# Patient Record
Sex: MALE | Race: WHITE | Employment: OTHER | URBAN - NONMETROPOLITAN AREA
[De-identification: names, ages, dates, MRNs, and addresses within clinical notes are randomized per-mention and may not be internally consistent; named-entity substitution may affect disease eponyms.]

---

## 2023-05-16 ENCOUNTER — HOSPITAL ENCOUNTER (INPATIENT)
Age: 83
LOS: 3 days | Discharge: HOME | DRG: 286 | End: 2023-05-19
Attending: INTERNAL MEDICINE
Payer: MEDICARE

## 2023-05-16 ENCOUNTER — APPOINTMENT (OUTPATIENT)
Dept: GENERAL RADIOLOGY | Age: 83
DRG: 286 | End: 2023-05-16
Payer: MEDICARE

## 2023-05-16 DIAGNOSIS — I48.91 ATRIAL FIBRILLATION WITH RVR (HCC): ICD-10-CM

## 2023-05-16 DIAGNOSIS — R60.0 BILATERAL LEG EDEMA: ICD-10-CM

## 2023-05-16 DIAGNOSIS — I50.9 ACUTE CONGESTIVE HEART FAILURE, UNSPECIFIED HEART FAILURE TYPE (HCC): ICD-10-CM

## 2023-05-16 DIAGNOSIS — I48.91 NEW ONSET ATRIAL FIBRILLATION (HCC): Primary | ICD-10-CM

## 2023-05-16 PROBLEM — I50.21 ACUTE CLINICAL SYSTOLIC HEART FAILURE (HCC): Status: ACTIVE | Noted: 2023-05-16

## 2023-05-16 LAB
25(OH)D3 SERPL-MCNC: 26 NG/ML (ref 30–100)
ABO: NORMAL
ALBUMIN SERPL BCG-MCNC: 3.6 G/DL (ref 3.5–5.1)
ALP SERPL-CCNC: 169 U/L (ref 38–126)
ALT SERPL W/O P-5'-P-CCNC: 19 U/L (ref 11–66)
ANION GAP SERPL CALC-SCNC: 10 MEQ/L (ref 8–16)
ANTIBODY SCREEN: NORMAL
APTT PPP: 35 SECONDS (ref 22–38)
AST SERPL-CCNC: 20 U/L (ref 5–40)
BASOPHILS ABSOLUTE: 0 THOU/MM3 (ref 0–0.1)
BASOPHILS NFR BLD AUTO: 0.9 %
BILIRUB SERPL-MCNC: 0.7 MG/DL (ref 0.3–1.2)
BUN SERPL-MCNC: 24 MG/DL (ref 7–22)
CALCIUM SERPL-MCNC: 9.1 MG/DL (ref 8.5–10.5)
CHLORIDE SERPL-SCNC: 104 MEQ/L (ref 98–111)
CO2 SERPL-SCNC: 24 MEQ/L (ref 23–33)
CREAT SERPL-MCNC: 1.1 MG/DL (ref 0.4–1.2)
DEPRECATED RDW RBC AUTO: 54.5 FL (ref 35–45)
DEPRECATED RDW RBC AUTO: 56 FL (ref 35–45)
EOSINOPHIL NFR BLD AUTO: 1.5 %
EOSINOPHILS ABSOLUTE: 0.1 THOU/MM3 (ref 0–0.4)
ERYTHROCYTE [DISTWIDTH] IN BLOOD BY AUTOMATED COUNT: 15.1 % (ref 11.5–14.5)
ERYTHROCYTE [DISTWIDTH] IN BLOOD BY AUTOMATED COUNT: 15.1 % (ref 11.5–14.5)
GFR SERPL CREATININE-BSD FRML MDRD: > 60 ML/MIN/1.73M2
GLUCOSE SERPL-MCNC: 106 MG/DL (ref 70–108)
HCT VFR BLD AUTO: 36.7 % (ref 42–52)
HCT VFR BLD AUTO: 38.2 % (ref 42–52)
HEPARIN UNFRACTIONATED: < 0.04 U/ML (ref 0.3–0.7)
HGB BLD-MCNC: 11.1 GM/DL (ref 14–18)
HGB BLD-MCNC: 11.6 GM/DL (ref 14–18)
HGB RETIC QN AUTO: 32.1 PG (ref 28.2–35.7)
IMM GRANULOCYTES # BLD AUTO: 0.01 THOU/MM3 (ref 0–0.07)
IMM GRANULOCYTES NFR BLD AUTO: 0.2 %
IMM RETICS NFR: 24.1 % (ref 2.3–13.4)
INR PPP: 1.14 (ref 0.85–1.13)
LYMPHOCYTES ABSOLUTE: 0.9 THOU/MM3 (ref 1–4.8)
LYMPHOCYTES NFR BLD AUTO: 18.6 %
MCH RBC QN AUTO: 29.7 PG (ref 26–33)
MCH RBC QN AUTO: 30.4 PG (ref 26–33)
MCHC RBC AUTO-ENTMCNC: 30.2 GM/DL (ref 32.2–35.5)
MCHC RBC AUTO-ENTMCNC: 30.4 GM/DL (ref 32.2–35.5)
MCV RBC AUTO: 100.3 FL (ref 80–94)
MCV RBC AUTO: 98.1 FL (ref 80–94)
MONOCYTES ABSOLUTE: 0.5 THOU/MM3 (ref 0.4–1.3)
MONOCYTES NFR BLD AUTO: 10.7 %
NEUTROPHILS NFR BLD AUTO: 68.1 %
NRBC BLD AUTO-RTO: 0 /100 WBC
NT-PROBNP SERPL IA-MCNC: 2843 PG/ML (ref 0–449)
OSMOLALITY SERPL CALC.SUM OF ELEC: 280.1 MOSMOL/KG (ref 275–300)
PLATELET # BLD AUTO: 197 THOU/MM3 (ref 130–400)
PLATELET # BLD AUTO: 205 THOU/MM3 (ref 130–400)
PMV BLD AUTO: 10 FL (ref 9.4–12.4)
PMV BLD AUTO: 9.9 FL (ref 9.4–12.4)
POTASSIUM SERPL-SCNC: 3.9 MEQ/L (ref 3.5–5.2)
PROT SERPL-MCNC: 6.6 G/DL (ref 6.1–8)
RBC # BLD AUTO: 3.74 MILL/MM3 (ref 4.7–6.1)
RBC # BLD AUTO: 3.81 MILL/MM3 (ref 4.7–6.1)
RETICS # AUTO: 63 THOU/MM3 (ref 20–115)
RETICS/RBC NFR AUTO: 1.7 % (ref 0.5–2)
RH FACTOR: NORMAL
SEGMENTED NEUTROPHILS ABSOLUTE COUNT: 3.2 THOU/MM3 (ref 1.8–7.7)
SODIUM SERPL-SCNC: 138 MEQ/L (ref 135–145)
TROPONIN T: < 0.01 NG/ML
TSH SERPL DL<=0.005 MIU/L-ACNC: 4.51 UIU/ML (ref 0.4–4.2)
WBC # BLD AUTO: 4.7 THOU/MM3 (ref 4.8–10.8)
WBC # BLD AUTO: 4.8 THOU/MM3 (ref 4.8–10.8)

## 2023-05-16 PROCEDURE — 86850 RBC ANTIBODY SCREEN: CPT

## 2023-05-16 PROCEDURE — 85027 COMPLETE CBC AUTOMATED: CPT

## 2023-05-16 PROCEDURE — 82728 ASSAY OF FERRITIN: CPT

## 2023-05-16 PROCEDURE — 85520 HEPARIN ASSAY: CPT

## 2023-05-16 PROCEDURE — 84443 ASSAY THYROID STIM HORMONE: CPT

## 2023-05-16 PROCEDURE — 99223 1ST HOSP IP/OBS HIGH 75: CPT | Performed by: INTERNAL MEDICINE

## 2023-05-16 PROCEDURE — 2580000003 HC RX 258: Performed by: PHYSICIAN ASSISTANT

## 2023-05-16 PROCEDURE — 93005 ELECTROCARDIOGRAM TRACING: CPT | Performed by: PHYSICIAN ASSISTANT

## 2023-05-16 PROCEDURE — 85046 RETICYTE/HGB CONCENTRATE: CPT

## 2023-05-16 PROCEDURE — 83540 ASSAY OF IRON: CPT

## 2023-05-16 PROCEDURE — 6370000000 HC RX 637 (ALT 250 FOR IP): Performed by: STUDENT IN AN ORGANIZED HEALTH CARE EDUCATION/TRAINING PROGRAM

## 2023-05-16 PROCEDURE — 36415 COLL VENOUS BLD VENIPUNCTURE: CPT

## 2023-05-16 PROCEDURE — 84484 ASSAY OF TROPONIN QUANT: CPT

## 2023-05-16 PROCEDURE — 85025 COMPLETE CBC W/AUTO DIFF WBC: CPT

## 2023-05-16 PROCEDURE — 6360000002 HC RX W HCPCS: Performed by: STUDENT IN AN ORGANIZED HEALTH CARE EDUCATION/TRAINING PROGRAM

## 2023-05-16 PROCEDURE — 2500000003 HC RX 250 WO HCPCS: Performed by: PHYSICIAN ASSISTANT

## 2023-05-16 PROCEDURE — 82607 VITAMIN B-12: CPT

## 2023-05-16 PROCEDURE — 86901 BLOOD TYPING SEROLOGIC RH(D): CPT

## 2023-05-16 PROCEDURE — 85610 PROTHROMBIN TIME: CPT

## 2023-05-16 PROCEDURE — 2140000000 HC CCU INTERMEDIATE R&B

## 2023-05-16 PROCEDURE — 86900 BLOOD TYPING SEROLOGIC ABO: CPT

## 2023-05-16 PROCEDURE — 99285 EMERGENCY DEPT VISIT HI MDM: CPT

## 2023-05-16 PROCEDURE — 82306 VITAMIN D 25 HYDROXY: CPT

## 2023-05-16 PROCEDURE — 83880 ASSAY OF NATRIURETIC PEPTIDE: CPT

## 2023-05-16 PROCEDURE — 85730 THROMBOPLASTIN TIME PARTIAL: CPT

## 2023-05-16 PROCEDURE — 83550 IRON BINDING TEST: CPT

## 2023-05-16 PROCEDURE — 71045 X-RAY EXAM CHEST 1 VIEW: CPT

## 2023-05-16 PROCEDURE — 82746 ASSAY OF FOLIC ACID SERUM: CPT

## 2023-05-16 PROCEDURE — 80053 COMPREHEN METABOLIC PANEL: CPT

## 2023-05-16 RX ORDER — ONDANSETRON 2 MG/ML
4 INJECTION INTRAMUSCULAR; INTRAVENOUS EVERY 6 HOURS PRN
Status: DISCONTINUED | OUTPATIENT
Start: 2023-05-16 | End: 2023-05-19 | Stop reason: HOSPADM

## 2023-05-16 RX ORDER — SODIUM CHLORIDE 9 MG/ML
INJECTION, SOLUTION INTRAVENOUS PRN
Status: DISCONTINUED | OUTPATIENT
Start: 2023-05-16 | End: 2023-05-19 | Stop reason: SDUPTHER

## 2023-05-16 RX ORDER — POLYETHYLENE GLYCOL 3350 17 G/17G
17 POWDER, FOR SOLUTION ORAL DAILY PRN
Status: DISCONTINUED | OUTPATIENT
Start: 2023-05-16 | End: 2023-05-19 | Stop reason: HOSPADM

## 2023-05-16 RX ORDER — POTASSIUM CHLORIDE 20 MEQ/1
40 TABLET, EXTENDED RELEASE ORAL PRN
Status: DISCONTINUED | OUTPATIENT
Start: 2023-05-16 | End: 2023-05-17

## 2023-05-16 RX ORDER — ACETAMINOPHEN 650 MG/1
650 SUPPOSITORY RECTAL EVERY 6 HOURS PRN
Status: DISCONTINUED | OUTPATIENT
Start: 2023-05-16 | End: 2023-05-19 | Stop reason: HOSPADM

## 2023-05-16 RX ORDER — SODIUM CHLORIDE 0.9 % (FLUSH) 0.9 %
5-40 SYRINGE (ML) INJECTION EVERY 12 HOURS SCHEDULED
Status: DISCONTINUED | OUTPATIENT
Start: 2023-05-16 | End: 2023-05-19 | Stop reason: HOSPADM

## 2023-05-16 RX ORDER — DIGOXIN 0.25 MG/ML
500 INJECTION INTRAMUSCULAR; INTRAVENOUS ONCE
Status: COMPLETED | OUTPATIENT
Start: 2023-05-16 | End: 2023-05-16

## 2023-05-16 RX ORDER — ENOXAPARIN SODIUM 100 MG/ML
40 INJECTION SUBCUTANEOUS DAILY
Status: DISCONTINUED | OUTPATIENT
Start: 2023-05-16 | End: 2023-05-16

## 2023-05-16 RX ORDER — FUROSEMIDE 10 MG/ML
20 INJECTION INTRAMUSCULAR; INTRAVENOUS 2 TIMES DAILY
Status: DISCONTINUED | OUTPATIENT
Start: 2023-05-16 | End: 2023-05-18

## 2023-05-16 RX ORDER — HEPARIN SODIUM 1000 [USP'U]/ML
2000 INJECTION, SOLUTION INTRAVENOUS; SUBCUTANEOUS PRN
Status: DISCONTINUED | OUTPATIENT
Start: 2023-05-16 | End: 2023-05-19 | Stop reason: HOSPADM

## 2023-05-16 RX ORDER — ROSUVASTATIN CALCIUM 20 MG/1
40 TABLET, COATED ORAL NIGHTLY
Status: DISCONTINUED | OUTPATIENT
Start: 2023-05-16 | End: 2023-05-19 | Stop reason: HOSPADM

## 2023-05-16 RX ORDER — DILTIAZEM HYDROCHLORIDE 5 MG/ML
10 INJECTION INTRAVENOUS ONCE
Status: COMPLETED | OUTPATIENT
Start: 2023-05-16 | End: 2023-05-16

## 2023-05-16 RX ORDER — HEPARIN SODIUM 1000 [USP'U]/ML
4000 INJECTION, SOLUTION INTRAVENOUS; SUBCUTANEOUS ONCE
Status: COMPLETED | OUTPATIENT
Start: 2023-05-16 | End: 2023-05-16

## 2023-05-16 RX ORDER — HEPARIN SODIUM 1000 [USP'U]/ML
4000 INJECTION, SOLUTION INTRAVENOUS; SUBCUTANEOUS PRN
Status: DISCONTINUED | OUTPATIENT
Start: 2023-05-16 | End: 2023-05-19 | Stop reason: HOSPADM

## 2023-05-16 RX ORDER — ATORVASTATIN CALCIUM 40 MG/1
40 TABLET, FILM COATED ORAL DAILY
COMMUNITY

## 2023-05-16 RX ORDER — MAGNESIUM HYDROXIDE/ALUMINUM HYDROXICE/SIMETHICONE 120; 1200; 1200 MG/30ML; MG/30ML; MG/30ML
30 SUSPENSION ORAL EVERY 6 HOURS PRN
Status: DISCONTINUED | OUTPATIENT
Start: 2023-05-16 | End: 2023-05-19 | Stop reason: HOSPADM

## 2023-05-16 RX ORDER — ACETAMINOPHEN 325 MG/1
650 TABLET ORAL EVERY 8 HOURS PRN
COMMUNITY

## 2023-05-16 RX ORDER — POTASSIUM CHLORIDE 7.45 MG/ML
10 INJECTION INTRAVENOUS PRN
Status: DISCONTINUED | OUTPATIENT
Start: 2023-05-16 | End: 2023-05-17

## 2023-05-16 RX ORDER — ONDANSETRON 4 MG/1
4 TABLET, ORALLY DISINTEGRATING ORAL EVERY 8 HOURS PRN
Status: DISCONTINUED | OUTPATIENT
Start: 2023-05-16 | End: 2023-05-19 | Stop reason: HOSPADM

## 2023-05-16 RX ORDER — DIGOXIN 0.25 MG/ML
250 INJECTION INTRAMUSCULAR; INTRAVENOUS EVERY 6 HOURS
Status: COMPLETED | OUTPATIENT
Start: 2023-05-17 | End: 2023-05-17

## 2023-05-16 RX ORDER — ACETAMINOPHEN 325 MG/1
650 TABLET ORAL EVERY 6 HOURS PRN
Status: DISCONTINUED | OUTPATIENT
Start: 2023-05-16 | End: 2023-05-19 | Stop reason: HOSPADM

## 2023-05-16 RX ORDER — HEPARIN SODIUM 10000 [USP'U]/100ML
5-30 INJECTION, SOLUTION INTRAVENOUS CONTINUOUS
Status: DISCONTINUED | OUTPATIENT
Start: 2023-05-16 | End: 2023-05-19 | Stop reason: HOSPADM

## 2023-05-16 RX ORDER — SPIRONOLACTONE 25 MG/1
25 TABLET ORAL DAILY
Status: DISCONTINUED | OUTPATIENT
Start: 2023-05-16 | End: 2023-05-19 | Stop reason: HOSPADM

## 2023-05-16 RX ORDER — MAGNESIUM SULFATE IN WATER 40 MG/ML
2000 INJECTION, SOLUTION INTRAVENOUS PRN
Status: DISCONTINUED | OUTPATIENT
Start: 2023-05-16 | End: 2023-05-19 | Stop reason: SDUPTHER

## 2023-05-16 RX ORDER — TAMSULOSIN HYDROCHLORIDE 0.4 MG/1
0.4 CAPSULE ORAL DAILY
COMMUNITY

## 2023-05-16 RX ORDER — SENNOSIDES 8.8 MG/5ML
5 LIQUID ORAL 2 TIMES DAILY PRN
Status: DISCONTINUED | OUTPATIENT
Start: 2023-05-16 | End: 2023-05-19 | Stop reason: HOSPADM

## 2023-05-16 RX ORDER — SODIUM CHLORIDE 0.9 % (FLUSH) 0.9 %
5-40 SYRINGE (ML) INJECTION PRN
Status: DISCONTINUED | OUTPATIENT
Start: 2023-05-16 | End: 2023-05-19 | Stop reason: HOSPADM

## 2023-05-16 RX ADMIN — HEPARIN SODIUM 4000 UNITS: 1000 INJECTION INTRAVENOUS; SUBCUTANEOUS at 20:17

## 2023-05-16 RX ADMIN — HEPARIN SODIUM 12 UNITS/KG/HR: 10000 INJECTION, SOLUTION INTRAVENOUS at 20:21

## 2023-05-16 RX ADMIN — ROSUVASTATIN CALCIUM 40 MG: 20 TABLET, FILM COATED ORAL at 23:35

## 2023-05-16 RX ADMIN — FUROSEMIDE 20 MG: 10 INJECTION, SOLUTION INTRAMUSCULAR; INTRAVENOUS at 23:29

## 2023-05-16 RX ADMIN — SPIRONOLACTONE 25 MG: 25 TABLET ORAL at 23:35

## 2023-05-16 RX ADMIN — DILTIAZEM HYDROCHLORIDE 10 MG: 5 INJECTION INTRAVENOUS at 18:07

## 2023-05-16 RX ADMIN — DIGOXIN 500 MCG: 0.25 INJECTION INTRAMUSCULAR; INTRAVENOUS at 21:46

## 2023-05-16 RX ADMIN — DILTIAZEM HYDROCHLORIDE 5 MG/HR: 5 INJECTION INTRAVENOUS at 18:08

## 2023-05-16 ASSESSMENT — ENCOUNTER SYMPTOMS
PHOTOPHOBIA: 0
CHEST TIGHTNESS: 0
SHORTNESS OF BREATH: 1
WHEEZING: 0
ABDOMINAL DISTENTION: 0
VOMITING: 0
ABDOMINAL PAIN: 0
COUGH: 1

## 2023-05-16 NOTE — ED TRIAGE NOTES
Pt to ED due to bilateral leg swelling. Pt states this has been gradually getting worse over the past month. Pt states that the swelling is better when lays down at night.

## 2023-05-16 NOTE — ED NOTES
ED to inpatient nurses report    Chief Complaint   Patient presents with    Leg Swelling      Present to ED from home  LOC: alert and orientated to name, place, date  Vital signs   Vitals:    05/16/23 1613 05/16/23 1708 05/16/23 1838 05/16/23 1858   BP:  (!) 116/91 (!) 125/96 (!) 125/96   Pulse: (!) 158 (!) 152 91 (!) 112   Resp:  17 14 18   Temp:       SpO2:  98% 95% 98%   Weight:          Oxygen Baseline 98%    Current needs required RA Bipap/Cpap No  LDAs:   Peripheral IV 05/16/23 Left Antecubital (Active)     Mobility: Independent  Pending ED orders: NA  Present condition: stable      C-SSRS Risk of Suicide: No Risk  Swallow Screening    Preferred Language: [place]     Electronically signed by Suraj Brown RN on 5/16/2023 at 7:10 PM       Suraj Brown RN  05/16/23 1911

## 2023-05-16 NOTE — ED NOTES
Pt resting on cot. No distress noted. RR even and unlabored. Call light in reach. Pt denies any further needs at this time.       Bryce Barker RN  05/16/23 8102

## 2023-05-16 NOTE — ED PROVIDER NOTES
onset atrial fibrillation ([address]    2. Atrial fibrillation with RVR ([address]    3. Acute congestive heart failure, unspecified heart failure type ([address]    4.  Bilateral leg edema      Admission      (Please note that portions of this note were completed with a voice recognition program.  Efforts were made to edit the dictations but occasionally words are mis-transcribed.)    Raheel Nunez PA-C 05/16/23 6:50 PM    SEVTLANA Landin PA-C  05/20/23 0202

## 2023-05-16 NOTE — ED NOTES
ED nurse-to-nurse bedside report    Chief Complaint   Patient presents with    Leg Swelling      LOC: alert and orientated to name, place, date  Vital signs   Vitals:    05/16/23 1613 05/16/23 1708 05/16/23 1838 05/16/23 1858   BP:  (!) 116/91 (!) 125/96 (!) 125/96   Pulse: (!) 158 (!) 152 91 (!) 112   Resp:  17 14 18   Temp:       SpO2:  98% 95% 98%   Weight:          Pain:    Pain Interventions: NA  Pain Goal: 0  Oxygen: No    Current needs required R   Telemetry: Yes  LDAs:   Peripheral IV 05/16/23 Left Antecubital (Active)     Continuous Infusions:    dilTIAZem 5 mg/hr (05/16/23 1808)    sodium chloride      heparin (PORCINE) Infusion       Mobility: Independent  Moss Fall Risk Score: No flowsheet data found.   Fall Interventions: call light in reach, bed in low position with wheels locked  Report given to: Drew Antonio RN  05/16/23 3877

## 2023-05-16 NOTE — ED NOTES
Pt resting on cot. No distress noted. RR even and unlabored. Call light in reach. Pt denies any needs at this time.       Nathan Newell RN  05/16/23 6376

## 2023-05-17 PROBLEM — I48.91 NEW ONSET ATRIAL FIBRILLATION (HCC): Status: ACTIVE | Noted: 2023-05-17

## 2023-05-17 LAB
ANION GAP SERPL CALC-SCNC: 14 MEQ/L (ref 8–16)
APTT PPP: 82.3 SECONDS (ref 22–38)
BASOPHILS ABSOLUTE: 0 THOU/MM3 (ref 0–0.1)
BASOPHILS NFR BLD AUTO: 0.8 %
BUN SERPL-MCNC: 19 MG/DL (ref 7–22)
CALCIUM SERPL-MCNC: 9.1 MG/DL (ref 8.5–10.5)
CHLORIDE SERPL-SCNC: 100 MEQ/L (ref 98–111)
CHOLEST SERPL-MCNC: 109 MG/DL (ref 100–199)
CO2 SERPL-SCNC: 24 MEQ/L (ref 23–33)
CREAT SERPL-MCNC: 1.1 MG/DL (ref 0.4–1.2)
DEPRECATED RDW RBC AUTO: 54.2 FL (ref 35–45)
EOSINOPHIL NFR BLD AUTO: 2.3 %
EOSINOPHILS ABSOLUTE: 0.1 THOU/MM3 (ref 0–0.4)
ERYTHROCYTE [DISTWIDTH] IN BLOOD BY AUTOMATED COUNT: 15 % (ref 11.5–14.5)
FERRITIN SERPL IA-MCNC: 32 NG/ML (ref 22–322)
FOLATE SERPL-MCNC: 15.7 NG/ML (ref 4.8–24.2)
GFR SERPL CREATININE-BSD FRML MDRD: > 60 ML/MIN/1.73M2
GLUCOSE SERPL-MCNC: 106 MG/DL (ref 70–108)
HCT VFR BLD AUTO: 38.2 % (ref 42–52)
HDLC SERPL-MCNC: 54 MG/DL
HEPARIN UNFRACTIONATED: 0.34 U/ML (ref 0.3–0.7)
HEPARIN UNFRACTIONATED: 0.39 U/ML (ref 0.3–0.7)
HEPARIN UNFRACTIONATED: 0.44 U/ML (ref 0.3–0.7)
HGB BLD-MCNC: 11.5 GM/DL (ref 14–18)
IMM GRANULOCYTES # BLD AUTO: 0.01 THOU/MM3 (ref 0–0.07)
IMM GRANULOCYTES NFR BLD AUTO: 0.2 %
INR PPP: 1.18 (ref 0.85–1.13)
IRON SATN MFR SERPL: 13 % (ref 20–50)
IRON SERPL-MCNC: 42 UG/DL (ref 65–195)
LDLC SERPL CALC-MCNC: 46 MG/DL
LV EF: 50 %
LVEF MODALITY: NORMAL
LYMPHOCYTES ABSOLUTE: 1.3 THOU/MM3 (ref 1–4.8)
LYMPHOCYTES NFR BLD AUTO: 24.3 %
MAGNESIUM SERPL-MCNC: 1.7 MG/DL (ref 1.6–2.4)
MAGNESIUM SERPL-MCNC: 1.7 MG/DL (ref 1.6–2.4)
MCH RBC QN AUTO: 29.6 PG (ref 26–33)
MCHC RBC AUTO-ENTMCNC: 30.1 GM/DL (ref 32.2–35.5)
MCV RBC AUTO: 98.2 FL (ref 80–94)
MONOCYTES ABSOLUTE: 0.7 THOU/MM3 (ref 0.4–1.3)
MONOCYTES NFR BLD AUTO: 13.7 %
NEUTROPHILS NFR BLD AUTO: 58.7 %
NRBC BLD AUTO-RTO: 0 /100 WBC
OSMOLALITY SERPL CALC.SUM OF ELEC: 278.4 MOSMOL/KG (ref 275–300)
PLATELET # BLD AUTO: 225 THOU/MM3 (ref 130–400)
PMV BLD AUTO: 9.6 FL (ref 9.4–12.4)
POTASSIUM SERPL-SCNC: 3.8 MEQ/L (ref 3.5–5.2)
RBC # BLD AUTO: 3.89 MILL/MM3 (ref 4.7–6.1)
SEGMENTED NEUTROPHILS ABSOLUTE COUNT: 3.1 THOU/MM3 (ref 1.8–7.7)
SODIUM SERPL-SCNC: 138 MEQ/L (ref 135–145)
T4 FREE SERPL-MCNC: 1 NG/DL (ref 0.93–1.76)
TIBC SERPL-MCNC: 322 UG/DL (ref 171–450)
TRIGL SERPL-MCNC: 47 MG/DL (ref 0–199)
VIT B12 SERPL-MCNC: 490 PG/ML (ref 211–911)
WBC # BLD AUTO: 5.2 THOU/MM3 (ref 4.8–10.8)

## 2023-05-17 PROCEDURE — 2580000003 HC RX 258: Performed by: PHYSICIAN ASSISTANT

## 2023-05-17 PROCEDURE — 99223 1ST HOSP IP/OBS HIGH 75: CPT | Performed by: INTERNAL MEDICINE

## 2023-05-17 PROCEDURE — 2580000003 HC RX 258: Performed by: STUDENT IN AN ORGANIZED HEALTH CARE EDUCATION/TRAINING PROGRAM

## 2023-05-17 PROCEDURE — 6370000000 HC RX 637 (ALT 250 FOR IP): Performed by: STUDENT IN AN ORGANIZED HEALTH CARE EDUCATION/TRAINING PROGRAM

## 2023-05-17 PROCEDURE — 80048 BASIC METABOLIC PNL TOTAL CA: CPT

## 2023-05-17 PROCEDURE — 36415 COLL VENOUS BLD VENIPUNCTURE: CPT

## 2023-05-17 PROCEDURE — 93306 TTE W/DOPPLER COMPLETE: CPT

## 2023-05-17 PROCEDURE — 2500000003 HC RX 250 WO HCPCS: Performed by: PHYSICIAN ASSISTANT

## 2023-05-17 PROCEDURE — 85610 PROTHROMBIN TIME: CPT

## 2023-05-17 PROCEDURE — 51798 US URINE CAPACITY MEASURE: CPT

## 2023-05-17 PROCEDURE — 80061 LIPID PANEL: CPT

## 2023-05-17 PROCEDURE — 2140000000 HC CCU INTERMEDIATE R&B

## 2023-05-17 PROCEDURE — 6360000002 HC RX W HCPCS: Performed by: STUDENT IN AN ORGANIZED HEALTH CARE EDUCATION/TRAINING PROGRAM

## 2023-05-17 PROCEDURE — 85025 COMPLETE CBC W/AUTO DIFF WBC: CPT

## 2023-05-17 PROCEDURE — 84439 ASSAY OF FREE THYROXINE: CPT

## 2023-05-17 PROCEDURE — 93010 ELECTROCARDIOGRAM REPORT: CPT | Performed by: INTERNAL MEDICINE

## 2023-05-17 PROCEDURE — 83735 ASSAY OF MAGNESIUM: CPT

## 2023-05-17 PROCEDURE — 85520 HEPARIN ASSAY: CPT

## 2023-05-17 PROCEDURE — 85730 THROMBOPLASTIN TIME PARTIAL: CPT

## 2023-05-17 RX ORDER — MAGNESIUM SULFATE IN WATER 40 MG/ML
2000 INJECTION, SOLUTION INTRAVENOUS PRN
Status: DISCONTINUED | OUTPATIENT
Start: 2023-05-17 | End: 2023-05-19 | Stop reason: HOSPADM

## 2023-05-17 RX ORDER — POTASSIUM CHLORIDE 7.45 MG/ML
10 INJECTION INTRAVENOUS PRN
Status: DISCONTINUED | OUTPATIENT
Start: 2023-05-17 | End: 2023-05-19 | Stop reason: HOSPADM

## 2023-05-17 RX ORDER — POTASSIUM CHLORIDE 20 MEQ/1
40 TABLET, EXTENDED RELEASE ORAL PRN
Status: DISCONTINUED | OUTPATIENT
Start: 2023-05-17 | End: 2023-05-19 | Stop reason: HOSPADM

## 2023-05-17 RX ORDER — METOPROLOL SUCCINATE 25 MG/1
25 TABLET, EXTENDED RELEASE ORAL DAILY
Status: DISCONTINUED | OUTPATIENT
Start: 2023-05-17 | End: 2023-05-18

## 2023-05-17 RX ORDER — POTASSIUM CHLORIDE 20 MEQ/1
20 TABLET, EXTENDED RELEASE ORAL PRN
Status: DISCONTINUED | OUTPATIENT
Start: 2023-05-17 | End: 2023-05-19 | Stop reason: HOSPADM

## 2023-05-17 RX ADMIN — FUROSEMIDE 20 MG: 10 INJECTION, SOLUTION INTRAMUSCULAR; INTRAVENOUS at 18:26

## 2023-05-17 RX ADMIN — SODIUM CHLORIDE, PRESERVATIVE FREE 10 ML: 5 INJECTION INTRAVENOUS at 08:27

## 2023-05-17 RX ADMIN — SACUBITRIL AND VALSARTAN 1 TABLET: 24; 26 TABLET, FILM COATED ORAL at 20:19

## 2023-05-17 RX ADMIN — DIGOXIN 250 MCG: 0.25 INJECTION INTRAMUSCULAR; INTRAVENOUS at 03:01

## 2023-05-17 RX ADMIN — POTASSIUM CHLORIDE 20 MEQ: 1500 TABLET, EXTENDED RELEASE ORAL at 14:39

## 2023-05-17 RX ADMIN — DILTIAZEM HYDROCHLORIDE 5 MG/HR: 5 INJECTION INTRAVENOUS at 20:26

## 2023-05-17 RX ADMIN — FUROSEMIDE 20 MG: 10 INJECTION, SOLUTION INTRAMUSCULAR; INTRAVENOUS at 08:26

## 2023-05-17 RX ADMIN — ROSUVASTATIN CALCIUM 40 MG: 20 TABLET, FILM COATED ORAL at 20:19

## 2023-05-17 RX ADMIN — SPIRONOLACTONE 25 MG: 25 TABLET ORAL at 08:27

## 2023-05-17 RX ADMIN — METOPROLOL SUCCINATE 25 MG: 25 TABLET, EXTENDED RELEASE ORAL at 16:43

## 2023-05-17 RX ADMIN — HEPARIN SODIUM 12 UNITS/KG/HR: 10000 INJECTION, SOLUTION INTRAVENOUS at 22:51

## 2023-05-17 RX ADMIN — DIGOXIN 250 MCG: 0.25 INJECTION INTRAMUSCULAR; INTRAVENOUS at 08:26

## 2023-05-17 NOTE — PLAN OF CARE
Problem: Discharge Planning  Goal: Discharge to home or other facility with appropriate resources  Outcome: Progressing  Flowsheets (Taken 5/17/2023 0419)  Discharge to home or other facility with appropriate resources:   Identify barriers to discharge with patient and caregiver   Identify discharge learning needs (meds, wound care, etc)     Problem: Skin/Tissue Integrity  Goal: Absence of new skin breakdown  Description: 1. Monitor for areas of redness and/or skin breakdown  2. Assess vascular access sites hourly  3. Every 4-6 hours minimum:  Change oxygen saturation probe site  4. Every 4-6 hours:  If on nasal continuous positive airway pressure, respiratory therapy assess nares and determine need for appliance change or resting period. Outcome: Progressing  Note: Ongoing assessment & interventions provided throughout shift. Skin assessments provided. Encouraging/assisting patient to turn as needed.       Problem: ABCDS Injury Assessment  Goal: Absence of physical injury  Outcome: Progressing  Flowsheets (Taken 5/17/2023 0419)  Absence of Physical Injury: Implement safety measures based on patient assessment     Problem: Cardiovascular - Adult  Goal: Maintains optimal cardiac output and hemodynamic stability  Outcome: Progressing  Flowsheets (Taken 5/17/2023 0419)  Maintains optimal cardiac output and hemodynamic stability:   Monitor blood pressure and heart rate   Monitor urine output and notify Licensed Independent Practitioner for values outside of normal range   Assess for signs of decreased cardiac output  Goal: Absence of cardiac dysrhythmias or at baseline  Outcome: Progressing  Flowsheets (Taken 5/17/2023 0419)  Absence of cardiac dysrhythmias or at baseline:   Monitor cardiac rate and rhythm   Assess for signs of decreased cardiac output     Problem: Chronic Conditions and Co-morbidities  Goal: Patient's chronic conditions and co-morbidity symptoms are monitored and maintained or

## 2023-05-17 NOTE — ED NOTES
Pt resting on cot comfortably at this time. Pt denies no other needs.      Cathy Henriquez RN  05/16/23 2032

## 2023-05-17 NOTE — FLOWSHEET NOTE
Pulido catheter insertion initially ordered in the ED. Bladder scan urine output this AM  is 469 ml as per Mercedes Meza of EKG. Attempted to ask patient if he wants a pulido catheter insertion but he refused for the meantime. Tried to encourage patient to void and he voided 500 ml of urine output.

## 2023-05-17 NOTE — PLAN OF CARE
Problem: Discharge Planning  Goal: Discharge to home or other facility with appropriate resources  5/17/2023 1200 by Shiloh Warner RN  Outcome: Progressing  Flowsheets (Taken 5/17/2023 0815)  Discharge to home or other facility with appropriate resources:   Identify barriers to discharge with patient and caregiver   Arrange for needed discharge resources and transportation as appropriate   Identify discharge learning needs (meds, wound care, etc)     Problem: Skin/Tissue Integrity  Goal: Absence of new skin breakdown  Description: 1. Monitor for areas of redness and/or skin breakdown  2. Assess vascular access sites hourly  3. Every 4-6 hours minimum:  Change oxygen saturation probe site  4. Every 4-6 hours:  If on nasal continuous positive airway pressure, respiratory therapy assess nares and determine need for appliance change or resting period. 5/17/2023 1200 by Shiloh Warner RN  Outcome: Progressing  Note: Ongoing assessment & interventions provided throughout shift. Skin assessments provided.   Encouraging/assisting patient to turn as needed.]     Problem: ABCDS Injury Assessment  Goal: Absence of physical injury  5/17/2023 1200 by Shiloh Warner RN  Outcome: Progressing  Flowsheets (Taken 5/17/2023 0419 by Dori Alexander, RN)  Absence of Physical Injury: Implement safety measures based on patient assessment     Problem: Cardiovascular - Adult  Goal: Maintains optimal cardiac output and hemodynamic stability  5/17/2023 1200 by Shiloh Warner RN  Outcome: Progressing  [address] (Taken 5/17/2023 0419 by Dori Alexander, RN)  Maintains optimal cardiac output and hemodynamic stability:   Monitor blood pressure and heart rate   Monitor urine output and notify Licensed Independent Practitioner for values outside of normal range   Assess for signs of decreased cardiac output     Problem: Cardiovascular - Adult  Goal: Absence of cardiac dysrhythmias or at baseline  5/17/2023 1200 by Shiloh Warner RN  Outcome:

## 2023-05-17 NOTE — CARE COORDINATION
Case Management Assessment  Initial Evaluation    Date/Time of Evaluation: 5/17/2023 1:56 PM  Assessment Completed by: Choco Bronson RN    If patient is discharged prior to next notation, then this note serves as note for discharge by case management. Patient Name: Hari Valencia                   YOB: 1940  Diagnosis: New onset atrial fibrillation ([address] [I48.91]  Bilateral leg edema [R60.0]  Atrial fibrillation with RVR ([address] [I48.91]  Acute clinical systolic heart failure ([address] [I50.21]  Acute congestive heart failure, unspecified heart failure type ([address] [I50.9]                   Date / Time: 5/16/2023  4:01 PM  Location: [address] [place] [address]     Patient Admission Status: Inpatient   Readmission Risk Low 0-14, Mod 15-19), High > 20: Readmission Risk Score: 13.4    Current PCP: Marylou Moss MD  PCP verified by CM? Yes    Chart Reviewed: Yes      History Provided by: Patient, Child/Family  Patient Orientation: Alert and Oriented    Patient Cognition: Alert    Hospitalization in the last 30 days (Readmission):  No    If yes, Readmission Assessment in CM Navigator will be completed. Advance Directives:      Code Status: Full Code   Patient's Primary Decision Maker is: Legal Next of Kin (Has at home)    Primary Decision Maker: Fredo Arnol - Child - [phone]    Primary Decision Maker: Prernaamparo Blackburn - Child - [phone]    Discharge Planning:    Patient lives with: Children (Son) Type of Home: House  Primary Care Giver: Self  Patient Support Systems include: Children, Family Members   Current Financial resources: Medicare  Current community resources: None  Current services prior to admission: None            Current DME:              Type of Home Care services:  None    ADLS  Prior functional level: Independent in ADLs/IADLs  Current functional level: Independent in ADLs/IADLs    Family can provide assistance at DC:  Yes  Would you like Case Management to discuss the discharge plan with any

## 2023-05-17 NOTE — PROCEDURES
Bladder scan completed at 0440 and results told to [facility] ([facility]). Scan showed 469 mL in the patients bladder. Last void was unknown.  Vonzell Barrier CET

## 2023-05-18 ENCOUNTER — APPOINTMENT (OUTPATIENT)
Dept: CARDIAC CATH/INVASIVE PROCEDURES | Age: 83
DRG: 286 | End: 2023-05-18
Payer: MEDICARE

## 2023-05-18 LAB
ANION GAP SERPL CALC-SCNC: 15 MEQ/L (ref 8–16)
BUN SERPL-MCNC: 15 MG/DL (ref 7–22)
CALCIUM SERPL-MCNC: 9.5 MG/DL (ref 8.5–10.5)
CHLORIDE SERPL-SCNC: 96 MEQ/L (ref 98–111)
CO2 SERPL-SCNC: 26 MEQ/L (ref 23–33)
CREAT SERPL-MCNC: 1 MG/DL (ref 0.4–1.2)
DEPRECATED RDW RBC AUTO: 50.4 FL (ref 35–45)
EKG Q-T INTERVAL: 264 MS
EKG QRS DURATION: 94 MS
EKG QTC CALCULATION (BAZETT): 396 MS
EKG R AXIS: -49 DEGREES
EKG T AXIS: 45 DEGREES
EKG VENTRICULAR RATE: 135 BPM
ERYTHROCYTE [DISTWIDTH] IN BLOOD BY AUTOMATED COUNT: 14.6 % (ref 11.5–14.5)
GFR SERPL CREATININE-BSD FRML MDRD: > 60 ML/MIN/1.73M2
GLUCOSE SERPL-MCNC: 109 MG/DL (ref 70–108)
HCT VFR BLD AUTO: 41.4 % (ref 42–52)
HEPARIN UNFRACTIONATED: 0.33 U/ML (ref 0.3–0.7)
HGB BLD-MCNC: 13.2 GM/DL (ref 14–18)
LV EF: 50 %
LVEF MODALITY: NORMAL
MAGNESIUM SERPL-MCNC: 1.6 MG/DL (ref 1.6–2.4)
MCH RBC QN AUTO: 30.2 PG (ref 26–33)
MCHC RBC AUTO-ENTMCNC: 31.9 GM/DL (ref 32.2–35.5)
MCV RBC AUTO: 94.7 FL (ref 80–94)
PLATELET # BLD AUTO: 244 THOU/MM3 (ref 130–400)
PMV BLD AUTO: 9.4 FL (ref 9.4–12.4)
POTASSIUM SERPL-SCNC: 3.8 MEQ/L (ref 3.5–5.2)
RBC # BLD AUTO: 4.37 MILL/MM3 (ref 4.7–6.1)
SODIUM SERPL-SCNC: 137 MEQ/L (ref 135–145)
WBC # BLD AUTO: 7.7 THOU/MM3 (ref 4.8–10.8)

## 2023-05-18 PROCEDURE — 2140000000 HC CCU INTERMEDIATE R&B

## 2023-05-18 PROCEDURE — 85027 COMPLETE CBC AUTOMATED: CPT

## 2023-05-18 PROCEDURE — 92960 CARDIOVERSION ELECTRIC EXT: CPT

## 2023-05-18 PROCEDURE — 36415 COLL VENOUS BLD VENIPUNCTURE: CPT

## 2023-05-18 PROCEDURE — 93312 ECHO TRANSESOPHAGEAL: CPT

## 2023-05-18 PROCEDURE — 6370000000 HC RX 637 (ALT 250 FOR IP): Performed by: STUDENT IN AN ORGANIZED HEALTH CARE EDUCATION/TRAINING PROGRAM

## 2023-05-18 PROCEDURE — 92960 CARDIOVERSION ELECTRIC EXT: CPT | Performed by: INTERNAL MEDICINE

## 2023-05-18 PROCEDURE — 6360000002 HC RX W HCPCS

## 2023-05-18 PROCEDURE — 6360000002 HC RX W HCPCS: Performed by: STUDENT IN AN ORGANIZED HEALTH CARE EDUCATION/TRAINING PROGRAM

## 2023-05-18 PROCEDURE — 93325 DOPPLER ECHO COLOR FLOW MAPG: CPT

## 2023-05-18 PROCEDURE — 2580000003 HC RX 258: Performed by: STUDENT IN AN ORGANIZED HEALTH CARE EDUCATION/TRAINING PROGRAM

## 2023-05-18 PROCEDURE — B24BZZ4 ULTRASONOGRAPHY OF HEART WITH AORTA, TRANSESOPHAGEAL: ICD-10-PCS | Performed by: INTERNAL MEDICINE

## 2023-05-18 PROCEDURE — 99232 SBSQ HOSP IP/OBS MODERATE 35: CPT | Performed by: PHYSICIAN ASSISTANT

## 2023-05-18 PROCEDURE — 80048 BASIC METABOLIC PNL TOTAL CA: CPT

## 2023-05-18 PROCEDURE — 6370000000 HC RX 637 (ALT 250 FOR IP)

## 2023-05-18 PROCEDURE — 6370000000 HC RX 637 (ALT 250 FOR IP): Performed by: PHYSICIAN ASSISTANT

## 2023-05-18 PROCEDURE — 2500000003 HC RX 250 WO HCPCS

## 2023-05-18 PROCEDURE — 83735 ASSAY OF MAGNESIUM: CPT

## 2023-05-18 PROCEDURE — 5A2204Z RESTORATION OF CARDIAC RHYTHM, SINGLE: ICD-10-PCS | Performed by: INTERNAL MEDICINE

## 2023-05-18 PROCEDURE — 93005 ELECTROCARDIOGRAM TRACING: CPT | Performed by: INTERNAL MEDICINE

## 2023-05-18 PROCEDURE — 85520 HEPARIN ASSAY: CPT

## 2023-05-18 PROCEDURE — 93320 DOPPLER ECHO COMPLETE: CPT

## 2023-05-18 RX ORDER — METOPROLOL SUCCINATE 25 MG/1
25 TABLET, EXTENDED RELEASE ORAL ONCE
Status: COMPLETED | OUTPATIENT
Start: 2023-05-18 | End: 2023-05-18

## 2023-05-18 RX ORDER — METOPROLOL SUCCINATE 50 MG/1
50 TABLET, EXTENDED RELEASE ORAL DAILY
Status: DISCONTINUED | OUTPATIENT
Start: 2023-05-19 | End: 2023-05-19 | Stop reason: HOSPADM

## 2023-05-18 RX ORDER — FUROSEMIDE 20 MG/1
20 TABLET ORAL DAILY
Status: DISCONTINUED | OUTPATIENT
Start: 2023-05-19 | End: 2023-05-19 | Stop reason: HOSPADM

## 2023-05-18 RX ADMIN — SPIRONOLACTONE 25 MG: 25 TABLET ORAL at 08:26

## 2023-05-18 RX ADMIN — METOPROLOL SUCCINATE 25 MG: 25 TABLET, EXTENDED RELEASE ORAL at 08:26

## 2023-05-18 RX ADMIN — SACUBITRIL AND VALSARTAN 1 TABLET: 24; 26 TABLET, FILM COATED ORAL at 08:26

## 2023-05-18 RX ADMIN — ROSUVASTATIN CALCIUM 40 MG: 20 TABLET, FILM COATED ORAL at 20:03

## 2023-05-18 RX ADMIN — SACUBITRIL AND VALSARTAN 1 TABLET: 24; 26 TABLET, FILM COATED ORAL at 20:03

## 2023-05-18 RX ADMIN — METOPROLOL SUCCINATE 25 MG: 25 TABLET, EXTENDED RELEASE ORAL at 18:17

## 2023-05-18 RX ADMIN — FUROSEMIDE 20 MG: 10 INJECTION, SOLUTION INTRAMUSCULAR; INTRAVENOUS at 08:26

## 2023-05-18 RX ADMIN — ACETAMINOPHEN 650 MG: 325 TABLET ORAL at 08:26

## 2023-05-18 RX ADMIN — MAGNESIUM SULFATE HEPTAHYDRATE 2000 MG: 40 INJECTION, SOLUTION INTRAVENOUS at 09:54

## 2023-05-18 RX ADMIN — SODIUM CHLORIDE, PRESERVATIVE FREE 10 ML: 5 INJECTION INTRAVENOUS at 08:26

## 2023-05-18 ASSESSMENT — PAIN DESCRIPTION - LOCATION: LOCATION: SHOULDER

## 2023-05-18 ASSESSMENT — PAIN DESCRIPTION - ONSET: ONSET: ON-GOING

## 2023-05-18 ASSESSMENT — PAIN DESCRIPTION - FREQUENCY: FREQUENCY: INTERMITTENT

## 2023-05-18 ASSESSMENT — PAIN DESCRIPTION - ORIENTATION: ORIENTATION: RIGHT

## 2023-05-18 ASSESSMENT — PAIN DESCRIPTION - PAIN TYPE: TYPE: CHRONIC PAIN

## 2023-05-18 ASSESSMENT — PAIN SCALES - GENERAL: PAINLEVEL_OUTOF10: 6

## 2023-05-18 ASSESSMENT — PAIN DESCRIPTION - DESCRIPTORS: DESCRIPTORS: ACHING;SORE

## 2023-05-18 NOTE — OP NOTE
Date of Procedure: 5/18/2023  Patient's Name: Agapito Dodge  YOB: 1940   Medical Record Number: [id number]  Referring Physician: Stefani Guzman MD  Procedure Performed by: Carmen Dover MD MD, Michael Salas, RPVI    DATE: 5/18/2023       PROCEDURE PERFORMED: MARIA A-guided cardioversion. INDICATION FOR PROCEDURE: Atrial fibrillation         INFORMED CONSENT: The patient was informed - we discussed the risks of the procedure with the patient, risks not limited to damage to teeth, death,  stroke, bleeding and dissection of the esophagus. Despite the risks, the  patient agreed to undergo a MARIA A with cardioversion. Time out : Taken    Complications: None      DESCRIPTION OF PROCEDURE: The patient was brought to the cardiac  catheterization lab, and the Cetacaine spray was utilized to numb the throat. Then after that, sedation with fentanyl and versed was provided under supervision of me. Once the patient received conscious  sedation, the probe was introduced very carefully and manipulated to mid and  distal esophagus. The findings were as follows: The left atrial appendage was normal. No  thrombus was noted. Then, the patient underwent synchronized cardioversion at 200 , 300 and 360 joules of energy. Unfortunately his Afib remained. Likely this is a chronic Afib. Reversal agents of narcan and flumazenil were given to assess neuro function which showed no significant findings. CONCLUSION:  Unsuccessful cardioversion utilizing 200, 300 and 360 joules of energy. At this point, we have to assume that his Afib is chronic. Could consider DCCV after loading him with amiodarone in 2 weeks       Again, patient tolerated the procedure well without any complications.           Carmen Dover MD MD, Michael Salas, [address]  Electronically signed 5/18/2023 at 4:04 PM        CC: Kristina Livingston MD

## 2023-05-18 NOTE — CONSULTS
Nutrition Education    Educated on Heart Failure Diet Education  Learners: Patient  Readiness: Acceptance  Method: Explanation, Demonstration, and Handout  Response: Demonstrated Understanding  Contact name and number provided.     Christin Villasenor RD  Contact Number: [phone]
oriented x 3, cranial nerves 2-12 grossly intact, motor and sensory intact, moving all extremities  Skin: No rashes  Psych: AO x 3, no depression/gretchen, no pressured speech, normal affect  Lymph: No obvious LAD      Assessment:  Acute clinical systolic heart failure (HCC)  Currently rate controlled at 80 bpm on diltiazem drip, heparin drip. Currently diuresing -2.9 L on Lasix 20 mg IV BID. Started on Aldactone 25 mg qd. Patient was only on HCTZ 25 mg qam at home. 2 New onset Afib: Pt currently on Heparin drip. ChadsVasc: 4    Plan:  Plan to start Entresto 24/26 bid and Toprol xl 25 qd. Plan to perform MARIA A, cath, cardioversion in the a.m. n.p.o. after midnight. Will refer to outpatient CHF clinic and cardiology on discharge. Will need to change to oral anticoagulation following cardioversion. Plan to start pt on Eliquis    Thank you for allowing us to participate in the care of this patient. Please do not hesitate to call us with questions. Time spent reviewing notes, data, discussing with patient/family, and formulating plan with clinical documentation was approximately 80 minutes. Electronically signed by Christiano Alicea DO on 5/17/2023 at 11:11 AM    Supervising [de-identified] Attestation Statement  I performed a history and physical examination on the patient and discussed the management with the resident physician. I reviewed and agree with the findings and plan as documented in the resident's note except for as noted below. Electronically signed by Mark Elliott MD on 5/17/23 at 6:06 PM EDT  82 yo M who presents with Acute CHF exacerbation and Afib with RVR. Currently diuresing well. Follow up Echo. Will need MARIA A guided DCCV if still in Afib in AM.  Keep NPO. Prelim Echo shows mildly reduced EF. Scheduled for LHC once diuresed well. R/B/A were discussed. Further recs based on results and clinical course.      Interventional Cardiology - [facility]

## 2023-05-19 ENCOUNTER — APPOINTMENT (OUTPATIENT)
Dept: CARDIAC CATH/INVASIVE PROCEDURES | Age: 83
DRG: 286 | End: 2023-05-19
Payer: MEDICARE

## 2023-05-19 VITALS
TEMPERATURE: 97.7 F | BODY MASS INDEX: 25.63 KG/M2 | WEIGHT: 189 LBS | SYSTOLIC BLOOD PRESSURE: 120 MMHG | DIASTOLIC BLOOD PRESSURE: 89 MMHG | OXYGEN SATURATION: 97 % | RESPIRATION RATE: 18 BRPM | HEART RATE: 85 BPM

## 2023-05-19 LAB
ANION GAP SERPL CALC-SCNC: 13 MEQ/L (ref 8–16)
BUN SERPL-MCNC: 17 MG/DL (ref 7–22)
CALCIUM SERPL-MCNC: 9.1 MG/DL (ref 8.5–10.5)
CHLORIDE SERPL-SCNC: 101 MEQ/L (ref 98–111)
CO2 SERPL-SCNC: 25 MEQ/L (ref 23–33)
CREAT SERPL-MCNC: 1 MG/DL (ref 0.4–1.2)
DEPRECATED RDW RBC AUTO: 51.1 FL (ref 35–45)
ERYTHROCYTE [DISTWIDTH] IN BLOOD BY AUTOMATED COUNT: 14.7 % (ref 11.5–14.5)
GFR SERPL CREATININE-BSD FRML MDRD: > 60 ML/MIN/1.73M2
GLUCOSE SERPL-MCNC: 102 MG/DL (ref 70–108)
HCT VFR BLD AUTO: 43.8 % (ref 42–52)
HEPARIN UNFRACTIONATED: 0.04 U/ML (ref 0.3–0.7)
HEPARIN UNFRACTIONATED: 0.26 U/ML (ref 0.3–0.7)
HGB BLD-MCNC: 13.7 GM/DL (ref 14–18)
MAGNESIUM SERPL-MCNC: 1.8 MG/DL (ref 1.6–2.4)
MCH RBC QN AUTO: 29.7 PG (ref 26–33)
MCHC RBC AUTO-ENTMCNC: 31.3 GM/DL (ref 32.2–35.5)
MCV RBC AUTO: 94.8 FL (ref 80–94)
NT-PROBNP SERPL IA-MCNC: 1125 PG/ML (ref 0–449)
PLATELET # BLD AUTO: 266 THOU/MM3 (ref 130–400)
PMV BLD AUTO: 9.3 FL (ref 9.4–12.4)
POTASSIUM SERPL-SCNC: 3.9 MEQ/L (ref 3.5–5.2)
RBC # BLD AUTO: 4.62 MILL/MM3 (ref 4.7–6.1)
SODIUM SERPL-SCNC: 139 MEQ/L (ref 135–145)
WBC # BLD AUTO: 7.4 THOU/MM3 (ref 4.8–10.8)

## 2023-05-19 PROCEDURE — 85027 COMPLETE CBC AUTOMATED: CPT

## 2023-05-19 PROCEDURE — 2500000003 HC RX 250 WO HCPCS

## 2023-05-19 PROCEDURE — 83880 ASSAY OF NATRIURETIC PEPTIDE: CPT

## 2023-05-19 PROCEDURE — 6360000002 HC RX W HCPCS

## 2023-05-19 PROCEDURE — C1894 INTRO/SHEATH, NON-LASER: HCPCS

## 2023-05-19 PROCEDURE — 6370000000 HC RX 637 (ALT 250 FOR IP): Performed by: PHYSICIAN ASSISTANT

## 2023-05-19 PROCEDURE — B2111ZZ FLUOROSCOPY OF MULTIPLE CORONARY ARTERIES USING LOW OSMOLAR CONTRAST: ICD-10-PCS | Performed by: INTERNAL MEDICINE

## 2023-05-19 PROCEDURE — 6360000002 HC RX W HCPCS: Performed by: STUDENT IN AN ORGANIZED HEALTH CARE EDUCATION/TRAINING PROGRAM

## 2023-05-19 PROCEDURE — 6370000000 HC RX 637 (ALT 250 FOR IP)

## 2023-05-19 PROCEDURE — B2151ZZ FLUOROSCOPY OF LEFT HEART USING LOW OSMOLAR CONTRAST: ICD-10-PCS | Performed by: INTERNAL MEDICINE

## 2023-05-19 PROCEDURE — 6360000004 HC RX CONTRAST MEDICATION: Performed by: INTERNAL MEDICINE

## 2023-05-19 PROCEDURE — 85520 HEPARIN ASSAY: CPT

## 2023-05-19 PROCEDURE — 2580000003 HC RX 258: Performed by: INTERNAL MEDICINE

## 2023-05-19 PROCEDURE — 6370000000 HC RX 637 (ALT 250 FOR IP): Performed by: STUDENT IN AN ORGANIZED HEALTH CARE EDUCATION/TRAINING PROGRAM

## 2023-05-19 PROCEDURE — 99232 SBSQ HOSP IP/OBS MODERATE 35: CPT | Performed by: PHYSICIAN ASSISTANT

## 2023-05-19 PROCEDURE — 80048 BASIC METABOLIC PNL TOTAL CA: CPT

## 2023-05-19 PROCEDURE — 93010 ELECTROCARDIOGRAM REPORT: CPT | Performed by: INTERNAL MEDICINE

## 2023-05-19 PROCEDURE — 93458 L HRT ARTERY/VENTRICLE ANGIO: CPT | Performed by: INTERNAL MEDICINE

## 2023-05-19 PROCEDURE — 2580000003 HC RX 258: Performed by: PHYSICIAN ASSISTANT

## 2023-05-19 PROCEDURE — 4A023N7 MEASUREMENT OF CARDIAC SAMPLING AND PRESSURE, LEFT HEART, PERCUTANEOUS APPROACH: ICD-10-PCS | Performed by: INTERNAL MEDICINE

## 2023-05-19 PROCEDURE — 93458 L HRT ARTERY/VENTRICLE ANGIO: CPT

## 2023-05-19 PROCEDURE — C1769 GUIDE WIRE: HCPCS

## 2023-05-19 PROCEDURE — 36415 COLL VENOUS BLD VENIPUNCTURE: CPT

## 2023-05-19 PROCEDURE — 83735 ASSAY OF MAGNESIUM: CPT

## 2023-05-19 RX ORDER — METOPROLOL SUCCINATE 50 MG/1
50 TABLET, EXTENDED RELEASE ORAL DAILY
Qty: 30 TABLET | Refills: 3 | Status: SHIPPED | OUTPATIENT
Start: 2023-05-20

## 2023-05-19 RX ORDER — ATROPINE SULFATE 0.4 MG/ML
0.5 INJECTION, SOLUTION INTRAVENOUS
Status: DISCONTINUED | OUTPATIENT
Start: 2023-05-19 | End: 2023-05-19 | Stop reason: HOSPADM

## 2023-05-19 RX ORDER — SODIUM CHLORIDE 0.9 % (FLUSH) 0.9 %
5-40 SYRINGE (ML) INJECTION EVERY 12 HOURS SCHEDULED
Status: DISCONTINUED | OUTPATIENT
Start: 2023-05-19 | End: 2023-05-19 | Stop reason: SDUPTHER

## 2023-05-19 RX ORDER — SPIRONOLACTONE 25 MG/1
25 TABLET ORAL DAILY
Qty: 30 TABLET | Refills: 1 | Status: SHIPPED | OUTPATIENT
Start: 2023-05-20

## 2023-05-19 RX ORDER — SODIUM CHLORIDE 9 MG/ML
INJECTION, SOLUTION INTRAVENOUS CONTINUOUS
Status: DISCONTINUED | OUTPATIENT
Start: 2023-05-19 | End: 2023-05-19 | Stop reason: HOSPADM

## 2023-05-19 RX ORDER — SODIUM CHLORIDE 0.9 % (FLUSH) 0.9 %
5-40 SYRINGE (ML) INJECTION PRN
Status: DISCONTINUED | OUTPATIENT
Start: 2023-05-19 | End: 2023-05-19 | Stop reason: SDUPTHER

## 2023-05-19 RX ORDER — SODIUM CHLORIDE 9 MG/ML
INJECTION, SOLUTION INTRAVENOUS PRN
Status: DISCONTINUED | OUTPATIENT
Start: 2023-05-19 | End: 2023-05-19 | Stop reason: SDUPTHER

## 2023-05-19 RX ORDER — METOPROLOL SUCCINATE 50 MG/1
50 TABLET, EXTENDED RELEASE ORAL DAILY
Qty: 30 TABLET | Refills: 3 | Status: SHIPPED | OUTPATIENT
Start: 2023-05-19

## 2023-05-19 RX ORDER — SODIUM CHLORIDE 9 MG/ML
INJECTION, SOLUTION INTRAVENOUS PRN
Status: DISCONTINUED | OUTPATIENT
Start: 2023-05-19 | End: 2023-05-19 | Stop reason: HOSPADM

## 2023-05-19 RX ORDER — ACETAMINOPHEN 325 MG/1
650 TABLET ORAL EVERY 4 HOURS PRN
Status: DISCONTINUED | OUTPATIENT
Start: 2023-05-19 | End: 2023-05-19 | Stop reason: SDUPTHER

## 2023-05-19 RX ORDER — SPIRONOLACTONE 25 MG/1
25 TABLET ORAL DAILY
Qty: 30 TABLET | Refills: 3 | Status: SHIPPED | OUTPATIENT
Start: 2023-05-19

## 2023-05-19 RX ADMIN — SACUBITRIL AND VALSARTAN 1 TABLET: 24; 26 TABLET, FILM COATED ORAL at 08:25

## 2023-05-19 RX ADMIN — SODIUM CHLORIDE, PRESERVATIVE FREE 10 ML: 5 INJECTION INTRAVENOUS at 08:26

## 2023-05-19 RX ADMIN — HEPARIN SODIUM 12 UNITS/KG/HR: 10000 INJECTION, SOLUTION INTRAVENOUS at 01:16

## 2023-05-19 RX ADMIN — SPIRONOLACTONE 25 MG: 25 TABLET ORAL at 08:26

## 2023-05-19 RX ADMIN — IOPAMIDOL 40 ML: 755 INJECTION, SOLUTION INTRAVENOUS at 10:52

## 2023-05-19 RX ADMIN — SODIUM CHLORIDE: 9 INJECTION, SOLUTION INTRAVENOUS at 11:38

## 2023-05-19 RX ADMIN — FUROSEMIDE 20 MG: 20 TABLET ORAL at 08:26

## 2023-05-19 RX ADMIN — METOPROLOL SUCCINATE 50 MG: 50 TABLET, EXTENDED RELEASE ORAL at 08:25

## 2023-05-19 RX ADMIN — HEPARIN SODIUM 2000 UNITS: 1000 INJECTION INTRAVENOUS; SUBCUTANEOUS at 08:30

## 2023-05-19 NOTE — PLAN OF CARE
Problem: Discharge Planning  Goal: Discharge to home or other facility with appropriate resources  Outcome: Progressing  Flowsheets (Taken 5/19/2023 1408)  Discharge to home or other facility with appropriate resources:   Identify barriers to discharge with patient and caregiver   Arrange for needed discharge resources and transportation as appropriate  Note: Possible d/c later today or tomorrow      Problem: Skin/Tissue Integrity  Goal: Absence of new skin breakdown  Description: 1. Monitor for areas of redness and/or skin breakdown  2. Assess vascular access sites hourly  3. Every 4-6 hours minimum:  Change oxygen saturation probe site  4. Every 4-6 hours:  If on nasal continuous positive airway pressure, respiratory therapy assess nares and determine need for appliance change or resting period.   Outcome: Progressing     Problem: ABCDS Injury Assessment  Goal: Absence of physical injury  Outcome: Progressing  Flowsheets (Taken 5/19/2023 0800)  Absence of Physical Injury: Implement safety measures based on patient assessment     Problem: Cardiovascular - Adult  Goal: Maintains optimal cardiac output and hemodynamic stability  Outcome: Progressing  Flowsheets  Taken 5/19/2023 1408  Maintains optimal cardiac output and hemodynamic stability: Monitor blood pressure and heart rate  Taken 5/19/2023 0821  Maintains optimal cardiac output and hemodynamic stability: Monitor blood pressure and heart rate     Problem: Cardiovascular - Adult  Goal: Absence of cardiac dysrhythmias or at baseline  Outcome: Progressing  Flowsheets  Taken 5/19/2023 1408  Absence of cardiac dysrhythmias or at baseline: Monitor cardiac rate and rhythm  Taken 5/19/2023 0821  Absence of cardiac dysrhythmias or at baseline: Monitor cardiac rate and rhythm     Problem: Safety - Adult  Goal: Free from fall injury  Outcome: Progressing  Flowsheets  Taken 5/19/2023 1408  Free From Fall Injury: Instruct family/caregiver on patient safety  Taken

## 2023-05-19 NOTE — DISCHARGE INSTRUCTIONS
F/up dr gresham 2 weeks  BMP 1 week      Discharge Instructions for Radial Heart Catherization    1. Take it easy for 3-4 days. 2.  No driving for 2 days. 3.  No lifting of 5 lbs or more for 5 days with the affected arm. 4.  Can shower after 24 hours. 5.  Remove arm board after 24 hours. 6.  Apply a band aid to the insertion site daily for 5 days. May apply antibiotic ointment if desired, but not necessary. Wash site daily with soap and water. 7.  No creams, ointments, or powders near the insertion site. 8.  No tub baths, swimming, hot tubs, or hand washing dishes for 1 week. 9.  Watch for signs of infection (redness, warmth, swelling, or pus drainage) or coolness of extremity and call physician if this occurs  10. If bleeding occurs from insertion site, apply pressure and call 911.

## 2023-05-19 NOTE — BRIEF OP NOTE
[address]  Sedation/Analgesia Post Sedation Record    Pt Name: Benja Foster  Account number: [de-identified]  MRN: [id number]  YOB: 1940  Procedure Performed By: Bud Mace MD MD   Primary Care Physician: Rowan Barbosa MD  Date: 5/19/2023    POST-PROCEDURE    Physicians/Assistants: Bud Mace MD MD     Procedure Performed:Cath      Sedation/Anesthesia: Versed/ Fentanyl and 2% xylocaine local anesthesia. Estimated Blood Loss: < 50 ml. Specimens Removed: None         Disposition of Specimen: N/A        Complications: No Immediate Complications. Post-procedure Diagnosis/Findings:       No significant CAD   LVEDP 6 mmHg    Recommendations:  Medical management and risk factor modification   Hold anticoagulants, may resume tonight after 9 pm     Above findings and plan of care were discussed with patient, questions were answered, agreeable with plan.        Bud Mace MD, [facility]   Electronically signed 5/19/2023 at 10:49 AM  Interventional Cardiology

## 2023-05-19 NOTE — PROCEDURES
[address] [id number]                            CARDIAC CATHETERIZATION    PATIENT NAME: Kyra Lee                  :        1940  MED REC NO:   [id number]                           ROOM:       0028  ACCOUNT NO:   [de-identified]                           ADMIT DATE: 2023  PROVIDER:     Trista Lemus MD    DATE OF PROCEDURE:  2023    INDICATION FOR PROCEDURE:  This is an 81-year-old male patient with  multiple risk factors for coronary artery disease, who was admitted with  acute congestive heart failure, atrial fibrillation, dyspnea on  exertion, and angina equivalent symptoms. Echocardiogram revealed  borderline ejection fraction at around 50%. He was referred for left  cardiac catheterization. PROCEDURES PERFORMED:  1. Left cardiac catheterization with selective coronary angiogram.  2.  Left ventriculogram.    DESCRIPTION OF PROCEDURE:  After informed consent, the patient was  brought to the cardiac catheterization room. He was prepped and draped  in a sterile fashion. A 2% lidocaine was injected in the skin and  subcutaneous tissue overlying the right radial artery. Under ultrasound  guidance using modified Seldinger technique, access was obtained in the  right radial artery. A 5/6 Slender sheath was inserted. Standard  antithrombotic/antispasmodic medications were given. I used 6-[place]  JR4 and 6-[place]ian JL3.5 diagnostic catheters to complete the procedure. FINDINGS:  LEFT VENTRICULOGRAM:  No regional wall motion abnormality. Ejection  fraction of 50%. HEMODYNAMICS:  Left ventricular end-diastolic pressure 6 mmHg. No  significant pressure gradient across the aortic valve upon pullback. CORONARY ANGIOGRAM:  1. Right coronary artery, relatively small nondominant vessel, patent  without significant stenosis.   2.  Left main coronary artery, patent, gives rise to left circumflex

## 2023-05-19 NOTE — PROGRESS NOTES
[address] [address]  Sedation/Analgesia History & Physical    Pt Name: Janet Merrill  Account number: [de-identified]  MRN: [id number]  YOB: 1940  Provider Performing Procedure: Elizabeth Melissa MD MD [facility]  Primary Care Physician: Linda Sarah MD  Date: 5/18/2023    PRE-PROCEDURE    Code Status: FULL CODE  Brief History/Pre-Procedure Diagnosis: Afib    Consent: : I have discussed with the patient risks, benefits, and alternatives (and relevant risks, benefits, and side effects related to alternatives or not receiving care), and likelihood of the success. The patient and/or representative appear to understand and agree to proceed. The discussion encompasses risks, benefits, and side effects related to the alternatives and the risks related to not receiving the proposed care, treatment, and services. PLANNED PROCEDURE   []Cath  []PCI                []Pacemaker/AICD  [x]MARIA A             [x]Cardioversion []Peripheral angiography/PTA  []Other:      VITAL SIGNS   Vitals:    05/18/23 1129   BP: 117/89   Pulse: 84   Resp: 16   Temp: 97.4 °F (36.3 °C)   SpO2: 96%       PHYSICAL:   General: No acute distress  HEENT:  Unremarkable for age  Neck: without increased JVD, carotid pulses 2+ bilaterally without bruits  Heart: irregular S1 & S2 WNL   Lungs: Clear to auscultation    Abdomen: BS present, without HSM, masses, or tenderness    Extremities: without C,C,E.  Pulses 2+ bilaterally  Mental Status: Alert & Oriented    SEDATION  Planned agent:[x]Midazolam []Meperidine []Sublimaze []Morphine  []Diazepam  [x]Other: fentanyl      ASA Classification:  []1 []2 [x]3 []4 []5  Class 1: A normal healthy patient  Class 2: Pt with mild to moderate systemic disease  Class 3: Severe systemic disease or disturbance  Class 4: Severe systemic disorders that are already life threatening. Class 5: Moribund pt with little chances of survival, for more than 24 hours.   Mallampati I Airway Classification:   []1 []2 [x]3
Cardiology Progress Note      Patient:  Ramirez Ace  YOB: 1940  MRN: [id number]   Acct: [de-identified]  Admit Date:  5/16/2023  Primary Cardiologist:  none    Note per dr gresham \"HPI: This is a pleasant 81 y.o. male w/ BPH, CKD 3, HLD, HTN, former tobacco use who presents with CC: Leg edema x1 month and shortness of breath. Cardiology is consulted for new onset A-fib with RVR and new onset CHF. Patient just lost his wife about a year ago. His family states that he does not see the doctor very often. They believe that he is afraid that if he is diagnosed with anything his family will put him in a home. He currently lives with his son, who brought home yesterday to find his father gone and did not know why. This was apparently because father had decided to come to the ED and be seen for his painful leg edema. Patient states that he is quite active at home. Has no problems walking around or performing ADLs. He is hard of hearing and interview had to be conducted by yelling at him. But he is a pleasant individual perhaps a little out of touch with the necessity of receiving regular medical care. He has no complaints other than swelling. Denies chest pain. His family states that he has complained about shortness of breath, dizziness, back pain, and leg pain for at least a month. Patient is currently on heparin drip and diltiazem drip. Maintaining a pulse of 80. CXR shows cardiomegaly with mild pulmonary vascular congestion. EKG shows A-fib with RVR, RBBB, nonspecific T wave abnormality. Tropes are negative. Patient is overall normotensive. Urinated 2.9 L overnight. Labs are notable for TSH 4.51, T4 (free) 1.0, cholesterol 109, HDL 54, LDL 46, triglycerides 47 Hgb 11.5, MCV 98.2, ferritin 32, iron 42, sat 13, TIBC 322 ChadsVasc: 4\"    Subjective (Events in last 24 hours):   Pt awake and alert. NAD. No cp or sob. Edema resolved.   On RA    Net I/o -9.4 L, no input
Discussed discharge summary with patient and patients son. Instructed patient about medications & follow up appointments. Patient denies any additional questions. Patient was discharged with all belongings. No distress noted. Patient discharged to home. Taken down to the vehicle by transporter per wheelchair. Patients son picking up medications from the pharmacy at [facility].
Echocardiogram complete at bedside.
Educated patient regarding heart failure management by explaining the importance of obtaining daily weights at the same time every day with approximately the same amount of clothing, how to recognize symptoms, low sodium diet and taking prescribed medications as ordered. Patient was given our heart failure booklet. Patient was receptive to the education given and verbalized understanding.
Instructed on radial heart cath site care.
[facility] PROGRESS NOTE      Patient: Joaquin Flor  Room #: 1S-41/181-E            YOB: 1940  Age: 81 y.o. Gender: male            Admit Date & Time: 5/16/2023  4:01 PM   05/18/23 1340   Encounter Summary   Encounter Overview/Reason  Initial Encounter   Service Provided For: Patient   Referral/Consult From: Rounding   Last Encounter  05/18/23   Complexity of Encounter Moderate   Encounter    Type Initial Screen/Assessment   Spiritual/Emotional needs   Type Spiritual Support   Assessment/Intervention/Outcome   Assessment Calm;Coping   Intervention Active listening;Prayer (assurance of)/Providence;Nurtured Hope   Outcome Comfort; Acceptance;Encouraged;Engaged in conversation     Assessment:  Meagan Brown is an 81year old male who is sitting up in a chair on 3b and is Hard of Hearing. He reviewed with this  his Christian affiliation stated he has not been to "[employer] for a long time. Interventions:  After listening and having conversation , this  asked if we can pray for his healing together. He agreed. Outcomes:  He is thankful for the spiritual care contact. Plan: 1. Care Plan:  Continue spiritual and emotional care for patient and family. Including prayers.       Electronically signed by Sheri Ruby on 5/18/2023 at 4:34 PM.  [facility] [facility]  [phone]
PROGRESS NOTE      Patient:  Nitin Sanders      Unit/Bed:3B-28/028-A    YOB: 1940    MRN: [id number]       Acct: [de-identified]     PCP: Surekha Jose MD    Date of Admission: 5/16/2023      Assessment/Plan:      Active Hospital Problems    Diagnosis Date Noted    New onset atrial fibrillation ([facility] [address] [I48.91] 05/17/2023    Acute clinical systolic heart failure ([address] [I50.21] 05/16/2023       New onset heart failure with preserved ejection fraction, diastolic dysfunction: Patient presented with 2+ BLE pitting edema for over a month, extending up to his thighs. Reports swelling is improving 5/19. Has had output of over 9.5L since starting diuresis. CXR 5/16 with pulmonary vascular congestion, cardiomegaly. TTE 5/18/23 shows EF 50%. Continue PO Lasix 20mg daily. Continue Aldactone 25mg daily. Was previously on HCTZ 12.5 BID at home. Strict I's and O's. Monitor output. New onset Atrial fibrillation with RVR: EKG 5/16 showed A-fib with RVR. Currently rate controlled on diltiazem drip, metoprolol, heparin drip. Magnesium 1.8 this AM, potassium 3.9. TSH elevated, free T4 normal. Cardiology following. Patient had unsuccessful cardioversion 5/18. Patient was shocked 3 times with no success, and went into A-flutter. Patient to undergo LHC today 5/19. Continue Entresto 24-26 BID and Toprol XL 50mg daily. Per cardiology, \"Could consider DCCV after loading him with amiodarone in 2 weeks. \" Plan to start pt on Eliquis. ChadsVasc: 4. Plan to refer to outpatient CHF clinic and cardiology on discharge. Macrocytic anemia: Hgb 13.7 this AM. Vitamin B12 & Folate both WNL. Iron saturation low at 13, iron low at 42. TIBC and Ferritin WNL. Hypertension: On home HCTZ 12.5mg BID. Not started inpatient. Hyperlipidemia: Continue Lipitor 40mg daily. Lipid panel WNL 5/17. Vitamin D deficiency: Vitamin D noted to be low 26 on AM labs.       Chief Complaint: leg swelling    Hospital Course:     Per H&P:    Vince Banda
PROGRESS NOTE      Patient:  Sheri Boudreaux      Unit/Bed:3B-28/028-A    YOB: 1940    MRN: [id number]       Acct: [de-identified]     PCP: Abhay Dawson MD    Date of Admission: 5/16/2023      Assessment/Plan:      Active Hospital Problems    Diagnosis Date Noted    New onset atrial fibrillation ([facility] [address] [I48.91] 05/17/2023    Acute clinical systolic heart failure ([address] [I50.21] 05/16/2023       New onset heart failure with preserved ejection fraction, diastolic dysfunction: Patient presented with 2+ BLE pitting edema for over a month, extending up to his thighs. Reports swelling has improved 5/18. Has had output of over 7L since starting diuresis. Calves non-tender, DVT unlikely. CXR 5/16 with pulmonary vascular congestion, cardiomegaly. TTE 5/18/23 shows EF 50%. Will transition from IV Lasix 20mg BID to PO Lasix 20mg daily as volume status has improved. Continue Aldactone 25mg daily. Was previously on HCTZ 12.5 BID at home. Strict I's and O's. Monitor output. New onset Atrial fibrillation with RVR: EKG 5/16 showed A-fib with RVR. Currently rate controlled on diltiazem drip, metoprolol, heparin drip. Magnesium 1.6 this AM. TSH elevated, free T4 normal. Cardiology following. Patient had unsuccessful cardioversion today. Patient was shocked 3 times with no success, and went into A-flutter. Per Pastora Parker, Cardiology, plan to do [facility] tomorrow 5/18 instead of a stress test. Continue Entresto 24-26 BID and increase Toprol XL to 50mg daily. Plan to start pt on Eliquis. ChadsVasc: 4. Plan to refer to outpatient CHF clinic and cardiology on discharge. Macrocytic anemia: Hgb 13.5 this AM. Vitamin B12 & Folate both WNL. Iron saturation low at 13, iron low at 42. TIBC and Ferritin WNL. Hypertension: On home HCTZ 12.5mg BID. Not started inpatient. Hyperlipidemia: Continue Lipitor 40mg daily. Lipid panel WNL 5/17. Vitamin D deficiency: Vitamin D noted to be low 26 on AM labs.       Chief Complaint: leg
Pharmacy Medication History Note      List of current medications patient is taking is complete. Source of information: Patient    Changes made to medication list:  Medications removed (include reason, ex. therapy complete or physician discontinued):  Simvastatin - changed to atorvastatin  Tramadol- therapy complete    Medications added/doses adjusted:  Changed hydrochlorothiazide from 12.5 mg to 25 mg daily  Added atorvastatin 40 mg daily  Added tamsulosin 0.4 mg daily  Added Mucinex DM 1 tablet at bedtime  Added Tylenol 650 mg q8h PRN pain    Other notes (ex. Recent course of antibiotics, Coumadin dosing):  Denies use of other OTC or herbal medications.       Allergies reviewed      Electronically signed by MICKI Pichardo [facility] on 5/16/2023 at 7:31 PM
per 24 hour   Intake --   Output 3300 ml   Net -3300 ml       Diet:  ADULT DIET; Regular; No Added Salt (3-4 gm)  Diet NPO    Exam:  /81   Pulse 87   Temp 98 °F (36.7 °C) (Oral)   Resp 16   Wt 189 lb (85.7 kg)   SpO2 95%   BMI 25.63 kg/m²     General appearance: No apparent distress, appears stated age and cooperative. HEENT: Normocephalic, atraumatic. Conjunctivae/corneas clear. Neck: Supple, with full range of motion. No jugular venous distention. Trachea midline. Respiratory:  Normal respiratory effort. Clear to auscultation, bilaterally without Rales/Wheezes/Rhonchi. Cardiovascular: Regular rate, rhythm irregular, with normal S1/S2 without murmurs, rubs or gallops. Abdomen: Soft, non-tender, non-distended with normal bowel sounds. Musculoskeletal: passive and active ROM x 4 extremities. BLE 2+ pitting edema  Skin: Skin color, texture, turgor normal.  No rashes or lesions. Neurologic:  Neurovascularly intact without any focal sensory/motor deficits. Psychiatric: Alert and oriented, thought content appropriate, normal insight      Labs:   Recent Labs     05/16/23  1700 05/16/23  1854 05/17/23  0819   WBC 4.7* 4.8 5.2   HGB 11.1* 11.6* 11.5*   HCT 36.7* 38.2* 38.2*    197 225     Recent Labs     05/16/23  1700 05/17/23  0819    138   K 3.9 3.8    100   CO2 24 24   BUN 24* 19   CREATININE 1.1 1.1   CALCIUM 9.1 9.1     Recent Labs     05/16/23  1700   AST 20   ALT 19   BILITOT 0.7   ALKPHOS 169*     Recent Labs     05/16/23  1933 05/17/23  0819   INR 1.14* 1.18*     No results for input(s): Maricarmen Paz in the last 72 hours. Urinalysis:      Lab Results   Component Value Date/Time    NITRU NEGATIVE 09/05/2015 08:44 PM    BLOODU NEGATIVE 09/05/2015 08:44 PM    GLUCOSEU NEGATIVE 09/05/2015 08:44 PM       Radiology:  XR CHEST PORTABLE   Final Result   Cardiomegaly with mild pulmonary vascular congestion.          **This report has been created using voice recognition
unknown duration - now cvr  HTN  HLD    Discussed with dr gresham  Plan:    Daily I/o and weights  2 liter fluid restriction and 2gm sodium diet  Keep mag >2 and k >4, replace prn  Cont diuresis  Daily BMP  cont BB/entresto/statin/aldactone  Cont heparin gtt  Need OAC upon discharge - pt understands risk of bleeding and accepts risk to reduce risk of embolic phenomenon   Cont cdz gtt for now  Npo  MARIA A/CV today  Stress test today  Elevate legs, wrap legs     Electronically signed by Denice Sethi PA-C on 5/18/2023 at 11:36 AM

## 2023-05-19 NOTE — CARE COORDINATION
5/19/23, 1:50 PM EDT    DISCHARGE ON GOING EVALUATION    [facility]bie [facility]duff[facility] day: 3  Location: -28/028-A Reason for admit: New onset atrial fibrillation ([address] [I48.91]  Bilateral leg edema [R60.0]  Atrial fibrillation with RVR ([address] [I48.91]  Acute clinical systolic heart failure ([address] [I50.21]  Acute congestive heart failure, unspecified heart failure type ([address] [I50.9]   Procedure:   5/16 CXR: Cardiomegaly with mild pulmonary vascular congestion. 5/17 ECHO: EF 50%. 5/19 Cardiac cath: No significant coronary artery disease. Barriers to Discharge: Hospitalist and Cardiology following. Heart cath was clean. Anticipate discharge home today. PCP: Faheem Piper MD  Readmission Risk Score: 11.2%  Patient Goals/Plan/Treatment Preferences: Plans home with son and HF clinic. 5/19/23, 1:54 PM EDT    Patient goals/plan/ treatment preferences discussed by  and . Patient goals/plan/ treatment preferences reviewed with patient/ family. Patient/ family verbalize understanding of discharge plan and are in agreement with goal/plan/treatment preferences. Understanding was demonstrated using the teach back method. AVS provided by RN at time of discharge, which includes all necessary medical information pertaining to the patients current course of illness, treatment, post-discharge goals of care, and treatment preferences.      Services At/After Discharge: None       IMM Letter  IMM Letter given to Patient/Family/Significant other/Guardian/POA/by[de-identified] Darrell Case RN, CM  IMM Letter date given[de-identified] 05/19/23  IMM Letter time given[de-identified] 6091

## 2023-05-19 NOTE — H&P
WBC 4.7*   HGB 11.1*   HCT 36.7*        Recent Labs     05/16/23  1700      K 3.9      CO2 24   BUN 24*   CREATININE 1.1   CALCIUM 9.1     Recent Labs     05/16/23  1700   AST 20   ALT 19   BILITOT 0.7   ALKPHOS 169*     No results for input(s): INR in the last 72 hours. No results for input(s): [place] Bounds in the last 72 hours. Urinalysis:    Lab Results   Component Value Date/Time    NITRU NEGATIVE 09/05/2015 08:44 PM    BLOODU NEGATIVE 09/05/2015 08:44 PM    GLUCOSEU NEGATIVE 09/05/2015 08:44 PM       Radiology:   XR CHEST PORTABLE   Final Result   Cardiomegaly with mild pulmonary vascular congestion. **This report has been created using voice recognition software. It may contain minor errors which are inherent in voice recognition technology. **      Final report electronically signed by Dr Denise Brown on 5/16/2023 5:21 PM            EKG:  Atrial fibrillation with rapid ventricular response with premature ventricular or aberrantly conducted complexes    Electronically signed by Yumi Gay MD on 5/16/2023 at 6:03 PM
2,000 Units, 2,000 Units, IntraVENous, PRN, Jen Sam MD, 2,000 Units at 05/19/23 0830    heparin 25,000 units in dextrose 5% 250 mL (premix) infusion, 5-30 Units/kg/hr, IntraVENous, Continuous, Jen Sam MD, Last Rate: 11.4 mL/hr at 05/19/23 0830, 14 Units/kg/hr at 05/19/23 0830  Prior to Admission medications    Medication Sig Start Date End Date Taking? Authorizing Provider   atorvastatin (LIPITOR) 40 MG tablet Take 1 tablet by mouth daily   Yes Historical Provider, MD   tamsulosin (FLOMAX) 0.4 MG capsule Take 1 capsule by mouth daily   Yes Historical Provider, MD   dextromethorphan-guaiFENesin (MUCINEX DM)  MG per extended release tablet Take 1 tablet by mouth nightly   Yes Historical Provider, MD   acetaminophen (TYLENOL) 325 MG tablet Take 2 tablets by mouth every 8 hours as needed for Pain   Yes Historical Provider, MD   hydrochlorothiazide (MICROZIDE) 12.5 MG capsule Take 2 capsules by mouth every morning    Historical Provider, MD     Additional information:       VITAL SIGNS   Vitals:    05/19/23 0821   BP: 137/85   Pulse: (!) 105   Resp: 18   Temp: 97.5 °F (36.4 °C)   SpO2: 94%       PHYSICAL:   General: No acute distress  HEENT:  Unremarkable for age  Neck: without increased JVD, carotid pulses 2+ bilaterally without bruits  Heart: IIR, S1 & S2 WNL.  No murmurs    Lungs: Clear to auscultation    Abdomen: BS present, without HSM, masses, or tenderness    Extremities: without C,C,E.  Pulses 2+ bilaterally   Mental Status: Alert & Oriented        PLANNED PROCEDURE   [x]Cath  [x]PCI                []Pacemaker/AICD  []MARIA A             []Cardioversion []Peripheral angiography/PTA  []Other:      SEDATION  Planned agent:[x]Midazolam []Meperidine []Sublimaze []Morphine  []Diazepam  []Other:     ASA Classification:  []1 []2 [x]3 []4 []5   Class 1: A normal healthy patient  Class 2: Pt with mild to moderate systemic disease  Class 3: Severe systemic disease or disturbance  Class 4: Severe

## 2023-05-20 LAB
EKG ATRIAL RATE: 312 BPM
EKG P AXIS: 112 DEGREES
EKG Q-T INTERVAL: 400 MS
EKG QRS DURATION: 96 MS
EKG QTC CALCULATION (BAZETT): 456 MS
EKG R AXIS: -67 DEGREES
EKG T AXIS: 43 DEGREES
EKG VENTRICULAR RATE: 78 BPM

## 2023-05-20 NOTE — DISCHARGE SUMMARY
medications were sent to [facility] [address], [address] [facility]  [address]  [address], [address] [address]      Phone: [phone]   apixaban 5 MG Tabs tablet  metoprolol succinate 50 MG extended release tablet  sacubitril-valsartan 24-26 MG per tablet  spironolactone 25 MG tablet         Time Spent on discharge is more than 20 minutes in the examination, evaluation, counseling and review of medications and discharge plan. Signed: Thank you Linda Sarah MD for the opportunity to be involved in this patient's care.     Electronically signed by Sara Le DO on 5/19/2023 at 10:22 PM

## 2023-05-25 PROBLEM — I50.9 ACUTE CONGESTIVE HEART FAILURE (HCC): Status: ACTIVE | Noted: 2023-05-25

## 2023-06-05 NOTE — PROGRESS NOTES
[place] [facility] PROFESSIONAL SERVICES  [facility]   [address]   [address] [address]   Dept: [phone]   Dept Fax: [phone]: [phone]      Chief Complaint   Patient presents with    Follow-Up from Hospital     Cardiologist:  Dr. Michele Rodríguez  82 yo male presents for hfu for acute D CHF, had LHC with no signfiicant CAD, LVEDP 6. New onset afib, likely chronic. Still fairly weak, lost a lot of weight recently. (Diuresed most likely cause of weight loss). Not noticing any palpitations. No chest pain. Symptoms are much better. General:   No fever, no chills, no weight loss, no fatigue  Pulmonary:    No dyspnea, no wheezing  Cardiac:    Denies recent chest pain   GI:     No nausea or vomiting, no abdominal pain  Neuro:      No dizziness or light headedness  Musculoskeletal:  No recent active issues  Extremities:   No edema      Past Medical History:   Diagnosis Date    Hyperlipidemia     Hypertension        Allergies   Allergen Reactions    Sulfa Antibiotics Rash       Current Outpatient Medications   Medication Sig Dispense Refill    metoprolol succinate (TOPROL XL) 50 MG extended release tablet Take 1 tablet by mouth daily 30 tablet 3    apixaban (ELIQUIS) 5 MG TABS tablet Take 1 tablet by mouth 2 times daily 60 tablet 1    sacubitril-valsartan (ENTRESTO) 24-26 MG per tablet Take 1 tablet by mouth 2 times daily 60 tablet 3    spironolactone (ALDACTONE) 25 MG tablet Take 1 tablet by mouth daily 30 tablet 3    atorvastatin (LIPITOR) 40 MG tablet Take 1 tablet by mouth daily      tamsulosin (FLOMAX) 0.4 MG capsule Take 1 capsule by mouth daily      dextromethorphan-guaiFENesin (MUCINEX DM)  MG per extended release tablet Take 1 tablet by mouth nightly      acetaminophen (TYLENOL) 325 MG tablet Take 2 tablets by mouth every 8 hours as needed for Pain       No current facility-administered medications for this visit.        Social History     Socioeconomic History

## 2023-06-07 ENCOUNTER — OFFICE VISIT (OUTPATIENT)
Dept: CARDIOLOGY CLINIC | Age: 83
End: 2023-06-07
Payer: MEDICARE

## 2023-06-07 VITALS
HEART RATE: 90 BPM | HEIGHT: 72 IN | BODY MASS INDEX: 23.16 KG/M2 | SYSTOLIC BLOOD PRESSURE: 109 MMHG | WEIGHT: 171 LBS | DIASTOLIC BLOOD PRESSURE: 72 MMHG

## 2023-06-07 DIAGNOSIS — I50.31 ACUTE DIASTOLIC CONGESTIVE HEART FAILURE (HCC): ICD-10-CM

## 2023-06-07 DIAGNOSIS — I48.91 NEW ONSET ATRIAL FIBRILLATION (HCC): Primary | ICD-10-CM

## 2023-06-07 PROCEDURE — 99214 OFFICE O/P EST MOD 30 MIN: CPT | Performed by: STUDENT IN AN ORGANIZED HEALTH CARE EDUCATION/TRAINING PROGRAM

## 2023-06-07 PROCEDURE — 1123F ACP DISCUSS/DSCN MKR DOCD: CPT | Performed by: STUDENT IN AN ORGANIZED HEALTH CARE EDUCATION/TRAINING PROGRAM

## 2023-07-06 ENCOUNTER — HOSPITAL ENCOUNTER (EMERGENCY)
Age: 83
Discharge: HOME | End: 2023-07-06
Payer: MEDICARE

## 2023-07-06 ENCOUNTER — TELEPHONE (OUTPATIENT)
Dept: CARDIOLOGY CLINIC | Age: 83
End: 2023-07-06

## 2023-07-06 VITALS
OXYGEN SATURATION: 98 % | TEMPERATURE: 98.6 F | WEIGHT: 172 LBS | BODY MASS INDEX: 23.33 KG/M2 | DIASTOLIC BLOOD PRESSURE: 78 MMHG | SYSTOLIC BLOOD PRESSURE: 138 MMHG | HEART RATE: 89 BPM | RESPIRATION RATE: 19 BRPM

## 2023-07-06 DIAGNOSIS — S61.216A LACERATION OF RIGHT LITTLE FINGER WITHOUT FOREIGN BODY WITHOUT DAMAGE TO NAIL, INITIAL ENCOUNTER: Primary | ICD-10-CM

## 2023-07-06 PROCEDURE — 99282 EMERGENCY DEPT VISIT SF MDM: CPT

## 2023-07-06 ASSESSMENT — PAIN - FUNCTIONAL ASSESSMENT: PAIN_FUNCTIONAL_ASSESSMENT: NONE - DENIES PAIN

## 2023-07-07 NOTE — DISCHARGE INSTRUCTIONS
Leave dressing in place x 8-12 hours. Then change dressing, wash wound and apply antibiotic ointment. Follow up as directed.

## 2023-07-07 NOTE — ED TRIAGE NOTES
Pt arrives to ED from home with c/o finger lact on left hand  Pt reports scissors slipped from his hand about an hour ago, is on blood thinners  Bleeding controlled on arrival

## 2023-07-10 NOTE — ED PROVIDER NOTES
[address] [facility] EMERGENCY DEPT      EMERGENCY MEDICINE     Pt Name: Ankita Kerr  MRN: [id number]  [address] [facility] 1940  Date of evaluation: 7/6/2023  Provider: ANASTASIA Finney CNP    CHIEF COMPLAINT       Chief Complaint   Patient presents with    Finger Laceration     HISTORY OF PRESENT ILLNESS   Ankita Kerr is a pleasant 81 y.o. male who presents to the emergency department from home with c/o right fourth and fifth digit laceration. The patient states he was opening a package and the knife slipped. Got him right behind the nail on the fifth digits and right across the pad of the fourth digit. Is on thinners. Bleeding controlled at this time        History is obtained from:  patient  PASTMEDICAL HISTORY     Past Medical History:   Diagnosis Date    Hyperlipidemia     Hypertension        Patient Active Problem List   Diagnosis Code    Acute clinical systolic heart failure ([address]) I50.21    New onset atrial fibrillation (HCC) I48.91    Acute congestive heart failure (HCC) I50.9     SURGICAL HISTORY     History reviewed. No pertinent surgical history.     CURRENT MEDICATIONS       Discharge Medication List as of 7/6/2023 11:44 PM        CONTINUE these medications which have NOT CHANGED    Details   metoprolol succinate (TOPROL XL) 50 MG extended release tablet Take 1 tablet by mouth daily, Disp-30 tablet, R-3Normal      apixaban (ELIQUIS) 5 MG TABS tablet Take 1 tablet by mouth 2 times daily, Disp-60 tablet, R-1Normal      sacubitril-valsartan (ENTRESTO) 24-26 MG per tablet Take 1 tablet by mouth 2 times daily, Disp-60 tablet, R-3Normal      spironolactone (ALDACTONE) 25 MG tablet Take 1 tablet by mouth daily, Disp-30 tablet, R-3Normal      atorvastatin (LIPITOR) 40 MG tablet Take 1 tablet by mouth dailyHistorical Med      tamsulosin (FLOMAX) 0.4 MG capsule Take 1 capsule by mouth dailyHistorical Med      dextromethorphan-guaiFENesin (MUCINEX DM)  MG per extended release tablet Take 1 tablet by

## 2023-07-27 LAB
BUN BLDV-MCNC: 33 MG/DL
CALCIUM SERPL-MCNC: 9 MG/DL
CHLORIDE BLD-SCNC: 108 MMOL/L
CO2: 23 MMOL/L
CREAT SERPL-MCNC: 1.49 MG/DL
EGFR: 45
GLUCOSE BLD-MCNC: 90 MG/DL
POTASSIUM SERPL-SCNC: 4.7 MMOL/L
SODIUM BLD-SCNC: 139 MMOL/L

## 2023-08-17 ENCOUNTER — OFFICE VISIT (OUTPATIENT)
Dept: CARDIOLOGY CLINIC | Age: 83
End: 2023-08-17
Payer: MEDICARE

## 2023-08-17 VITALS
HEART RATE: 57 BPM | BODY MASS INDEX: 23.33 KG/M2 | WEIGHT: 172 LBS | OXYGEN SATURATION: 98 % | DIASTOLIC BLOOD PRESSURE: 70 MMHG | SYSTOLIC BLOOD PRESSURE: 126 MMHG

## 2023-08-17 DIAGNOSIS — I10 ESSENTIAL HYPERTENSION: ICD-10-CM

## 2023-08-17 DIAGNOSIS — I50.32 CHF NYHA CLASS II, CHRONIC, DIASTOLIC (HCC): Primary | ICD-10-CM

## 2023-08-17 DIAGNOSIS — I48.19 PERSISTENT ATRIAL FIBRILLATION (HCC): ICD-10-CM

## 2023-08-17 PROCEDURE — 99215 OFFICE O/P EST HI 40 MIN: CPT | Performed by: NURSE PRACTITIONER

## 2023-08-17 PROCEDURE — 1123F ACP DISCUSS/DSCN MKR DOCD: CPT | Performed by: NURSE PRACTITIONER

## 2023-08-17 PROCEDURE — 3078F DIAST BP <80 MM HG: CPT | Performed by: NURSE PRACTITIONER

## 2023-08-17 PROCEDURE — 3074F SYST BP LT 130 MM HG: CPT | Performed by: NURSE PRACTITIONER

## 2023-08-17 RX ORDER — METOPROLOL SUCCINATE 50 MG/1
50 TABLET, EXTENDED RELEASE ORAL DAILY
Qty: 30 TABLET | Refills: 3 | Status: SHIPPED | OUTPATIENT
Start: 2023-08-17 | End: 2023-08-17 | Stop reason: SDUPTHER

## 2023-08-17 RX ORDER — SPIRONOLACTONE 25 MG/1
25 TABLET ORAL DAILY
Qty: 30 TABLET | Refills: 3 | Status: SHIPPED | OUTPATIENT
Start: 2023-08-17 | End: 2023-08-17

## 2023-08-17 RX ORDER — SPIRONOLACTONE 25 MG/1
12.5 TABLET ORAL DAILY
Qty: 15 TABLET | Refills: 3 | Status: SHIPPED | OUTPATIENT
Start: 2023-08-17 | End: 2023-08-17 | Stop reason: ALTCHOICE

## 2023-08-17 RX ORDER — METOPROLOL SUCCINATE 25 MG/1
25 TABLET, EXTENDED RELEASE ORAL DAILY
Qty: 30 TABLET | Refills: 3 | Status: SHIPPED | OUTPATIENT
Start: 2023-08-17

## 2023-08-17 ASSESSMENT — ENCOUNTER SYMPTOMS
ABDOMINAL DISTENTION: 0
SHORTNESS OF BREATH: 0
COUGH: 0

## 2023-08-17 NOTE — PATIENT INSTRUCTIONS
You may receive a survey regarding the care you received during your visit. Your input is valuable to us. We encourage you to complete and return your survey. We hope you will choose us in the future for your healthcare needs. Your nurses today were B[place] and [employer]   Office hours:   Mon-Thurs 8-4:30  Friday 8-12  Phone: [phone]    Continue:  Continue current medications  Daily weights and record  Fluid restriction of 2 Liters per day  Limit sodium in diet to around 9103-5061 mg/day  Monitor BP  Activity as tolerated     Call the [address] for any of the following symptoms:   Weight gain of 2-3 pounds in 1 day or 5 pounds in 1 week  Increased shortness of breath  Shortness of breath while laying down  Cough  Chest pain  Swelling in feet, ankles or legs  Bloating in abdomen  Fatigue

## 2023-08-17 NOTE — PROGRESS NOTES
Heart Failure Clinic       Visit Date: 8/17/2023  Cardiologist:  Dr. Venecia Rodriges  Primary Care Physician: Dr. Bozena Hdez MD  Referred by: hospital    Divya Lugo is a 81 y.o. male who presents today for:  Chief Complaint   Patient presents with    Congestive Heart Failure     New Patient        HPI:   Divya Lugo is a 81 y.o. male who presents to the office for a NEW patient visit in the heart failure clinic. Accompanied by no one    TYPE HF: HFpEF 50%   Cause: no  Valves:  mild MR, TR  Device: no  HX: AFib (new 5/2023 - Eliquis), HTN, HLD    Dry Wt:  175    Hospitalization:  5/2023 - EDA over past month. New Afib s/p Unsuccessful DCCV. Cath negative, LVEDP 6 mmHg    Today: 172#  Feeling good overall. No fluid on exam  Much confusion on meds - apparently Eliquis and toprol got stopped errorenously d/t pharmacy changes. Pt also very confused if taking Aldactone. Patient has:  Chest Pain: no  SOB: HARDY  Orthopnea/PND: no  BRIANNE: no  Edema: better  Fatigue: no  Abdominal bloating: no  Cough: no  Appetite: good  Home weight: not check   Home blood pressure: not check    Past Medical History:   Diagnosis Date    Hyperlipidemia     Hypertension      No past surgical history on file. No family history on file.   Social History     Tobacco Use    Smoking status: Former    Smokeless tobacco: Not on file   Substance Use Topics    Alcohol use: Yes     Current Outpatient Medications   Medication Sig Dispense Refill    sacubitril-valsartan (ENTRESTO) 24-26 MG per tablet Take 1 tablet by mouth 2 times daily 60 tablet 3    metoprolol succinate (TOPROL XL) 50 MG extended release tablet Take 1 tablet by mouth daily 30 tablet 3    apixaban (ELIQUIS) 5 MG TABS tablet Take 1 tablet by mouth 2 times daily 60 tablet 3    atorvastatin (LIPITOR) 40 MG tablet Take 1 tablet by mouth daily      tamsulosin (FLOMAX) 0.4 MG capsule Take 1 capsule by mouth daily      dextromethorphan-guaiFENesin (MUCINEX DM)  MG
Depressed

## 2023-10-06 ENCOUNTER — OFFICE VISIT (OUTPATIENT)
Dept: CARDIOLOGY CLINIC | Age: 83
End: 2023-10-06
Payer: MEDICARE

## 2023-10-06 VITALS
HEIGHT: 72 IN | SYSTOLIC BLOOD PRESSURE: 130 MMHG | DIASTOLIC BLOOD PRESSURE: 78 MMHG | HEART RATE: 80 BPM | BODY MASS INDEX: 25.33 KG/M2 | WEIGHT: 187 LBS

## 2023-10-06 DIAGNOSIS — I48.91 ATRIAL FIBRILLATION, UNSPECIFIED TYPE (HCC): Primary | ICD-10-CM

## 2023-10-06 PROCEDURE — 99214 OFFICE O/P EST MOD 30 MIN: CPT | Performed by: INTERNAL MEDICINE

## 2023-10-06 PROCEDURE — 1123F ACP DISCUSS/DSCN MKR DOCD: CPT | Performed by: INTERNAL MEDICINE

## 2023-10-06 NOTE — PROGRESS NOTES
Pt here for 3 month check up     Pt is very hard of hearing. He is not taking Eliquis because \"it makes my blood too thin\".     Not taking Entresto d/t cost.

## 2023-10-06 NOTE — PROGRESS NOTES
[address] [address]  Dept: [phone]  Dept Fax: [phone]  Loc: [phone]    Visit Date: 10/6/2023    Mr. Deedee Heath is a 81 y.o. male  who presented for:  Chief Complaint   Patient presents with    Follow-up       HPI:   82 yo M, HFpEF, chronic Afib s/p [facility] is here for a follow up. Patient has chronic Afib but he never went back to . He denies any cardiac symtpoms. He was placed on eliquis but patient stopped taking         Current Outpatient Medications:     metoprolol succinate (TOPROL XL) 25 MG extended release tablet, Take 1 tablet by mouth daily, Disp: 30 tablet, Rfl: 3    atorvastatin (LIPITOR) 40 MG tablet, Take 1 tablet by mouth daily, Disp: , Rfl:     tamsulosin (FLOMAX) 0.4 MG capsule, Take 1 capsule by mouth daily, Disp: , Rfl:     acetaminophen (TYLENOL) 325 MG tablet, Take 2 tablets by mouth every 8 hours as needed for Pain, Disp: , Rfl:     apixaban (ELIQUIS) 5 MG TABS tablet, Take 1 tablet by mouth 2 times daily (Patient not taking: Reported on 10/6/2023), Disp: 60 tablet, Rfl: 3    Past Medical History  Victorino Gary  has a past medical history of Hyperlipidemia and Hypertension. Social History  Victorino Gary  reports that he has quit smoking. He does not have any smokeless tobacco history on file. He reports current alcohol use. He reports that he does not use drugs. Family History  Victorino Gary family history is not on file. Past Surgical History   No past surgical history on file. Subjective:     REVIEW OF SYSTEMS  Constitutional: denies sweats, chills and fever  HENT: denies  congestion, sinus pressure, sneezing and sore throat. Eyes: denies  pain, discharge, redness and itching. Respiratory: denies apnea, cough  Gastrointestinal: denies blood in stool, constipation, diarrhea   Endocrine: denies cold intolerance, heat intolerance, polydipsia.   Genitourinary: denies dysuria, enuresis,

## 2023-10-24 ENCOUNTER — TELEPHONE (OUTPATIENT)
Dept: CARDIOLOGY CLINIC | Age: 83
End: 2023-10-24

## 2023-10-24 NOTE — TELEPHONE ENCOUNTER
Contacted patient several times regarding overdue labs  Still no results  Letter mailed   10/24LM  10/4 LM  9/26 LM  9/20 LM

## 2023-11-29 ENCOUNTER — HOSPITAL ENCOUNTER (OUTPATIENT)
Age: 83
Discharge: HOME | End: 2023-11-29
Payer: MEDICARE

## 2023-11-29 DIAGNOSIS — I50.32 CHF NYHA CLASS II, CHRONIC, DIASTOLIC (HCC): ICD-10-CM

## 2023-11-29 LAB
ANION GAP SERPL CALC-SCNC: 9 MEQ/L (ref 8–16)
BUN SERPL-MCNC: 24 MG/DL (ref 7–22)
CALCIUM SERPL-MCNC: 9.3 MG/DL (ref 8.5–10.5)
CHLORIDE SERPL-SCNC: 104 MEQ/L (ref 98–111)
CO2 SERPL-SCNC: 26 MEQ/L (ref 23–33)
CREAT SERPL-MCNC: 1.2 MG/DL (ref 0.4–1.2)
GFR SERPL CREATININE-BSD FRML MDRD: 60 ML/MIN/1.73M2
GLUCOSE SERPL-MCNC: 97 MG/DL (ref 70–108)
POTASSIUM SERPL-SCNC: 4.5 MEQ/L (ref 3.5–5.2)
SODIUM SERPL-SCNC: 139 MEQ/L (ref 135–145)

## 2023-11-29 PROCEDURE — 80048 BASIC METABOLIC PNL TOTAL CA: CPT

## 2023-11-29 PROCEDURE — 36415 COLL VENOUS BLD VENIPUNCTURE: CPT

## 2023-11-30 ENCOUNTER — TELEPHONE (OUTPATIENT)
Dept: CARDIOLOGY CLINIC | Age: 83
End: 2023-11-30

## 2023-11-30 NOTE — TELEPHONE ENCOUNTER
----- Message from ANASTASIA Tovar CNP sent at 11/30/2023 12:08 PM EST -----  Labs look good - continue meds same.

## 2024-01-22 ENCOUNTER — HOSPITAL ENCOUNTER (EMERGENCY)
Age: 84
Discharge: HOME | End: 2024-01-22
Payer: MEDICARE

## 2024-01-22 VITALS
TEMPERATURE: 97.8 F | DIASTOLIC BLOOD PRESSURE: 79 MMHG | HEART RATE: 133 BPM | RESPIRATION RATE: 16 BRPM | OXYGEN SATURATION: 98 % | SYSTOLIC BLOOD PRESSURE: 115 MMHG

## 2024-01-22 DIAGNOSIS — J01.40 ACUTE NON-RECURRENT PANSINUSITIS: Primary | ICD-10-CM

## 2024-01-22 PROCEDURE — 99213 OFFICE O/P EST LOW 20 MIN: CPT

## 2024-01-22 RX ORDER — PREDNISONE 20 MG/1
20 TABLET ORAL DAILY
Qty: 5 TABLET | Refills: 0 | Status: SHIPPED | OUTPATIENT
Start: 2024-01-22 | End: 2024-01-27

## 2024-01-22 RX ORDER — AMOXICILLIN AND CLAVULANATE POTASSIUM 875; 125 MG/1; MG/1
1 TABLET, FILM COATED ORAL 2 TIMES DAILY
Qty: 20 TABLET | Refills: 0 | Status: SHIPPED | OUTPATIENT
Start: 2024-01-22 | End: 2024-02-01

## 2024-01-22 ASSESSMENT — ENCOUNTER SYMPTOMS
SINUS PAIN: 1
SINUS PRESSURE: 1
RHINORRHEA: 1
VOMITING: 0
EYE DISCHARGE: 0
SORE THROAT: 1
COUGH: 1
NAUSEA: 0
DIARRHEA: 0
TROUBLE SWALLOWING: 1
EYE REDNESS: 0

## 2024-01-22 NOTE — ED NOTES
To [facility] with complaints of jaw pain, gum pain, hard to swallow, and ethan ear pain. Started around 2-3 weeks ago     Cherie Ferreira RN  01/22/24 [id number]

## 2024-01-22 NOTE — DISCHARGE INSTRUCTIONS
Prescription for Augmentin.  Use over the counter  Zyrtec, Flonase, and Mucinex.   Drink plenty of fluids to thin mucus.  Sleep with your head propped up on a pillow.  Inhale steam three of four times daily( exp: sit in bathroom with hot shower running.)  Avoid Trigger and not smoking.  May also clean nasal passages with salt water to ease congestion. Use over-the-counter Tylenol for pain or fever.  Prescribed Prednisone for pain and inflammation. Follow-up with their PCP in 3 to 5 days and worsening symptoms

## 2024-01-22 NOTE — ED PROVIDER NOTES
CONTINUE these medications which have NOT CHANGED    Details   metoprolol succinate (TOPROL XL) 25 MG extended release tablet TAKE 1 TABLET BY MOUTH EVERY DAY, Disp-90 tablet, R-3Normal      apixaban (ELIQUIS) 5 MG TABS tablet Take 1 tablet by mouth 2 times daily, Disp-60 tablet, R-3Normal      atorvastatin (LIPITOR) 40 MG tablet Take 1 tablet by mouth dailyHistorical Med      tamsulosin (FLOMAX) 0.4 MG capsule Take 1 capsule by mouth dailyHistorical Med      acetaminophen (TYLENOL) 325 MG tablet Take 2 tablets by mouth every 8 hours as needed for PainHistorical Med             ALLERGIES     Patient is is allergic to coricidin hbp [dm-apap-cpm] and sulfa antibiotics.    FAMILY HISTORY     Patient'sfamily history is not on file.    SOCIAL HISTORY     Patient  reports that he has quit smoking. He does not have any smokeless tobacco history on file. He reports current alcohol use. He reports that he does not use drugs.    PHYSICAL EXAM     ED TRIAGE VITALS  BP: 115/79, Temp: 97.8 °F (36.6 °C), Pulse: (!) 133, Respirations: 16, SpO2: 98 %  Physical Exam  Vitals and nursing note reviewed.   Constitutional:       General: He is not in acute distress.     Appearance: Normal appearance. He is well-developed. He is not ill-appearing, toxic-appearing or diaphoretic.   HENT:      Head: Normocephalic and atraumatic.      Jaw: No trismus.      Right Ear: Hearing, tympanic membrane, ear canal and external ear normal. No mastoid tenderness. No hemotympanum. Tympanic membrane is not perforated, erythematous or bulging.      Left Ear: Hearing, tympanic membrane, ear canal and external ear normal. No mastoid tenderness. No hemotympanum. Tympanic membrane is not perforated, erythematous or bulging.      Nose: Congestion and rhinorrhea present.      Right Turbinates: Swollen.      Left Turbinates: Swollen.      Right Sinus: Maxillary sinus tenderness and frontal sinus tenderness present.      Left Sinus: Maxillary sinus tenderness    TempSrc: Oral   SpO2: 98%       Medications - No data to display  PROCEDURES:  None  FINAL IMPRESSION      1. Acute non-recurrent pansinusitis        DISPOSITION/PLAN   DISPOSITION Decision To Discharge 01/22/2024 03:28:52 PM      Patient seen and evaluated for sinus pressure.  Assessment consistent with Acute Pansinusitis.  Patient is provided a prescription for Augmentin.  Instructed to use Zyrtec, Flonase, and Mucinex D.   Drink plenty of fluids to thin mucus.  Sleep with your head propped up on a pillow.  Inhale steam three of four times daily( exp: sit in bathroom with hot shower running.)  Avoid Trigger and not smoking.  May also clean nasal passages with salt water to ease congestion. The Patient is instructed to use over-the-counter Tylenol and Motrin for pain or fever.  Instructed to follow-up with their PCP or [facility] in 3 to 5 days and worsening symptoms.  The patient is agreeable with the above plan and denies questions or concerns at this time.    PATIENT REFERRED TO:  Mao Spencer MD  [address] [address] [phone]      As needed, If symptoms worsen    DISCHARGE MEDICATIONS:  Discharge Medication List as of 1/22/2024  3:31 PM        START taking these medications    Details   amoxicillin-clavulanate (AUGMENTIN) 875-125 MG per tablet Take 1 tablet by mouth 2 times daily for 10 days, Disp-20 tablet, R-0Normal      predniSONE (DELTASONE) 20 MG tablet Take 1 tablet by mouth daily for 5 days, Disp-5 tablet, R-0Normal           Discharge Medication List as of 1/22/2024  3:31 PM          ANASTASIA Mueller CNP, Olivia, APRN - CNP  01/22/24 [id number]

## 2024-02-02 ENCOUNTER — TELEPHONE (OUTPATIENT)
Dept: CARDIOLOGY CLINIC | Age: 84
End: 2024-02-02

## 2024-02-02 ENCOUNTER — HOSPITAL ENCOUNTER (INPATIENT)
Age: 84
LOS: 6 days | Discharge: HOME | DRG: 308 | End: 2024-02-08
Attending: EMERGENCY MEDICINE | Admitting: PHYSICIAN ASSISTANT
Payer: MEDICARE

## 2024-02-02 ENCOUNTER — APPOINTMENT (OUTPATIENT)
Dept: GENERAL RADIOLOGY | Age: 84
DRG: 308 | End: 2024-02-02
Payer: MEDICARE

## 2024-02-02 DIAGNOSIS — I48.91 NEW ONSET ATRIAL FIBRILLATION (HCC): ICD-10-CM

## 2024-02-02 DIAGNOSIS — I50.9 DECOMPENSATED HEART FAILURE (HCC): ICD-10-CM

## 2024-02-02 DIAGNOSIS — I48.91 ATRIAL FIBRILLATION WITH RAPID VENTRICULAR RESPONSE (HCC): Primary | ICD-10-CM

## 2024-02-02 DIAGNOSIS — R74.01 TRANSAMINITIS: ICD-10-CM

## 2024-02-02 DIAGNOSIS — E03.9 HYPOTHYROIDISM, UNSPECIFIED TYPE: ICD-10-CM

## 2024-02-02 DIAGNOSIS — I50.31 ACUTE DIASTOLIC CONGESTIVE HEART FAILURE (HCC): ICD-10-CM

## 2024-02-02 LAB
ALBUMIN SERPL BCG-MCNC: 3.3 G/DL (ref 3.5–5.1)
ALP SERPL-CCNC: 282 U/L (ref 38–126)
ALT SERPL W/O P-5'-P-CCNC: 205 U/L (ref 11–66)
ANION GAP SERPL CALC-SCNC: 14 MEQ/L (ref 8–16)
APTT PPP: 27.9 SECONDS (ref 22–38)
AST SERPL-CCNC: 68 U/L (ref 5–40)
BASE EXCESS BLDA CALC-SCNC: -3.4 MMOL/L (ref -2.5–2.5)
BASOPHILS ABSOLUTE: 0 THOU/MM3 (ref 0–0.1)
BASOPHILS NFR BLD AUTO: 0.2 %
BDY SITE: ABNORMAL
BILIRUB CONJ SERPL-MCNC: 0.4 MG/DL (ref 0–0.3)
BILIRUB SERPL-MCNC: 0.8 MG/DL (ref 0.3–1.2)
BILIRUB UR QL STRIP.AUTO: NEGATIVE
BUN SERPL-MCNC: 36 MG/DL (ref 7–22)
CALCIUM SERPL-MCNC: 9 MG/DL (ref 8.5–10.5)
CHARACTER UR: CLEAR
CHLORIDE SERPL-SCNC: 103 MEQ/L (ref 98–111)
CO2 SERPL-SCNC: 21 MEQ/L (ref 23–33)
COLLECTED BY:: ABNORMAL
COLOR: YELLOW
CREAT SERPL-MCNC: 1.3 MG/DL (ref 0.4–1.2)
DEPRECATED RDW RBC AUTO: 53.5 FL (ref 35–45)
EOSINOPHIL NFR BLD AUTO: 1.5 %
EOSINOPHILS ABSOLUTE: 0.1 THOU/MM3 (ref 0–0.4)
ERYTHROCYTE [DISTWIDTH] IN BLOOD BY AUTOMATED COUNT: 15.8 % (ref 11.5–14.5)
FLUAV RNA RESP QL NAA+PROBE: NOT DETECTED
FLUBV RNA RESP QL NAA+PROBE: NOT DETECTED
GFR SERPL CREATININE-BSD FRML MDRD: 55 ML/MIN/1.73M2
GLUCOSE SERPL-MCNC: 123 MG/DL (ref 70–108)
GLUCOSE UR QL STRIP.AUTO: NEGATIVE MG/DL
HCO3 BLDA-SCNC: 20 MMOL/L (ref 23–28)
HCT VFR BLD AUTO: 38.8 % (ref 42–52)
HEPARIN UNFRACTIONATED: < 0.04 U/ML (ref 0.3–0.7)
HGB BLD-MCNC: 12.3 GM/DL (ref 14–18)
HGB UR QL STRIP.AUTO: NEGATIVE
IMM GRANULOCYTES # BLD AUTO: 0.04 THOU/MM3 (ref 0–0.07)
IMM GRANULOCYTES NFR BLD AUTO: 0.4 %
INR PPP: 1.19 (ref 0.85–1.13)
KETONES UR QL STRIP.AUTO: NEGATIVE
LYMPHOCYTES ABSOLUTE: 1.1 THOU/MM3 (ref 1–4.8)
LYMPHOCYTES NFR BLD AUTO: 11.9 %
MAGNESIUM SERPL-MCNC: 2 MG/DL (ref 1.6–2.4)
MCH RBC QN AUTO: 29.5 PG (ref 26–33)
MCHC RBC AUTO-ENTMCNC: 31.7 GM/DL (ref 32.2–35.5)
MCV RBC AUTO: 93 FL (ref 80–94)
MONOCYTES ABSOLUTE: 1.4 THOU/MM3 (ref 0.4–1.3)
MONOCYTES NFR BLD AUTO: 15.2 %
NEUTROPHILS NFR BLD AUTO: 70.8 %
NITRITE UR QL STRIP: NEGATIVE
NRBC BLD AUTO-RTO: 1 /100 WBC
NT-PROBNP SERPL IA-MCNC: 4102 PG/ML (ref 0–449)
OSMOLALITY SERPL CALC.SUM OF ELEC: 285.4 MOSMOL/KG (ref 275–300)
PCO2 BLDA: 31 MMHG (ref 35–45)
PH BLDA: 7.42 [PH] (ref 7.35–7.45)
PH UR STRIP.AUTO: 5.5 [PH] (ref 5–9)
PLATELET # BLD AUTO: 236 THOU/MM3 (ref 130–400)
PMV BLD AUTO: 10.6 FL (ref 9.4–12.4)
PO2 BLDA: 84 MMHG (ref 71–104)
POTASSIUM SERPL-SCNC: 4.1 MEQ/L (ref 3.5–5.2)
PROCALCITONIN SERPL IA-MCNC: 0.11 NG/ML (ref 0.01–0.09)
PROT SERPL-MCNC: 6.5 G/DL (ref 6.1–8)
PROT UR STRIP.AUTO-MCNC: NEGATIVE MG/DL
RBC # BLD AUTO: 4.17 MILL/MM3 (ref 4.7–6.1)
SAO2 % BLDA: 97 %
SARS-COV-2 RNA RESP QL NAA+PROBE: NOT DETECTED
SEGMENTED NEUTROPHILS ABSOLUTE COUNT: 6.4 THOU/MM3 (ref 1.8–7.7)
SODIUM SERPL-SCNC: 138 MEQ/L (ref 135–145)
SP GR UR REFRACT.AUTO: 1.01 (ref 1–1.03)
T4 FREE SERPL-MCNC: 0.77 NG/DL (ref 0.93–1.76)
TROPONIN, HIGH SENSITIVITY: 32 NG/L (ref 0–12)
TSH SERPL DL<=0.005 MIU/L-ACNC: 7.35 UIU/ML (ref 0.4–4.2)
UROBILINOGEN, URINE: 0.2 EU/DL (ref 0–1)
WBC # BLD AUTO: 9.1 THOU/MM3 (ref 4.8–10.8)
WBC #/AREA URNS HPF: NEGATIVE /[HPF]

## 2024-02-02 PROCEDURE — 87040 BLOOD CULTURE FOR BACTERIA: CPT

## 2024-02-02 PROCEDURE — 85730 THROMBOPLASTIN TIME PARTIAL: CPT

## 2024-02-02 PROCEDURE — 2500000003 HC RX 250 WO HCPCS: Performed by: EMERGENCY MEDICINE

## 2024-02-02 PROCEDURE — 83735 ASSAY OF MAGNESIUM: CPT

## 2024-02-02 PROCEDURE — 6360000002 HC RX W HCPCS

## 2024-02-02 PROCEDURE — 85610 PROTHROMBIN TIME: CPT

## 2024-02-02 PROCEDURE — 96365 THER/PROPH/DIAG IV INF INIT: CPT

## 2024-02-02 PROCEDURE — 83880 ASSAY OF NATRIURETIC PEPTIDE: CPT

## 2024-02-02 PROCEDURE — 36415 COLL VENOUS BLD VENIPUNCTURE: CPT

## 2024-02-02 PROCEDURE — 93005 ELECTROCARDIOGRAM TRACING: CPT | Performed by: EMERGENCY MEDICINE

## 2024-02-02 PROCEDURE — 84145 PROCALCITONIN (PCT): CPT

## 2024-02-02 PROCEDURE — 2580000003 HC RX 258: Performed by: EMERGENCY MEDICINE

## 2024-02-02 PROCEDURE — 82248 BILIRUBIN DIRECT: CPT

## 2024-02-02 PROCEDURE — 84484 ASSAY OF TROPONIN QUANT: CPT

## 2024-02-02 PROCEDURE — 87636 SARSCOV2 & INF A&B AMP PRB: CPT

## 2024-02-02 PROCEDURE — 99223 1ST HOSP IP/OBS HIGH 75: CPT | Performed by: PHYSICIAN ASSISTANT

## 2024-02-02 PROCEDURE — 36600 WITHDRAWAL OF ARTERIAL BLOOD: CPT

## 2024-02-02 PROCEDURE — 82803 BLOOD GASES ANY COMBINATION: CPT

## 2024-02-02 PROCEDURE — 85025 COMPLETE CBC W/AUTO DIFF WBC: CPT

## 2024-02-02 PROCEDURE — 96366 THER/PROPH/DIAG IV INF ADDON: CPT

## 2024-02-02 PROCEDURE — 99285 EMERGENCY DEPT VISIT HI MDM: CPT

## 2024-02-02 PROCEDURE — 71045 X-RAY EXAM CHEST 1 VIEW: CPT

## 2024-02-02 PROCEDURE — 84443 ASSAY THYROID STIM HORMONE: CPT

## 2024-02-02 PROCEDURE — 1200000003 HC TELEMETRY R&B

## 2024-02-02 PROCEDURE — 80053 COMPREHEN METABOLIC PANEL: CPT

## 2024-02-02 PROCEDURE — 81003 URINALYSIS AUTO W/O SCOPE: CPT

## 2024-02-02 PROCEDURE — 84439 ASSAY OF FREE THYROXINE: CPT

## 2024-02-02 PROCEDURE — 6360000002 HC RX W HCPCS: Performed by: EMERGENCY MEDICINE

## 2024-02-02 PROCEDURE — 96375 TX/PRO/DX INJ NEW DRUG ADDON: CPT

## 2024-02-02 PROCEDURE — 85520 HEPARIN ASSAY: CPT

## 2024-02-02 RX ORDER — ONDANSETRON 2 MG/ML
4 INJECTION INTRAMUSCULAR; INTRAVENOUS EVERY 6 HOURS PRN
Status: DISCONTINUED | OUTPATIENT
Start: 2024-02-02 | End: 2024-02-08 | Stop reason: HOSPADM

## 2024-02-02 RX ORDER — ACETAMINOPHEN 325 MG/1
650 TABLET ORAL EVERY 6 HOURS PRN
Status: DISCONTINUED | OUTPATIENT
Start: 2024-02-02 | End: 2024-02-08 | Stop reason: HOSPADM

## 2024-02-02 RX ORDER — DILTIAZEM HYDROCHLORIDE 5 MG/ML
10 INJECTION INTRAVENOUS ONCE
Status: COMPLETED | OUTPATIENT
Start: 2024-02-02 | End: 2024-02-02

## 2024-02-02 RX ORDER — BUMETANIDE 0.25 MG/ML
2 INJECTION INTRAMUSCULAR; INTRAVENOUS ONCE
Status: COMPLETED | OUTPATIENT
Start: 2024-02-02 | End: 2024-02-02

## 2024-02-02 RX ORDER — SODIUM CHLORIDE 9 MG/ML
INJECTION, SOLUTION INTRAVENOUS PRN
Status: DISCONTINUED | OUTPATIENT
Start: 2024-02-02 | End: 2024-02-08 | Stop reason: HOSPADM

## 2024-02-02 RX ORDER — TAMSULOSIN HYDROCHLORIDE 0.4 MG/1
0.4 CAPSULE ORAL DAILY
Status: DISCONTINUED | OUTPATIENT
Start: 2024-02-03 | End: 2024-02-08 | Stop reason: HOSPADM

## 2024-02-02 RX ORDER — POLYETHYLENE GLYCOL 3350 17 G/17G
17 POWDER, FOR SOLUTION ORAL DAILY PRN
Status: DISCONTINUED | OUTPATIENT
Start: 2024-02-02 | End: 2024-02-08 | Stop reason: HOSPADM

## 2024-02-02 RX ORDER — SODIUM CHLORIDE 0.9 % (FLUSH) 0.9 %
5-40 SYRINGE (ML) INJECTION EVERY 12 HOURS SCHEDULED
Status: DISCONTINUED | OUTPATIENT
Start: 2024-02-02 | End: 2024-02-08 | Stop reason: HOSPADM

## 2024-02-02 RX ORDER — POTASSIUM CHLORIDE 20 MEQ/1
40 TABLET, EXTENDED RELEASE ORAL PRN
Status: DISCONTINUED | OUTPATIENT
Start: 2024-02-02 | End: 2024-02-08 | Stop reason: HOSPADM

## 2024-02-02 RX ORDER — ONDANSETRON 4 MG/1
4 TABLET, ORALLY DISINTEGRATING ORAL EVERY 8 HOURS PRN
Status: DISCONTINUED | OUTPATIENT
Start: 2024-02-02 | End: 2024-02-08 | Stop reason: HOSPADM

## 2024-02-02 RX ORDER — HEPARIN SODIUM 1000 [USP'U]/ML
4000 INJECTION, SOLUTION INTRAVENOUS; SUBCUTANEOUS ONCE
Status: COMPLETED | OUTPATIENT
Start: 2024-02-02 | End: 2024-02-02

## 2024-02-02 RX ORDER — POTASSIUM CHLORIDE 7.45 MG/ML
10 INJECTION INTRAVENOUS PRN
Status: DISCONTINUED | OUTPATIENT
Start: 2024-02-02 | End: 2024-02-08 | Stop reason: HOSPADM

## 2024-02-02 RX ORDER — ATORVASTATIN CALCIUM 40 MG/1
40 TABLET, FILM COATED ORAL DAILY
Status: DISCONTINUED | OUTPATIENT
Start: 2024-02-03 | End: 2024-02-03

## 2024-02-02 RX ORDER — ACETAMINOPHEN 650 MG/1
650 SUPPOSITORY RECTAL EVERY 6 HOURS PRN
Status: DISCONTINUED | OUTPATIENT
Start: 2024-02-02 | End: 2024-02-08 | Stop reason: HOSPADM

## 2024-02-02 RX ORDER — AMOXICILLIN AND CLAVULANATE POTASSIUM 875; 125 MG/1; MG/1
1 TABLET, FILM COATED ORAL EVERY 12 HOURS SCHEDULED
Status: COMPLETED | OUTPATIENT
Start: 2024-02-02 | End: 2024-02-05

## 2024-02-02 RX ORDER — HEPARIN SODIUM 1000 [USP'U]/ML
4000 INJECTION, SOLUTION INTRAVENOUS; SUBCUTANEOUS PRN
Status: DISCONTINUED | OUTPATIENT
Start: 2024-02-02 | End: 2024-02-06

## 2024-02-02 RX ORDER — SODIUM CHLORIDE 0.9 % (FLUSH) 0.9 %
5-40 SYRINGE (ML) INJECTION PRN
Status: DISCONTINUED | OUTPATIENT
Start: 2024-02-02 | End: 2024-02-08 | Stop reason: HOSPADM

## 2024-02-02 RX ORDER — BUMETANIDE 0.25 MG/ML
1 INJECTION INTRAMUSCULAR; INTRAVENOUS ONCE
Status: COMPLETED | OUTPATIENT
Start: 2024-02-03 | End: 2024-02-03

## 2024-02-02 RX ORDER — HEPARIN SODIUM 10000 [USP'U]/100ML
5-30 INJECTION, SOLUTION INTRAVENOUS CONTINUOUS
Status: DISCONTINUED | OUTPATIENT
Start: 2024-02-02 | End: 2024-02-06

## 2024-02-02 RX ORDER — MAGNESIUM SULFATE IN WATER 40 MG/ML
2000 INJECTION, SOLUTION INTRAVENOUS PRN
Status: DISCONTINUED | OUTPATIENT
Start: 2024-02-02 | End: 2024-02-08 | Stop reason: HOSPADM

## 2024-02-02 RX ORDER — METOPROLOL SUCCINATE 50 MG/1
50 TABLET, EXTENDED RELEASE ORAL DAILY
Status: DISCONTINUED | OUTPATIENT
Start: 2024-02-03 | End: 2024-02-04

## 2024-02-02 RX ORDER — HEPARIN SODIUM 1000 [USP'U]/ML
2000 INJECTION, SOLUTION INTRAVENOUS; SUBCUTANEOUS PRN
Status: DISCONTINUED | OUTPATIENT
Start: 2024-02-02 | End: 2024-02-06

## 2024-02-02 RX ORDER — LEVOTHYROXINE SODIUM 0.03 MG/1
25 TABLET ORAL DAILY
Status: DISCONTINUED | OUTPATIENT
Start: 2024-02-03 | End: 2024-02-08 | Stop reason: HOSPADM

## 2024-02-02 RX ADMIN — DILTIAZEM HYDROCHLORIDE 5 MG/HR: 5 INJECTION INTRAVENOUS at 15:02

## 2024-02-02 RX ADMIN — HEPARIN SODIUM 11 UNITS/KG/HR: 10000 INJECTION, SOLUTION INTRAVENOUS at 17:56

## 2024-02-02 RX ADMIN — HEPARIN SODIUM 4000 UNITS: 1000 INJECTION INTRAVENOUS; SUBCUTANEOUS at 17:51

## 2024-02-02 RX ADMIN — DILTIAZEM HYDROCHLORIDE 10 MG: 5 INJECTION INTRAVENOUS at 14:50

## 2024-02-02 RX ADMIN — BUMETANIDE 2 MG: 0.25 INJECTION INTRAMUSCULAR; INTRAVENOUS at 14:48

## 2024-02-02 ASSESSMENT — PAIN - FUNCTIONAL ASSESSMENT
PAIN_FUNCTIONAL_ASSESSMENT: NONE - DENIES PAIN

## 2024-02-02 NOTE — ED NOTES
Pt laying in bed just getting back from the bathroom. Pt denies being in any pain. Updated about care plan and treatment. Pt has no further concerns at this moment. Family at bedside. Call light within reach.

## 2024-02-02 NOTE — ED PROVIDER NOTES
[facility] EMERGENCY DEPARTMENT    EMERGENCY MEDICINE     Patient Name: Tato Cannon  MRN: [id number]  YOB: 1940  Date of Evaluation: 2/2/2024  Treating Resident Physician: Mina Mcclure DO  Supervising Physician: Dr. Claus Gaines MD    CHIEF COMPLAINT       Chief Complaint   Patient presents with    Leg Swelling    Shortness of Breath       HISTORY OF PRESENT ILLNESS    HPI    History obtained from the patient, chart review, and family at bedside.    Tato Cannon is a 83 y.o. male who presents to the emergency department from home by private vehicle for evaluation of lower extremity swelling and progressive dyspnea on exertion.  PMH: Paroxysmal atrial fibrillation [id number] (on Toprol, not currently on anticoagulation), HFpEF (GDMT Toprol), HLD, HTN, BPH this began approximately month ago and is been constant and steadily worsening.  Patient now can go a few steps without becoming short of breath. +Orthopnea and not getting much sleep at night.  Denies fevers, chills, chest pain, palpitations, abdominal pain, nausea, vomiting, diarrhea.  Does have increased urinary frequency and difficulty starting but this is chronic and takes Flomax.    As patient on anticoagulation, stated he has not take this in a while.  Was initially started on it last year but stated he had trouble with bleeding from small cuts.  Denies GI bleeding.    Pertinent previous and/or external records reviewed    REVIEW OF SYSTEMS   Review of Systems  Negative unless documented in HPI    PAST MEDICAL AND SURGICAL HISTORY     Past Medical History:   Diagnosis Date    Hyperlipidemia     Hypertension        History reviewed. No pertinent surgical history.    CURRENT MEDICATIONS     Previous Medications    ACETAMINOPHEN (TYLENOL) 325 MG TABLET    Take 2 tablets by mouth every 8 hours as needed for Pain    APIXABAN (ELIQUIS) 5 MG TABS TABLET    Take 1 tablet by mouth 2 times daily    ATORVASTATIN (LIPITOR) 40 MG TABLET 
occasionally words aremis-transcribed.)    MD Eder [place], MD Claus  02/02/24 2206

## 2024-02-02 NOTE — ED NOTES
Pt. Resting in bed with even and unlabored respirations. Pt. Denies any pain. Pt ambulatory to the bathroom with RN. Pt. Updated about plan of care and treatment. Pt. Has no further concerns, questions or needs at this time. Call light within reach.

## 2024-02-02 NOTE — ED NOTES
Pt. Arrives to the ED for sob and bilateral lower leg swelling that began about a month ago. Pt has not been seen for these issues since they started. Pt states he has had this issue in the past however over a year ago. Pt states he was on a water pill but was taken off  it several months ago. Pt states he has had drainage from his lower legs. Pt lower legs are not red or warm to the touch. Pt denies any pain including chest pain. Pt denies any fever or cough. Pt respirations are unlabored. VSS

## 2024-02-02 NOTE — TELEPHONE ENCOUNTER
FYI:  Patient son LMOM they are taking patient to ER for leg swelling, feeling full of fluid up into the thighs and abodmen. Has appt 2/12  Will follow

## 2024-02-02 NOTE — ED NOTES
Pt laying in bed talking with family. Pt updated about medications being given and treatment plan. Pt given meds per MAR. Pt denies pain at this time. Pt has no further concerns at this time. Family at bedside. Call light within reach.

## 2024-02-03 ENCOUNTER — APPOINTMENT (OUTPATIENT)
Age: 84
DRG: 308 | End: 2024-02-03
Attending: PHYSICIAN ASSISTANT
Payer: MEDICARE

## 2024-02-03 PROBLEM — I50.31 ACUTE DIASTOLIC CONGESTIVE HEART FAILURE (HCC): Status: ACTIVE | Noted: 2024-02-03

## 2024-02-03 LAB
ALBUMIN SERPL BCG-MCNC: 2.9 G/DL (ref 3.5–5.1)
ALP SERPL-CCNC: 250 U/L (ref 38–126)
ALT SERPL W/O P-5'-P-CCNC: 162 U/L (ref 11–66)
ANION GAP SERPL CALC-SCNC: 13 MEQ/L (ref 8–16)
AST SERPL-CCNC: 53 U/L (ref 5–40)
BASOPHILS ABSOLUTE: 0 THOU/MM3 (ref 0–0.1)
BASOPHILS NFR BLD AUTO: 0.3 %
BILIRUB CONJ SERPL-MCNC: 0.5 MG/DL (ref 0–0.3)
BILIRUB SERPL-MCNC: 0.9 MG/DL (ref 0.3–1.2)
BUN SERPL-MCNC: 31 MG/DL (ref 7–22)
CALCIUM SERPL-MCNC: 8.6 MG/DL (ref 8.5–10.5)
CHLORIDE SERPL-SCNC: 105 MEQ/L (ref 98–111)
CO2 SERPL-SCNC: 22 MEQ/L (ref 23–33)
CREAT SERPL-MCNC: 1.1 MG/DL (ref 0.4–1.2)
DEPRECATED RDW RBC AUTO: 59.1 FL (ref 35–45)
ECHO AO ASC DIAM: 3.4 CM
ECHO AO ASCENDING AORTA INDEX: 1.62 CM/M2
ECHO AO SINUS VALSALVA DIAM: 2.9 CM
ECHO AO SINUS VALSALVA INDEX: 1.38 CM/M2
ECHO AO ST JNCT DIAM: 2.2 CM
ECHO AV CUSP MM: 1.4 CM
ECHO AV MEAN GRADIENT: 5 MMHG
ECHO AV MEAN VELOCITY: 1.1 M/S
ECHO AV PEAK GRADIENT: 8 MMHG
ECHO AV PEAK VELOCITY: 1.4 M/S
ECHO AV VELOCITY RATIO: 0.57
ECHO AV VTI: 28.8 CM
ECHO BSA: 2.11 M2
ECHO EST RA PRESSURE: 15 MMHG
ECHO IVC PROX: 2.7 CM
ECHO LA AREA 2C: 19.4 CM2
ECHO LA AREA 4C: 18.5 CM2
ECHO LA DIAMETER INDEX: 1.95 CM/M2
ECHO LA DIAMETER: 4.1 CM
ECHO LA MAJOR AXIS: 5.6 CM
ECHO LA MINOR AXIS: 6.1 CM
ECHO LA VOL BP: 52 ML (ref 18–58)
ECHO LA VOL MOD A2C: 50 ML (ref 18–58)
ECHO LA VOL MOD A4C: 51 ML (ref 18–58)
ECHO LA VOL/BSA BIPLANE: 25 ML/M2 (ref 16–34)
ECHO LA VOLUME INDEX MOD A2C: 24 ML/M2 (ref 16–34)
ECHO LA VOLUME INDEX MOD A4C: 24 ML/M2 (ref 16–34)
ECHO LV EDV A4C: 135 ML
ECHO LV EDV INDEX A4C: 64 ML/M2
ECHO LV EJECTION FRACTION A4C: 44 %
ECHO LV ESV A4C: 76 ML
ECHO LV ESV INDEX A4C: 36 ML/M2
ECHO LV FRACTIONAL SHORTENING: 20 % (ref 28–44)
ECHO LV INTERNAL DIMENSION DIASTOLE INDEX: 2.33 CM/M2
ECHO LV INTERNAL DIMENSION DIASTOLIC: 4.9 CM (ref 4.2–5.9)
ECHO LV INTERNAL DIMENSION SYSTOLIC INDEX: 1.86 CM/M2
ECHO LV INTERNAL DIMENSION SYSTOLIC: 3.9 CM
ECHO LV ISOVOLUMETRIC RELAXATION TIME (IVRT): 70 MS
ECHO LV IVSD: 1 CM (ref 0.6–1)
ECHO LV MASS 2D: 176 G (ref 88–224)
ECHO LV MASS INDEX 2D: 83.8 G/M2 (ref 49–115)
ECHO LV POSTERIOR WALL DIASTOLIC: 1 CM (ref 0.6–1)
ECHO LV RELATIVE WALL THICKNESS RATIO: 0.41
ECHO LVOT AV VTI INDEX: 0.45
ECHO LVOT MEAN GRADIENT: 1 MMHG
ECHO LVOT PEAK GRADIENT: 2 MMHG
ECHO LVOT PEAK VELOCITY: 0.8 M/S
ECHO LVOT VTI: 13.1 CM
ECHO MV E DECELERATION TIME (DT): 161 MS
ECHO MV E VELOCITY: 0.73 M/S
ECHO MV REGURGITANT PEAK GRADIENT: 58 MMHG
ECHO MV REGURGITANT PEAK VELOCITY: 3.8 M/S
ECHO PV MAX VELOCITY: 0.5 M/S
ECHO PV PEAK GRADIENT: 1 MMHG
ECHO RIGHT VENTRICULAR SYSTOLIC PRESSURE (RVSP): 32 MMHG
ECHO RV INTERNAL DIMENSION: 3.4 CM
ECHO RV TAPSE: 1 CM (ref 1.7–?)
ECHO TV E WAVE: 0.6 M/S
ECHO TV REGURGITANT MAX VELOCITY: 2.07 M/S
ECHO TV REGURGITANT PEAK GRADIENT: 17 MMHG
EKG Q-T INTERVAL: 308 MS
EKG QRS DURATION: 92 MS
EKG QTC CALCULATION (BAZETT): 494 MS
EKG R AXIS: -52 DEGREES
EKG T AXIS: 52 DEGREES
EKG VENTRICULAR RATE: 155 BPM
EOSINOPHIL NFR BLD AUTO: 1.7 %
EOSINOPHILS ABSOLUTE: 0.1 THOU/MM3 (ref 0–0.4)
ERYTHROCYTE [DISTWIDTH] IN BLOOD BY AUTOMATED COUNT: 15.9 % (ref 11.5–14.5)
GFR SERPL CREATININE-BSD FRML MDRD: > 60 ML/MIN/1.73M2
GLUCOSE SERPL-MCNC: 122 MG/DL (ref 70–108)
HCT VFR BLD AUTO: 40.3 % (ref 42–52)
HEPARIN UNFRACTIONATED: 0.34 U/ML (ref 0.3–0.7)
HEPARIN UNFRACTIONATED: 0.45 U/ML (ref 0.3–0.7)
HGB BLD-MCNC: 11.8 GM/DL (ref 14–18)
IMM GRANULOCYTES # BLD AUTO: 0.03 THOU/MM3 (ref 0–0.07)
IMM GRANULOCYTES NFR BLD AUTO: 0.4 %
LYMPHOCYTES ABSOLUTE: 0.9 THOU/MM3 (ref 1–4.8)
LYMPHOCYTES NFR BLD AUTO: 11.7 %
MCH RBC QN AUTO: 29.5 PG (ref 26–33)
MCHC RBC AUTO-ENTMCNC: 29.3 GM/DL (ref 32.2–35.5)
MCV RBC AUTO: 100.8 FL (ref 80–94)
MONOCYTES ABSOLUTE: 1.4 THOU/MM3 (ref 0.4–1.3)
MONOCYTES NFR BLD AUTO: 17.2 %
NEUTROPHILS NFR BLD AUTO: 68.7 %
NRBC BLD AUTO-RTO: 0 /100 WBC
PLATELET # BLD AUTO: 204 THOU/MM3 (ref 130–400)
PMV BLD AUTO: 10.5 FL (ref 9.4–12.4)
POTASSIUM SERPL-SCNC: 3.7 MEQ/L (ref 3.5–5.2)
PROT SERPL-MCNC: 6.1 G/DL (ref 6.1–8)
RBC # BLD AUTO: 4 MILL/MM3 (ref 4.7–6.1)
SEGMENTED NEUTROPHILS ABSOLUTE COUNT: 5.4 THOU/MM3 (ref 1.8–7.7)
SODIUM SERPL-SCNC: 140 MEQ/L (ref 135–145)
WBC # BLD AUTO: 7.9 THOU/MM3 (ref 4.8–10.8)

## 2024-02-03 PROCEDURE — 6360000002 HC RX W HCPCS: Performed by: INTERNAL MEDICINE

## 2024-02-03 PROCEDURE — 6370000000 HC RX 637 (ALT 250 FOR IP): Performed by: PHYSICIAN ASSISTANT

## 2024-02-03 PROCEDURE — 85025 COMPLETE CBC W/AUTO DIFF WBC: CPT

## 2024-02-03 PROCEDURE — 2500000003 HC RX 250 WO HCPCS: Performed by: EMERGENCY MEDICINE

## 2024-02-03 PROCEDURE — 80048 BASIC METABOLIC PNL TOTAL CA: CPT

## 2024-02-03 PROCEDURE — 99223 1ST HOSP IP/OBS HIGH 75: CPT | Performed by: NUCLEAR MEDICINE

## 2024-02-03 PROCEDURE — 80076 HEPATIC FUNCTION PANEL: CPT

## 2024-02-03 PROCEDURE — 1200000003 HC TELEMETRY R&B

## 2024-02-03 PROCEDURE — 85520 HEPARIN ASSAY: CPT

## 2024-02-03 PROCEDURE — 93306 TTE W/DOPPLER COMPLETE: CPT | Performed by: NUCLEAR MEDICINE

## 2024-02-03 PROCEDURE — 36415 COLL VENOUS BLD VENIPUNCTURE: CPT

## 2024-02-03 PROCEDURE — 6360000002 HC RX W HCPCS

## 2024-02-03 PROCEDURE — 2580000003 HC RX 258: Performed by: PHYSICIAN ASSISTANT

## 2024-02-03 PROCEDURE — 6370000000 HC RX 637 (ALT 250 FOR IP): Performed by: INTERNAL MEDICINE

## 2024-02-03 PROCEDURE — 99232 SBSQ HOSP IP/OBS MODERATE 35: CPT | Performed by: INTERNAL MEDICINE

## 2024-02-03 PROCEDURE — 2580000003 HC RX 258: Performed by: EMERGENCY MEDICINE

## 2024-02-03 PROCEDURE — 93010 ELECTROCARDIOGRAM REPORT: CPT | Performed by: INTERNAL MEDICINE

## 2024-02-03 PROCEDURE — 6360000002 HC RX W HCPCS: Performed by: PHYSICIAN ASSISTANT

## 2024-02-03 PROCEDURE — 93306 TTE W/DOPPLER COMPLETE: CPT

## 2024-02-03 RX ORDER — POTASSIUM CHLORIDE 20 MEQ/1
40 TABLET, EXTENDED RELEASE ORAL ONCE
Status: COMPLETED | OUTPATIENT
Start: 2024-02-03 | End: 2024-02-03

## 2024-02-03 RX ORDER — BUMETANIDE 0.25 MG/ML
1 INJECTION INTRAMUSCULAR; INTRAVENOUS 2 TIMES DAILY
Status: COMPLETED | OUTPATIENT
Start: 2024-02-03 | End: 2024-02-04

## 2024-02-03 RX ADMIN — AMOXICILLIN AND CLAVULANATE POTASSIUM 1 TABLET: 875; 125 TABLET, FILM COATED ORAL at 00:49

## 2024-02-03 RX ADMIN — BUMETANIDE 1 MG: 0.25 INJECTION, SOLUTION INTRAMUSCULAR; INTRAVENOUS at 15:44

## 2024-02-03 RX ADMIN — BUMETANIDE 1 MG: 0.25 INJECTION, SOLUTION INTRAMUSCULAR; INTRAVENOUS at 21:20

## 2024-02-03 RX ADMIN — DILTIAZEM HYDROCHLORIDE 7.5 MG/HR: 5 INJECTION INTRAVENOUS at 21:18

## 2024-02-03 RX ADMIN — AMOXICILLIN AND CLAVULANATE POTASSIUM 1 TABLET: 875; 125 TABLET, FILM COATED ORAL at 21:21

## 2024-02-03 RX ADMIN — BUMETANIDE 1 MG: 0.25 INJECTION INTRAMUSCULAR; INTRAVENOUS at 06:54

## 2024-02-03 RX ADMIN — ATORVASTATIN CALCIUM 40 MG: 40 TABLET, FILM COATED ORAL at 08:34

## 2024-02-03 RX ADMIN — POTASSIUM CHLORIDE 40 MEQ: 1500 TABLET, EXTENDED RELEASE ORAL at 15:44

## 2024-02-03 RX ADMIN — METOPROLOL SUCCINATE 50 MG: 50 TABLET, EXTENDED RELEASE ORAL at 08:34

## 2024-02-03 RX ADMIN — AMOXICILLIN AND CLAVULANATE POTASSIUM 1 TABLET: 875; 125 TABLET, FILM COATED ORAL at 08:34

## 2024-02-03 RX ADMIN — LEVOTHYROXINE SODIUM 25 MCG: 0.03 TABLET ORAL at 05:10

## 2024-02-03 RX ADMIN — DILTIAZEM HYDROCHLORIDE 7.5 MG/HR: 5 INJECTION INTRAVENOUS at 05:40

## 2024-02-03 RX ADMIN — SODIUM CHLORIDE, PRESERVATIVE FREE 10 ML: 5 INJECTION INTRAVENOUS at 21:20

## 2024-02-03 RX ADMIN — HEPARIN SODIUM 11 UNITS/KG/HR: 10000 INJECTION, SOLUTION INTRAVENOUS at 21:26

## 2024-02-03 RX ADMIN — TAMSULOSIN HYDROCHLORIDE 0.4 MG: 0.4 CAPSULE ORAL at 08:34

## 2024-02-03 NOTE — ED NOTES
VS stable. Telemetry in place. Patient up to the bathroom and back to cot. Call light in reach. Patient denies any needs at this time.

## 2024-02-03 NOTE — H&P
Hospitalist History & Physical    Patient:  Tato Cannon    Unit/Bed:COLLEEN /COLLEEN  YOB: 1940  MRN: [id number]   Acct: [id number]   PCP: Mao Spencer MD  Code Status: Prior    Date of Service: Pt seen/examined on 02/02/24 and admitted to Inpatient with expected LOS greater than two midnights due to medical therapy.     Chief Complaint: shortness of breath, leg swelling    Assessment/Plan:    Atrial fibrillation with rapid ventricular response  Placed on Cardizem drip in the emergency department.  Titrate to achieve heart rate less than 100 bpm.  Cardioversion attempted in May 2023 which was unsuccessful.  Replace electrolytes as needed.  [id number] 4.  Not currently on anticoagulation.  Discontinue Eliquis due to excessive bleeding.  Discussed Watchman device with the patient who is interested in learning more about this.  Outpatient referral placed to structural heart.  Heparin initiated in the emergency department.  Patient is agreeable to restart Eliquis upon discharge and then follow-up with structural heart for further management.  Consult cardiology  HFpEF in acute exacerbation  EF 50% via MARIA A on 5/18/2023  Heart cath in May 2023 with no significant coronary artery disease  Suspect exacerbation secondary to A-fib with RVR versus poor diet as patient reports eating microwave TV dinners as well as eating out frequently.  Received 2 mg of Bumex IV in the emergency department.  1 mg Bumex ordered for one-time dose tomorrow.  Strict ins and outs.  Low-sodium diet.  Fluid restriction.  Daily weights.  Defer additional diuresis to following clinician.  Cardiology consult as above.  Check limited echo.  Hypertension  Continue Toprol 50 mg daily.  Could consider uptitrating, but will defer to cardiology.  Hyperlipidemia  Continue Lipitor  Hypothyroidism  New diagnosis of.  TSH elevated at 7.35, T4 low at 0.77.  Start Synthroid 25 mcg daily.  BPH  Continue Flomax  Elevated LFTs  Suspect may

## 2024-02-03 NOTE — CONSULTS
[place] [address]                                  CONSULTATION    PATIENT NAME: SUSIE BERRIOS                  :        1940  MED REC NO:   [id number]                           ROOM:       [id number]  ACCOUNT NO:   [id number]                           ADMIT DATE: 2024  PROVIDER:     Nurys March M.D.    CONSULT DATE:  2024    REASON FOR CONSULTATION:  Congestive heart failure and atrial  fibrillation.    REQUESTING PROVIDER:  Hospitalist Service.    HISTORY OF PRESENT ILLNESS:  A pleasant 83-year-old gentleman with  history of apparently diastolic dysfunction, atrial fibrillation, and  CHF.  The patient apparently has had rate-controlled AFib and failed  cardioversion in the past.  He has been maintained on medical therapy;  however, he did not take his Eliquis because he was worried about  bleeding risk and, therefore, he has not been taking it.  I am not  certain about his compliance with the rest of the medications.  He comes  in with lower extremity edema, shortness of breath, and what seems to be  fluid overload.  He was found to be in AFib, rapid ventricular response.  We are consulted to assist in the management of the patient.    REVIEW OF SYSTEMS:  No fever, no chills, no weight loss.  No hematuria  or dysuria.  No abdominal pain, nausea, vomiting or diarrhea.  No  obvious suicidal ideation.  No skin rashes.  No obvious dizziness,  lightheadedness or loss of consciousness.  No recent trauma.  No  bleeding problem.  No HEENT-related problem.    PAST MEDICAL HISTORY:  1.  AFib.  2.  Hypertension.  3.  Hyperlipidemia.  4.  Congestive heart failure with diastolic dysfunction.    ALLERGIES:  CORICIDIN AND SULFA.    CURRENT MEDICATIONS:  Lipitor 40 a day, metoprolol 50 a day, Flomax 0.4  a day.    SOCIAL HISTORY:  No tobacco, no drugs, no alcohol.    FAMILY HISTORY:  Significant for coronary artery

## 2024-02-04 LAB
ANION GAP SERPL CALC-SCNC: 14 MEQ/L (ref 8–16)
BUN SERPL-MCNC: 25 MG/DL (ref 7–22)
CALCIUM SERPL-MCNC: 8.4 MG/DL (ref 8.5–10.5)
CHLORIDE SERPL-SCNC: 100 MEQ/L (ref 98–111)
CO2 SERPL-SCNC: 24 MEQ/L (ref 23–33)
CREAT SERPL-MCNC: 1.1 MG/DL (ref 0.4–1.2)
DEPRECATED RDW RBC AUTO: 51.6 FL (ref 35–45)
ERYTHROCYTE [DISTWIDTH] IN BLOOD BY AUTOMATED COUNT: 15.7 % (ref 11.5–14.5)
GFR SERPL CREATININE-BSD FRML MDRD: > 60 ML/MIN/1.73M2
GLUCOSE SERPL-MCNC: 138 MG/DL (ref 70–108)
HCT VFR BLD AUTO: 35.4 % (ref 42–52)
HEPARIN UNFRACTIONATED: 0.16 U/ML (ref 0.3–0.7)
HEPARIN UNFRACTIONATED: 0.3 U/ML (ref 0.3–0.7)
HEPARIN UNFRACTIONATED: 0.37 U/ML (ref 0.3–0.7)
HGB BLD-MCNC: 11.2 GM/DL (ref 14–18)
MAGNESIUM SERPL-MCNC: 1.5 MG/DL (ref 1.6–2.4)
MCH RBC QN AUTO: 28.9 PG (ref 26–33)
MCHC RBC AUTO-ENTMCNC: 31.6 GM/DL (ref 32.2–35.5)
MCV RBC AUTO: 91.2 FL (ref 80–94)
PLATELET # BLD AUTO: 218 THOU/MM3 (ref 130–400)
PMV BLD AUTO: 10.1 FL (ref 9.4–12.4)
POTASSIUM SERPL-SCNC: 3.2 MEQ/L (ref 3.5–5.2)
RBC # BLD AUTO: 3.88 MILL/MM3 (ref 4.7–6.1)
SODIUM SERPL-SCNC: 138 MEQ/L (ref 135–145)
WBC # BLD AUTO: 9.4 THOU/MM3 (ref 4.8–10.8)

## 2024-02-04 PROCEDURE — 83735 ASSAY OF MAGNESIUM: CPT

## 2024-02-04 PROCEDURE — 6370000000 HC RX 637 (ALT 250 FOR IP): Performed by: PHYSICIAN ASSISTANT

## 2024-02-04 PROCEDURE — 6360000002 HC RX W HCPCS: Performed by: PHYSICIAN ASSISTANT

## 2024-02-04 PROCEDURE — 1200000003 HC TELEMETRY R&B

## 2024-02-04 PROCEDURE — 85520 HEPARIN ASSAY: CPT

## 2024-02-04 PROCEDURE — 6360000002 HC RX W HCPCS: Performed by: INTERNAL MEDICINE

## 2024-02-04 PROCEDURE — 99232 SBSQ HOSP IP/OBS MODERATE 35: CPT | Performed by: INTERNAL MEDICINE

## 2024-02-04 PROCEDURE — 80048 BASIC METABOLIC PNL TOTAL CA: CPT

## 2024-02-04 PROCEDURE — 99232 SBSQ HOSP IP/OBS MODERATE 35: CPT | Performed by: PHYSICIAN ASSISTANT

## 2024-02-04 PROCEDURE — 36415 COLL VENOUS BLD VENIPUNCTURE: CPT

## 2024-02-04 PROCEDURE — 1200000000 HC SEMI PRIVATE

## 2024-02-04 PROCEDURE — 2580000003 HC RX 258: Performed by: PHYSICIAN ASSISTANT

## 2024-02-04 PROCEDURE — 85027 COMPLETE CBC AUTOMATED: CPT

## 2024-02-04 PROCEDURE — 6360000002 HC RX W HCPCS

## 2024-02-04 RX ORDER — METOPROLOL SUCCINATE 50 MG/1
50 TABLET, EXTENDED RELEASE ORAL 2 TIMES DAILY
Status: DISCONTINUED | OUTPATIENT
Start: 2024-02-04 | End: 2024-02-05

## 2024-02-04 RX ORDER — POTASSIUM CHLORIDE 20 MEQ/1
40 TABLET, EXTENDED RELEASE ORAL 2 TIMES DAILY WITH MEALS
Status: COMPLETED | OUTPATIENT
Start: 2024-02-04 | End: 2024-02-04

## 2024-02-04 RX ORDER — MAGNESIUM SULFATE IN WATER 40 MG/ML
2000 INJECTION, SOLUTION INTRAVENOUS ONCE
Status: COMPLETED | OUTPATIENT
Start: 2024-02-04 | End: 2024-02-04

## 2024-02-04 RX ADMIN — METOPROLOL SUCCINATE 50 MG: 50 TABLET, EXTENDED RELEASE ORAL at 22:13

## 2024-02-04 RX ADMIN — SODIUM CHLORIDE, PRESERVATIVE FREE 10 ML: 5 INJECTION INTRAVENOUS at 22:15

## 2024-02-04 RX ADMIN — AMOXICILLIN AND CLAVULANATE POTASSIUM 1 TABLET: 875; 125 TABLET, FILM COATED ORAL at 22:13

## 2024-02-04 RX ADMIN — TAMSULOSIN HYDROCHLORIDE 0.4 MG: 0.4 CAPSULE ORAL at 08:16

## 2024-02-04 RX ADMIN — AMOXICILLIN AND CLAVULANATE POTASSIUM 1 TABLET: 875; 125 TABLET, FILM COATED ORAL at 08:16

## 2024-02-04 RX ADMIN — BUMETANIDE 1 MG: 0.25 INJECTION, SOLUTION INTRAMUSCULAR; INTRAVENOUS at 22:15

## 2024-02-04 RX ADMIN — HEPARIN SODIUM 13 UNITS/KG/HR: 10000 INJECTION, SOLUTION INTRAVENOUS at 22:12

## 2024-02-04 RX ADMIN — BUMETANIDE 1 MG: 0.25 INJECTION, SOLUTION INTRAMUSCULAR; INTRAVENOUS at 08:15

## 2024-02-04 RX ADMIN — POTASSIUM CHLORIDE 40 MEQ: 1500 TABLET, EXTENDED RELEASE ORAL at 08:15

## 2024-02-04 RX ADMIN — ACETAMINOPHEN 650 MG: 325 TABLET ORAL at 08:19

## 2024-02-04 RX ADMIN — LEVOTHYROXINE SODIUM 25 MCG: 0.03 TABLET ORAL at 06:09

## 2024-02-04 RX ADMIN — POLYETHYLENE GLYCOL 3350 17 G: 17 POWDER, FOR SOLUTION ORAL at 15:10

## 2024-02-04 RX ADMIN — MAGNESIUM SULFATE HEPTAHYDRATE 2000 MG: 40 INJECTION, SOLUTION INTRAVENOUS at 08:28

## 2024-02-04 RX ADMIN — HEPARIN SODIUM 2000 UNITS: 1000 INJECTION INTRAVENOUS; SUBCUTANEOUS at 08:24

## 2024-02-04 RX ADMIN — METOPROLOL SUCCINATE 50 MG: 50 TABLET, EXTENDED RELEASE ORAL at 08:16

## 2024-02-04 RX ADMIN — POTASSIUM CHLORIDE 40 MEQ: 1500 TABLET, EXTENDED RELEASE ORAL at 15:09

## 2024-02-04 ASSESSMENT — PAIN DESCRIPTION - DESCRIPTORS: DESCRIPTORS: CRAMPING

## 2024-02-04 ASSESSMENT — PAIN DESCRIPTION - LOCATION: LOCATION: FOOT

## 2024-02-04 ASSESSMENT — PAIN SCALES - GENERAL
PAINLEVEL_OUTOF10: 0
PAINLEVEL_OUTOF10: 3

## 2024-02-04 ASSESSMENT — PAIN - FUNCTIONAL ASSESSMENT: PAIN_FUNCTIONAL_ASSESSMENT: ACTIVITIES ARE NOT PREVENTED

## 2024-02-04 ASSESSMENT — PAIN DESCRIPTION - ORIENTATION: ORIENTATION: RIGHT

## 2024-02-05 LAB
ANION GAP SERPL CALC-SCNC: 12 MEQ/L (ref 8–16)
BUN SERPL-MCNC: 25 MG/DL (ref 7–22)
CALCIUM SERPL-MCNC: 8.4 MG/DL (ref 8.5–10.5)
CHLORIDE SERPL-SCNC: 99 MEQ/L (ref 98–111)
CO2 SERPL-SCNC: 25 MEQ/L (ref 23–33)
CREAT SERPL-MCNC: 1.1 MG/DL (ref 0.4–1.2)
GFR SERPL CREATININE-BSD FRML MDRD: > 60 ML/MIN/1.73M2
GLUCOSE SERPL-MCNC: 121 MG/DL (ref 70–108)
HEPARIN UNFRACTIONATED: 0.27 U/ML (ref 0.3–0.7)
HEPARIN UNFRACTIONATED: 0.3 U/ML (ref 0.3–0.7)
HEPARIN UNFRACTIONATED: 0.32 U/ML (ref 0.3–0.7)
HEPARIN UNFRACTIONATED: 0.35 U/ML (ref 0.3–0.7)
MAGNESIUM SERPL-MCNC: 1.8 MG/DL (ref 1.6–2.4)
POTASSIUM SERPL-SCNC: 3.9 MEQ/L (ref 3.5–5.2)
SODIUM SERPL-SCNC: 136 MEQ/L (ref 135–145)

## 2024-02-05 PROCEDURE — 36415 COLL VENOUS BLD VENIPUNCTURE: CPT

## 2024-02-05 PROCEDURE — 6370000000 HC RX 637 (ALT 250 FOR IP): Performed by: PHYSICIAN ASSISTANT

## 2024-02-05 PROCEDURE — 2580000003 HC RX 258: Performed by: PHYSICIAN ASSISTANT

## 2024-02-05 PROCEDURE — 6360000002 HC RX W HCPCS: Performed by: INTERNAL MEDICINE

## 2024-02-05 PROCEDURE — 99232 SBSQ HOSP IP/OBS MODERATE 35: CPT | Performed by: INTERNAL MEDICINE

## 2024-02-05 PROCEDURE — 1200000000 HC SEMI PRIVATE

## 2024-02-05 PROCEDURE — 1200000003 HC TELEMETRY R&B

## 2024-02-05 PROCEDURE — 83735 ASSAY OF MAGNESIUM: CPT

## 2024-02-05 PROCEDURE — 6360000002 HC RX W HCPCS: Performed by: PHYSICIAN ASSISTANT

## 2024-02-05 PROCEDURE — 80048 BASIC METABOLIC PNL TOTAL CA: CPT

## 2024-02-05 PROCEDURE — 85520 HEPARIN ASSAY: CPT

## 2024-02-05 PROCEDURE — 6360000002 HC RX W HCPCS

## 2024-02-05 PROCEDURE — 99232 SBSQ HOSP IP/OBS MODERATE 35: CPT | Performed by: PHYSICIAN ASSISTANT

## 2024-02-05 RX ORDER — BUMETANIDE 0.25 MG/ML
1 INJECTION INTRAMUSCULAR; INTRAVENOUS 2 TIMES DAILY
Status: DISCONTINUED | OUTPATIENT
Start: 2024-02-05 | End: 2024-02-06 | Stop reason: SDUPTHER

## 2024-02-05 RX ORDER — MAGNESIUM SULFATE IN WATER 40 MG/ML
2000 INJECTION, SOLUTION INTRAVENOUS ONCE
Status: COMPLETED | OUTPATIENT
Start: 2024-02-05 | End: 2024-02-05

## 2024-02-05 RX ORDER — METOPROLOL SUCCINATE 25 MG/1
25 TABLET, EXTENDED RELEASE ORAL ONCE
Status: DISCONTINUED | OUTPATIENT
Start: 2024-02-05 | End: 2024-02-07

## 2024-02-05 RX ORDER — DIGOXIN 0.25 MG/ML
500 INJECTION INTRAMUSCULAR; INTRAVENOUS ONCE
Status: COMPLETED | OUTPATIENT
Start: 2024-02-05 | End: 2024-02-05

## 2024-02-05 RX ORDER — DIGOXIN 0.25 MG/ML
250 INJECTION INTRAMUSCULAR; INTRAVENOUS EVERY 6 HOURS
Status: COMPLETED | OUTPATIENT
Start: 2024-02-05 | End: 2024-02-05

## 2024-02-05 RX ADMIN — HEPARIN SODIUM 2000 UNITS: 1000 INJECTION INTRAVENOUS; SUBCUTANEOUS at 07:27

## 2024-02-05 RX ADMIN — ACETAMINOPHEN 650 MG: 325 TABLET ORAL at 03:33

## 2024-02-05 RX ADMIN — ACETAMINOPHEN 650 MG: 325 TABLET ORAL at 18:24

## 2024-02-05 RX ADMIN — DIGOXIN 250 MCG: 250 INJECTION, SOLUTION INTRAMUSCULAR; INTRAVENOUS; PARENTERAL at 22:01

## 2024-02-05 RX ADMIN — METOPROLOL SUCCINATE 50 MG: 50 TABLET, EXTENDED RELEASE ORAL at 08:06

## 2024-02-05 RX ADMIN — LEVOTHYROXINE SODIUM 25 MCG: 0.03 TABLET ORAL at 05:49

## 2024-02-05 RX ADMIN — DIGOXIN 250 MCG: 250 INJECTION, SOLUTION INTRAMUSCULAR; INTRAVENOUS; PARENTERAL at 16:15

## 2024-02-05 RX ADMIN — DIGOXIN 500 MCG: 250 INJECTION, SOLUTION INTRAMUSCULAR; INTRAVENOUS; PARENTERAL at 11:41

## 2024-02-05 RX ADMIN — MAGNESIUM SULFATE HEPTAHYDRATE 2000 MG: 40 INJECTION, SOLUTION INTRAVENOUS at 11:47

## 2024-02-05 RX ADMIN — BUMETANIDE 1 MG: 0.25 INJECTION INTRAMUSCULAR; INTRAVENOUS at 22:00

## 2024-02-05 RX ADMIN — TAMSULOSIN HYDROCHLORIDE 0.4 MG: 0.4 CAPSULE ORAL at 08:06

## 2024-02-05 RX ADMIN — AMOXICILLIN AND CLAVULANATE POTASSIUM 1 TABLET: 875; 125 TABLET, FILM COATED ORAL at 22:01

## 2024-02-05 RX ADMIN — SODIUM CHLORIDE, PRESERVATIVE FREE 10 ML: 5 INJECTION INTRAVENOUS at 22:01

## 2024-02-05 RX ADMIN — AMOXICILLIN AND CLAVULANATE POTASSIUM 1 TABLET: 875; 125 TABLET, FILM COATED ORAL at 08:06

## 2024-02-05 RX ADMIN — HEPARIN SODIUM 15 UNITS/KG/HR: 10000 INJECTION, SOLUTION INTRAVENOUS at 20:30

## 2024-02-05 RX ADMIN — METOPROLOL SUCCINATE 75 MG: 50 TABLET, EXTENDED RELEASE ORAL at 22:01

## 2024-02-05 ASSESSMENT — PAIN DESCRIPTION - LOCATION
LOCATION: HEAD
LOCATION: KNEE

## 2024-02-05 ASSESSMENT — PAIN DESCRIPTION - ORIENTATION
ORIENTATION: MID
ORIENTATION: RIGHT;LEFT

## 2024-02-05 ASSESSMENT — PAIN DESCRIPTION - DESCRIPTORS
DESCRIPTORS: ACHING
DESCRIPTORS: ACHING

## 2024-02-05 ASSESSMENT — PAIN SCALES - GENERAL
PAINLEVEL_OUTOF10: 5
PAINLEVEL_OUTOF10: 3

## 2024-02-05 NOTE — CARE COORDINATION
Case Management Assessment  Initial Evaluation    Date/Time of Evaluation: 2/5/2024 11:43 AM  Assessment Completed by: Lenora Munroe RN    If patient is discharged prior to next notation, then this note serves as note for discharge by case management.    Patient Name: Tato Cannon                   YOB: 1940  Diagnosis: Acute diastolic congestive heart failure (HCC) [I50.31]  Atrial fibrillation with rapid ventricular response (HCC) [I48.91]  Decompensated heart failure (HCC) [I50.9]                   Date / Time: 2/2/2024  1:49 PM  Location: [facility]28/028-A     Patient Admission Status: Inpatient   Readmission Risk Low 0-14, Mod 15-19), High > 20: Readmission Risk Score: 13.9    Current PCP: Mao Spnecer MD  PCP verified by CM? Yes    Chart Reviewed: Yes      History Provided by: Patient, Child/Family  Patient Orientation: Alert and Oriented, Other (see comment) ([place].)    Patient Cognition: Alert    Hospitalization in the last 30 days (Readmission):  No    If yes, Readmission Assessment in CM Navigator will be completed.    Advance Directives:      Code Status: Full Code   Patient's Primary Decision Maker is: Legal Next of Kin    Primary Decision Maker: Kristel Thomson - Child - [phone]    Primary Decision Maker: Silas Cannon - Child - [phone]    Discharge Planning:    Patient lives with: Children Type of Home: House  Primary Care Giver: Self  Patient Support Systems include: Children   Current Financial resources: Medicare  Current community resources:    Current services prior to admission: None            Current DME:              Type of Home Care services:  None    ADLS  Prior functional level: Assistance with the following:, Housework, Cooking, Shopping  Current functional level: Assistance with the following:, Housework, Cooking, Shopping    Family can provide assistance at DC:    Would you like Case Management to discuss the discharge plan with any other family

## 2024-02-06 LAB
ANION GAP SERPL CALC-SCNC: 8 MEQ/L (ref 8–16)
BUN SERPL-MCNC: 25 MG/DL (ref 7–22)
CALCIUM SERPL-MCNC: 8.1 MG/DL (ref 8.5–10.5)
CHLORIDE SERPL-SCNC: 98 MEQ/L (ref 98–111)
CO2 SERPL-SCNC: 28 MEQ/L (ref 23–33)
CREAT SERPL-MCNC: 1 MG/DL (ref 0.4–1.2)
DEPRECATED RDW RBC AUTO: 50.2 FL (ref 35–45)
ERYTHROCYTE [DISTWIDTH] IN BLOOD BY AUTOMATED COUNT: 15.4 % (ref 11.5–14.5)
GFR SERPL CREATININE-BSD FRML MDRD: > 60 ML/MIN/1.73M2
GLUCOSE SERPL-MCNC: 100 MG/DL (ref 70–108)
HCT VFR BLD AUTO: 33.7 % (ref 42–52)
HEPARIN UNFRACTIONATED: 0.41 U/ML (ref 0.3–0.7)
HGB BLD-MCNC: 10.7 GM/DL (ref 14–18)
MAGNESIUM SERPL-MCNC: 1.8 MG/DL (ref 1.6–2.4)
MCH RBC QN AUTO: 28.8 PG (ref 26–33)
MCHC RBC AUTO-ENTMCNC: 31.8 GM/DL (ref 32.2–35.5)
MCV RBC AUTO: 90.8 FL (ref 80–94)
PLATELET # BLD AUTO: 187 THOU/MM3 (ref 130–400)
PMV BLD AUTO: 10 FL (ref 9.4–12.4)
POTASSIUM SERPL-SCNC: 3.7 MEQ/L (ref 3.5–5.2)
RBC # BLD AUTO: 3.71 MILL/MM3 (ref 4.7–6.1)
SODIUM SERPL-SCNC: 134 MEQ/L (ref 135–145)
WBC # BLD AUTO: 9.5 THOU/MM3 (ref 4.8–10.8)

## 2024-02-06 PROCEDURE — 6370000000 HC RX 637 (ALT 250 FOR IP): Performed by: PHYSICIAN ASSISTANT

## 2024-02-06 PROCEDURE — 85520 HEPARIN ASSAY: CPT

## 2024-02-06 PROCEDURE — 99232 SBSQ HOSP IP/OBS MODERATE 35: CPT | Performed by: PHYSICIAN ASSISTANT

## 2024-02-06 PROCEDURE — 6360000002 HC RX W HCPCS: Performed by: INTERNAL MEDICINE

## 2024-02-06 PROCEDURE — 83735 ASSAY OF MAGNESIUM: CPT

## 2024-02-06 PROCEDURE — 1200000003 HC TELEMETRY R&B

## 2024-02-06 PROCEDURE — 6370000000 HC RX 637 (ALT 250 FOR IP): Performed by: INTERNAL MEDICINE

## 2024-02-06 PROCEDURE — 2580000003 HC RX 258: Performed by: PHYSICIAN ASSISTANT

## 2024-02-06 PROCEDURE — 99232 SBSQ HOSP IP/OBS MODERATE 35: CPT | Performed by: INTERNAL MEDICINE

## 2024-02-06 PROCEDURE — 80048 BASIC METABOLIC PNL TOTAL CA: CPT

## 2024-02-06 PROCEDURE — 85027 COMPLETE CBC AUTOMATED: CPT

## 2024-02-06 PROCEDURE — 36415 COLL VENOUS BLD VENIPUNCTURE: CPT

## 2024-02-06 PROCEDURE — 1200000000 HC SEMI PRIVATE

## 2024-02-06 RX ORDER — BUMETANIDE 0.25 MG/ML
1 INJECTION INTRAMUSCULAR; INTRAVENOUS 2 TIMES DAILY
Status: DISCONTINUED | OUTPATIENT
Start: 2024-02-06 | End: 2024-02-07

## 2024-02-06 RX ORDER — METOPROLOL SUCCINATE 25 MG/1
25 TABLET, EXTENDED RELEASE ORAL ONCE
Status: COMPLETED | OUTPATIENT
Start: 2024-02-06 | End: 2024-02-06

## 2024-02-06 RX ORDER — METOPROLOL SUCCINATE 100 MG/1
100 TABLET, EXTENDED RELEASE ORAL 2 TIMES DAILY
Status: DISCONTINUED | OUTPATIENT
Start: 2024-02-06 | End: 2024-02-08 | Stop reason: HOSPADM

## 2024-02-06 RX ORDER — DIGOXIN 250 MCG
250 TABLET ORAL DAILY
Status: DISCONTINUED | OUTPATIENT
Start: 2024-02-06 | End: 2024-02-07

## 2024-02-06 RX ORDER — MAGNESIUM SULFATE IN WATER 40 MG/ML
2000 INJECTION, SOLUTION INTRAVENOUS ONCE
Status: DISCONTINUED | OUTPATIENT
Start: 2024-02-06 | End: 2024-02-08 | Stop reason: HOSPADM

## 2024-02-06 RX ORDER — POTASSIUM CHLORIDE 20 MEQ/1
20 TABLET, EXTENDED RELEASE ORAL ONCE
Status: COMPLETED | OUTPATIENT
Start: 2024-02-06 | End: 2024-02-06

## 2024-02-06 RX ADMIN — ACETAMINOPHEN 650 MG: 325 TABLET ORAL at 02:39

## 2024-02-06 RX ADMIN — POTASSIUM CHLORIDE 20 MEQ: 1500 TABLET, EXTENDED RELEASE ORAL at 11:28

## 2024-02-06 RX ADMIN — APIXABAN 5 MG: 5 TABLET, FILM COATED ORAL at 19:40

## 2024-02-06 RX ADMIN — ACETAMINOPHEN 650 MG: 325 TABLET ORAL at 19:39

## 2024-02-06 RX ADMIN — LEVOTHYROXINE SODIUM 25 MCG: 0.03 TABLET ORAL at 05:10

## 2024-02-06 RX ADMIN — BUMETANIDE 1 MG: 0.25 INJECTION, SOLUTION INTRAMUSCULAR; INTRAVENOUS at 19:40

## 2024-02-06 RX ADMIN — SODIUM CHLORIDE, PRESERVATIVE FREE 10 ML: 5 INJECTION INTRAVENOUS at 19:40

## 2024-02-06 RX ADMIN — APIXABAN 5 MG: 5 TABLET, FILM COATED ORAL at 14:39

## 2024-02-06 RX ADMIN — METOPROLOL SUCCINATE 25 MG: 25 TABLET, EXTENDED RELEASE ORAL at 11:28

## 2024-02-06 RX ADMIN — METOPROLOL SUCCINATE 100 MG: 100 TABLET, EXTENDED RELEASE ORAL at 19:40

## 2024-02-06 RX ADMIN — TAMSULOSIN HYDROCHLORIDE 0.4 MG: 0.4 CAPSULE ORAL at 09:16

## 2024-02-06 RX ADMIN — METOPROLOL SUCCINATE 75 MG: 50 TABLET, EXTENDED RELEASE ORAL at 09:16

## 2024-02-06 RX ADMIN — SODIUM CHLORIDE, PRESERVATIVE FREE 10 ML: 5 INJECTION INTRAVENOUS at 09:17

## 2024-02-06 RX ADMIN — BUMETANIDE 1 MG: 0.25 INJECTION, SOLUTION INTRAMUSCULAR; INTRAVENOUS at 09:15

## 2024-02-06 RX ADMIN — DIGOXIN 250 MCG: 0.25 TABLET ORAL at 12:20

## 2024-02-06 ASSESSMENT — PAIN DESCRIPTION - ORIENTATION
ORIENTATION: MID
ORIENTATION: MID

## 2024-02-06 ASSESSMENT — PAIN SCALES - GENERAL
PAINLEVEL_OUTOF10: 4
PAINLEVEL_OUTOF10: 4

## 2024-02-06 ASSESSMENT — PAIN DESCRIPTION - DESCRIPTORS
DESCRIPTORS: ACHING
DESCRIPTORS: ACHING

## 2024-02-06 ASSESSMENT — PAIN DESCRIPTION - LOCATION
LOCATION: HEAD
LOCATION: HEAD

## 2024-02-07 ENCOUNTER — APPOINTMENT (OUTPATIENT)
Dept: ULTRASOUND IMAGING | Age: 84
DRG: 308 | End: 2024-02-07
Payer: MEDICARE

## 2024-02-07 LAB
ALBUMIN SERPL BCG-MCNC: 2.8 G/DL (ref 3.5–5.1)
ALP SERPL-CCNC: 221 U/L (ref 38–126)
ALT SERPL W/O P-5'-P-CCNC: 89 U/L (ref 11–66)
ANION GAP SERPL CALC-SCNC: 11 MEQ/L (ref 8–16)
ANION GAP SERPL CALC-SCNC: 12 MEQ/L (ref 8–16)
AST SERPL-CCNC: 51 U/L (ref 5–40)
BACTERIA BLD AEROBE CULT: NORMAL
BACTERIA BLD AEROBE CULT: NORMAL
BILIRUB CONJ SERPL-MCNC: 0.5 MG/DL (ref 0–0.3)
BILIRUB SERPL-MCNC: 1 MG/DL (ref 0.3–1.2)
BUN SERPL-MCNC: 23 MG/DL (ref 7–22)
BUN SERPL-MCNC: 24 MG/DL (ref 7–22)
CALCIUM SERPL-MCNC: 8.3 MG/DL (ref 8.5–10.5)
CALCIUM SERPL-MCNC: 8.4 MG/DL (ref 8.5–10.5)
CHLORIDE SERPL-SCNC: 95 MEQ/L (ref 98–111)
CHLORIDE SERPL-SCNC: 96 MEQ/L (ref 98–111)
CO2 SERPL-SCNC: 25 MEQ/L (ref 23–33)
CO2 SERPL-SCNC: 28 MEQ/L (ref 23–33)
CREAT SERPL-MCNC: 1 MG/DL (ref 0.4–1.2)
CREAT SERPL-MCNC: 1 MG/DL (ref 0.4–1.2)
DIGOXIN SERPL-MCNC: 1.5 NG/ML (ref 0.5–2)
GFR SERPL CREATININE-BSD FRML MDRD: > 60 ML/MIN/1.73M2
GFR SERPL CREATININE-BSD FRML MDRD: > 60 ML/MIN/1.73M2
GLUCOSE SERPL-MCNC: 100 MG/DL (ref 70–108)
GLUCOSE SERPL-MCNC: 97 MG/DL (ref 70–108)
HEPARIN UNFRACTIONATED: 0.68 U/ML (ref 0.3–0.7)
MAGNESIUM SERPL-MCNC: 1.7 MG/DL (ref 1.6–2.4)
POTASSIUM SERPL-SCNC: 3.7 MEQ/L (ref 3.5–5.2)
POTASSIUM SERPL-SCNC: 3.8 MEQ/L (ref 3.5–5.2)
PROT SERPL-MCNC: 5.6 G/DL (ref 6.1–8)
SODIUM SERPL-SCNC: 133 MEQ/L (ref 135–145)
SODIUM SERPL-SCNC: 134 MEQ/L (ref 135–145)

## 2024-02-07 PROCEDURE — 99233 SBSQ HOSP IP/OBS HIGH 50: CPT | Performed by: STUDENT IN AN ORGANIZED HEALTH CARE EDUCATION/TRAINING PROGRAM

## 2024-02-07 PROCEDURE — 6370000000 HC RX 637 (ALT 250 FOR IP): Performed by: REGISTERED NURSE

## 2024-02-07 PROCEDURE — 6360000002 HC RX W HCPCS: Performed by: INTERNAL MEDICINE

## 2024-02-07 PROCEDURE — 1200000003 HC TELEMETRY R&B

## 2024-02-07 PROCEDURE — 6370000000 HC RX 637 (ALT 250 FOR IP): Performed by: INTERNAL MEDICINE

## 2024-02-07 PROCEDURE — 6370000000 HC RX 637 (ALT 250 FOR IP): Performed by: STUDENT IN AN ORGANIZED HEALTH CARE EDUCATION/TRAINING PROGRAM

## 2024-02-07 PROCEDURE — 82248 BILIRUBIN DIRECT: CPT

## 2024-02-07 PROCEDURE — 6370000000 HC RX 637 (ALT 250 FOR IP): Performed by: PHYSICIAN ASSISTANT

## 2024-02-07 PROCEDURE — 80053 COMPREHEN METABOLIC PANEL: CPT

## 2024-02-07 PROCEDURE — 76705 ECHO EXAM OF ABDOMEN: CPT

## 2024-02-07 PROCEDURE — 85520 HEPARIN ASSAY: CPT

## 2024-02-07 PROCEDURE — 83735 ASSAY OF MAGNESIUM: CPT

## 2024-02-07 PROCEDURE — 99232 SBSQ HOSP IP/OBS MODERATE 35: CPT | Performed by: REGISTERED NURSE

## 2024-02-07 PROCEDURE — 80162 ASSAY OF DIGOXIN TOTAL: CPT

## 2024-02-07 PROCEDURE — 36415 COLL VENOUS BLD VENIPUNCTURE: CPT

## 2024-02-07 PROCEDURE — 2580000003 HC RX 258: Performed by: PHYSICIAN ASSISTANT

## 2024-02-07 RX ORDER — BUMETANIDE 1 MG/1
1 TABLET ORAL 2 TIMES DAILY
Status: DISCONTINUED | OUTPATIENT
Start: 2024-02-07 | End: 2024-02-07

## 2024-02-07 RX ORDER — BUMETANIDE 1 MG/1
1 TABLET ORAL 2 TIMES DAILY
Status: DISCONTINUED | OUTPATIENT
Start: 2024-02-07 | End: 2024-02-08 | Stop reason: HOSPADM

## 2024-02-07 RX ORDER — VALSARTAN 40 MG/1
20 TABLET ORAL 2 TIMES DAILY
Status: DISCONTINUED | OUTPATIENT
Start: 2024-02-07 | End: 2024-02-08 | Stop reason: HOSPADM

## 2024-02-07 RX ORDER — DIGOXIN 125 MCG
125 TABLET ORAL DAILY
Status: DISCONTINUED | OUTPATIENT
Start: 2024-02-08 | End: 2024-02-08 | Stop reason: HOSPADM

## 2024-02-07 RX ORDER — VALSARTAN 40 MG/1
40 TABLET ORAL DAILY
Status: DISCONTINUED | OUTPATIENT
Start: 2024-02-07 | End: 2024-02-07

## 2024-02-07 RX ADMIN — BUMETANIDE 1 MG: 0.25 INJECTION, SOLUTION INTRAMUSCULAR; INTRAVENOUS at 08:04

## 2024-02-07 RX ADMIN — METOPROLOL SUCCINATE 100 MG: 100 TABLET, EXTENDED RELEASE ORAL at 21:39

## 2024-02-07 RX ADMIN — APIXABAN 5 MG: 5 TABLET, FILM COATED ORAL at 21:39

## 2024-02-07 RX ADMIN — SODIUM CHLORIDE, PRESERVATIVE FREE 10 ML: 5 INJECTION INTRAVENOUS at 08:04

## 2024-02-07 RX ADMIN — DIGOXIN 250 MCG: 0.25 TABLET ORAL at 08:03

## 2024-02-07 RX ADMIN — TAMSULOSIN HYDROCHLORIDE 0.4 MG: 0.4 CAPSULE ORAL at 08:03

## 2024-02-07 RX ADMIN — SODIUM CHLORIDE, PRESERVATIVE FREE 10 ML: 5 INJECTION INTRAVENOUS at 21:39

## 2024-02-07 RX ADMIN — METOPROLOL SUCCINATE 100 MG: 100 TABLET, EXTENDED RELEASE ORAL at 08:03

## 2024-02-07 RX ADMIN — VALSARTAN 20 MG: 40 TABLET ORAL at 21:38

## 2024-02-07 RX ADMIN — BUMETANIDE 1 MG: 1 TABLET ORAL at 21:39

## 2024-02-07 RX ADMIN — LEVOTHYROXINE SODIUM 25 MCG: 0.03 TABLET ORAL at 05:10

## 2024-02-07 RX ADMIN — APIXABAN 5 MG: 5 TABLET, FILM COATED ORAL at 08:03

## 2024-02-07 ASSESSMENT — PAIN DESCRIPTION - LOCATION: LOCATION: HEAD

## 2024-02-07 ASSESSMENT — PAIN DESCRIPTION - DESCRIPTORS: DESCRIPTORS: ACHING

## 2024-02-07 ASSESSMENT — PAIN SCALES - GENERAL
PAINLEVEL_OUTOF10: 4
PAINLEVEL_OUTOF10: 0

## 2024-02-07 ASSESSMENT — PAIN DESCRIPTION - ORIENTATION: ORIENTATION: MID

## 2024-02-08 VITALS
HEART RATE: 66 BPM | WEIGHT: 177.25 LBS | DIASTOLIC BLOOD PRESSURE: 77 MMHG | TEMPERATURE: 97.4 F | RESPIRATION RATE: 18 BRPM | BODY MASS INDEX: 24.01 KG/M2 | HEIGHT: 72 IN | SYSTOLIC BLOOD PRESSURE: 130 MMHG | OXYGEN SATURATION: 95 %

## 2024-02-08 LAB
ANION GAP SERPL CALC-SCNC: 9 MEQ/L (ref 8–16)
BUN SERPL-MCNC: 20 MG/DL (ref 7–22)
CALCIUM SERPL-MCNC: 8.7 MG/DL (ref 8.5–10.5)
CHLORIDE SERPL-SCNC: 93 MEQ/L (ref 98–111)
CO2 SERPL-SCNC: 29 MEQ/L (ref 23–33)
CREAT SERPL-MCNC: 0.8 MG/DL (ref 0.4–1.2)
DEPRECATED RDW RBC AUTO: 50.6 FL (ref 35–45)
ERYTHROCYTE [DISTWIDTH] IN BLOOD BY AUTOMATED COUNT: 15.2 % (ref 11.5–14.5)
GFR SERPL CREATININE-BSD FRML MDRD: > 60 ML/MIN/1.73M2
GLUCOSE SERPL-MCNC: 95 MG/DL (ref 70–108)
HCT VFR BLD AUTO: 36.9 % (ref 42–52)
HGB BLD-MCNC: 11.8 GM/DL (ref 14–18)
MAGNESIUM SERPL-MCNC: 1.7 MG/DL (ref 1.6–2.4)
MCH RBC QN AUTO: 29.4 PG (ref 26–33)
MCHC RBC AUTO-ENTMCNC: 32 GM/DL (ref 32.2–35.5)
MCV RBC AUTO: 92 FL (ref 80–94)
PHOSPHATE SERPL-MCNC: 2.9 MG/DL (ref 2.4–4.7)
PLATELET # BLD AUTO: 172 THOU/MM3 (ref 130–400)
PMV BLD AUTO: 9.6 FL (ref 9.4–12.4)
POTASSIUM SERPL-SCNC: 3.7 MEQ/L (ref 3.5–5.2)
RBC # BLD AUTO: 4.01 MILL/MM3 (ref 4.7–6.1)
SODIUM SERPL-SCNC: 131 MEQ/L (ref 135–145)
WBC # BLD AUTO: 9 THOU/MM3 (ref 4.8–10.8)

## 2024-02-08 PROCEDURE — 84100 ASSAY OF PHOSPHORUS: CPT

## 2024-02-08 PROCEDURE — 6370000000 HC RX 637 (ALT 250 FOR IP): Performed by: INTERNAL MEDICINE

## 2024-02-08 PROCEDURE — 6370000000 HC RX 637 (ALT 250 FOR IP): Performed by: PHYSICIAN ASSISTANT

## 2024-02-08 PROCEDURE — 83735 ASSAY OF MAGNESIUM: CPT

## 2024-02-08 PROCEDURE — 2580000003 HC RX 258: Performed by: PHYSICIAN ASSISTANT

## 2024-02-08 PROCEDURE — 85027 COMPLETE CBC AUTOMATED: CPT

## 2024-02-08 PROCEDURE — 99232 SBSQ HOSP IP/OBS MODERATE 35: CPT | Performed by: PHYSICIAN ASSISTANT

## 2024-02-08 PROCEDURE — 6370000000 HC RX 637 (ALT 250 FOR IP): Performed by: REGISTERED NURSE

## 2024-02-08 PROCEDURE — 6370000000 HC RX 637 (ALT 250 FOR IP): Performed by: STUDENT IN AN ORGANIZED HEALTH CARE EDUCATION/TRAINING PROGRAM

## 2024-02-08 PROCEDURE — 80048 BASIC METABOLIC PNL TOTAL CA: CPT

## 2024-02-08 PROCEDURE — 36415 COLL VENOUS BLD VENIPUNCTURE: CPT

## 2024-02-08 RX ORDER — VALSARTAN 40 MG/1
20 TABLET ORAL 2 TIMES DAILY
Qty: 30 TABLET | Refills: 0 | Status: SHIPPED | OUTPATIENT
Start: 2024-02-08

## 2024-02-08 RX ORDER — POTASSIUM CHLORIDE 750 MG/1
20 TABLET, EXTENDED RELEASE ORAL DAILY
Qty: 30 TABLET | Refills: 0 | Status: SHIPPED | OUTPATIENT
Start: 2024-02-08 | End: 2024-02-23

## 2024-02-08 RX ORDER — LEVOTHYROXINE SODIUM 0.03 MG/1
25 TABLET ORAL DAILY
Qty: 30 TABLET | Refills: 0 | Status: SHIPPED | OUTPATIENT
Start: 2024-02-09

## 2024-02-08 RX ORDER — DIGOXIN 125 MCG
125 TABLET ORAL DAILY
Qty: 30 TABLET | Refills: 0 | Status: SHIPPED | OUTPATIENT
Start: 2024-02-09

## 2024-02-08 RX ORDER — METOPROLOL SUCCINATE 100 MG/1
100 TABLET, EXTENDED RELEASE ORAL 2 TIMES DAILY
Qty: 30 TABLET | Refills: 0 | Status: SHIPPED | OUTPATIENT
Start: 2024-02-08

## 2024-02-08 RX ORDER — LANOLIN ALCOHOL/MO/W.PET/CERES
400 CREAM (GRAM) TOPICAL DAILY
Status: DISCONTINUED | OUTPATIENT
Start: 2024-02-08 | End: 2024-02-08 | Stop reason: HOSPADM

## 2024-02-08 RX ORDER — POTASSIUM CHLORIDE 20 MEQ/1
20 TABLET, EXTENDED RELEASE ORAL
Status: DISCONTINUED | OUTPATIENT
Start: 2024-02-08 | End: 2024-02-08 | Stop reason: HOSPADM

## 2024-02-08 RX ORDER — BUMETANIDE 1 MG/1
2 TABLET ORAL DAILY
Qty: 30 TABLET | Refills: 0 | Status: SHIPPED | OUTPATIENT
Start: 2024-02-08

## 2024-02-08 RX ADMIN — DIGOXIN 125 MCG: 0.12 TABLET ORAL at 09:10

## 2024-02-08 RX ADMIN — LEVOTHYROXINE SODIUM 25 MCG: 0.03 TABLET ORAL at 06:41

## 2024-02-08 RX ADMIN — TAMSULOSIN HYDROCHLORIDE 0.4 MG: 0.4 CAPSULE ORAL at 09:10

## 2024-02-08 RX ADMIN — SODIUM CHLORIDE, PRESERVATIVE FREE 10 ML: 5 INJECTION INTRAVENOUS at 09:11

## 2024-02-08 RX ADMIN — Medication 400 MG: at 11:25

## 2024-02-08 RX ADMIN — VALSARTAN 20 MG: 40 TABLET ORAL at 09:10

## 2024-02-08 RX ADMIN — APIXABAN 5 MG: 5 TABLET, FILM COATED ORAL at 09:10

## 2024-02-08 RX ADMIN — METOPROLOL SUCCINATE 100 MG: 100 TABLET, EXTENDED RELEASE ORAL at 09:10

## 2024-02-08 RX ADMIN — BUMETANIDE 1 MG: 1 TABLET ORAL at 09:10

## 2024-02-08 RX ADMIN — POTASSIUM CHLORIDE 20 MEQ: 1500 TABLET, EXTENDED RELEASE ORAL at 11:25

## 2024-02-08 ASSESSMENT — PAIN SCALES - GENERAL: PAINLEVEL_OUTOF10: 0

## 2024-02-08 NOTE — CARE COORDINATION
2/8/24, 2:25 PM EST    Patient goals/plan/ treatment preferences discussed by  and .  Patient goals/plan/ treatment preferences reviewed with patient/ family.  Patient/ family verbalize understanding of discharge plan and are in agreement with goal/plan/treatment preferences.  Understanding was demonstrated using the teach back method.  AVS provided by RN at time of discharge, which includes all necessary medical information pertaining to the patients current course of illness, treatment, post-discharge goals of care, and treatment preferences.     Services At/After Discharge: None       IMM Letter  IMM Letter given to Patient/Family/Significant other/Guardian/POA/by:: Lenora HANNAH Case Manager  IMM Letter date given:: 02/08/24  IMM Letter time given:: 0909     Discharge ordered. Pt declined HH services again. States he will obtain HH services if needed. Discharged home with son as prior to admission.

## 2024-02-08 NOTE — PROGRESS NOTES
Hospitalist Progress Note      Patient:  Tato Cannon    Unit/Bed:8B-28/028-A  YOB: 1940  MRN: [id number]   Acct: [id number]   PCP: Mao Spencer MD  Date of Admission: 2/2/2024    Assessment/Plan:  Elderly male with chronic A-fib presented with signs of volume overload in the setting of Afib RVR, concerning for acute exacerbation of heart failure.  Status post IV diuresis.  Will transition to p.o. diuretics.  Remains in A-fib but rate controlled. If continues to remain stable with adequate response to p.o., will plan to discharge tomorrow.         Acute on ?chronic HF exacerbation: NYHA class II.  Asymptomatic at this time.  Mild dyspnea at baseline with exertion with no acute issues at this time.  Patient unable to clarify if he is truly taking all his medications.  No diuretics in his home meds.    Last MARIA A 5/18/2023 with normal EF 50% with mild TR mild MR and mildly dilated LA, no mention of any diastolic dysfunction.  Repeat TTE this admission done on 2/3/2024 with drop in EF 40-45%, with evidence of moderate global hypokinesis, severe hypokinesis of apical anterior segment.  Normal diastolic function.  Moderately dilated RV, moderate TR, mildly dilated LA, moderately dilated RA.  Due to recent cardiac cath done 5/19/2023 showing no significant CAD, no indication for repeat ischemic workup at this.  Acute systolic dysfunction and other abnormalities could be related to chronic A-fib induced tachycardia, in the setting of likely noncompliance with meds.  IV Bumex changed to p.o. Bumex at this time. Diet education-dietitian/HF coordinator consulted.   Given his low normal BP range, will hold off on adding other meds.  Will need to follow-up outpatient with cardiology/heart failure clinic for optimization of GDMT.    GDMT:   [x]BB:   []ACE / ARB/ ARNI:  [x]Diuretics:   []MRA:   []SGLT2i:   Salt/ Fluid restriction: yes  Daily weights: 
                      Hospitalist Progress Note      Patient:  Tato Cannon    Unit/Bed:8B-28/028-A  YOB: 1940  MRN: [id number]   Acct: [id number]     PCP: Mao Spencer MD  Date of Admission: 2/2/2024       Chief Complaint: shortness of breath, leg swelling    Assessment/Plan:    Atrial fibrillation with rapid ventricular response-resolved currently still in A-fib however rate controlled 2/3  Placed on Cardizem drip in the emergency department.  Titrate to achieve heart rate less than 100 bpm.  Cardioversion attempted in May 2023 which was unsuccessful.  Replace electrolytes as needed.  [id number] 4.  Not currently on anticoagulation.  Discontinue Eliquis due to excessive bleeding.  Discussed Watchman device with the patient who is interested in learning more about this.  Outpatient referral placed to structural heart.  Heparin initiated in the emergency department.  Patient is agreeable to restart Eliquis upon discharge and then follow-up with structural heart for further management.  Consult cardiology  Changed to oral Cardizem?  Cardiology is consulted  2/4-reviewed cardiology notes-will stop Cardizem drip-already on Lopressor patient received it and heart rate has been under control-await echo  2/5-still in A-fib had another RVR issue through the night-blood pressures are low as well so digoxin IV added per cardiology holding on the amnio drip for now because of low blood pressures-holding Bumex today as well diuresed quite okay through the night-echo revealed ejection fraction 50%-family interested in watchman's procedure  2/6- HR controlled curently- will change heparin to eliquis  HFpEF in acute exacerbation  EF 50% via MARIA A on 5/18/2023  Heart cath in May 2023 with no significant coronary artery disease  Suspect exacerbation secondary to A-fib with RVR versus poor diet as patient reports eating microwave TV dinners as well as eating out frequently.  Received 2 mg of Bumex IV in the 
                      Hospitalist Progress Note      Patient:  Tato Cannon    Unit/Bed:8B-28/028-A  YOB: 1940  MRN: [id number]   Acct: [id number]     PCP: Mao Spencer MD  Date of Admission: 2/2/2024       Chief Complaint: shortness of breath, leg swelling    Assessment/Plan:    Atrial fibrillation with rapid ventricular response-resolved currently still in A-fib however rate controlled 2/3  Placed on Cardizem drip in the emergency department.  Titrate to achieve heart rate less than 100 bpm.  Cardioversion attempted in May 2023 which was unsuccessful.  Replace electrolytes as needed.  [id number] 4.  Not currently on anticoagulation.  Discontinue Eliquis due to excessive bleeding.  Discussed Watchman device with the patient who is interested in learning more about this.  Outpatient referral placed to structural heart.  Heparin initiated in the emergency department.  Patient is agreeable to restart Eliquis upon discharge and then follow-up with structural heart for further management.  Consult cardiology  Changed to oral Cardizem?  Cardiology is consulted  2/4-reviewed cardiology notes-will stop Cardizem drip-already on Lopressor patient received it and heart rate has been under control-await echo  2/5-still in A-fib had another RVR issue through the night-blood pressures are low as well so digoxin IV added per cardiology holding on the amnio drip for now because of low blood pressures-holding Bumex today as well diuresed quite okay through the night-echo revealed ejection fraction 50%-family interested in watchman's procedure  HFpEF in acute exacerbation  EF 50% via MARIA A on 5/18/2023  Heart cath in May 2023 with no significant coronary artery disease  Suspect exacerbation secondary to A-fib with RVR versus poor diet as patient reports eating microwave TV dinners as well as eating out frequently.  Received 2 mg of Bumex IV in the emergency department.  1 mg Bumex ordered for one-time dose 
                      Hospitalist Progress Note      Patient:  Tato Cannon    Unit/Bed:8B-28/028-A  YOB: 1940  MRN: [id number]   Acct: [id number]     PCP: Mao Spencer MD  Date of Admission: 2/2/2024       Chief Complaint: shortness of breath, leg swelling    Assessment/Plan:    Atrial fibrillation with rapid ventricular response-resolved currently still in A-fib however rate controlled 2/3  Placed on Cardizem drip in the emergency department.  Titrate to achieve heart rate less than 100 bpm.  Cardioversion attempted in May 2023 which was unsuccessful.  Replace electrolytes as needed.  [id number] 4.  Not currently on anticoagulation.  Discontinue Eliquis due to excessive bleeding.  Discussed Watchman device with the patient who is interested in learning more about this.  Outpatient referral placed to structural heart.  Heparin initiated in the emergency department.  Patient is agreeable to restart Eliquis upon discharge and then follow-up with structural heart for further management.  Consult cardiology  Changed to oral Cardizem?  Cardiology is consulted  HFpEF in acute exacerbation  EF 50% via MARIA A on 5/18/2023  Heart cath in May 2023 with no significant coronary artery disease  Suspect exacerbation secondary to A-fib with RVR versus poor diet as patient reports eating microwave TV dinners as well as eating out frequently.  Received 2 mg of Bumex IV in the emergency department.  1 mg Bumex ordered for one-time dose tomorrow.  Strict ins and outs.  Low-sodium diet.  Fluid restriction.  Daily weights.  Defer additional diuresis to following clinician.  Cardiology consult as above.  Check limited echo.  2/3-continue IV diuretics ordered 1 mg Bumex twice daily, intake and output and daily weights, BMP and electrolytes a.m.  Hypertension  Continue Toprol 50 mg daily.  Could consider uptitrating, but will defer to cardiology.  Hyperlipidemia  Continue Lipitor  Hypothyroidism  New diagnosis of.  
  Physician Progress Note      PATIENT:               SUSIE BERRIOS  CSN #:                  [id number]  :                       1940  ADMIT DATE:       2024 1:49 PM  DISCH DATE:  RESPONDING  PROVIDER #:        Hetal Gonzales MD          QUERY TEXT:    Good Afternoon    This patient admitted on 2024- Acute on chronic CHF and A-fb    The patient was not on Eliquis prior to admission, but Eliquis has now been   started this admission.    f possible, please document in progress notes and discharge summary if you are   evaluating and/or treating any of the following:    The medical record reflects the following:  Risk Factors: Age (83)- Male, CHF< HTN Persistent A-Fib  Clinical Indicators: 83 yr old male admitted for Persistent A-fib, Failed   cardioversion before and had not been taking anticoagulant . at home PTA,   however, Eliquis has now been started this admission. MD notes on   [id number] - VASc 4  Treatment: Eliquis has been started on 2024-2024    Thank you  EZEKIEL Ng,RN, CPHQ, CCDS, SMART  Options provided:  -- Secondary hypercoagulable state in a patient with atrial fibrillation  -- Other - I will add my own diagnosis  -- Disagree - Not applicable / Not valid  -- Disagree - Clinically unable to determine / Unknown  -- Refer to Clinical Documentation Reviewer    PROVIDER RESPONSE TEXT:    This patient has secondary hypercoagulable state in a patient with atrial   fibrillation.    Query created by: Kacie Duke on 2024 12:23 PM      Electronically signed by:  Hetal Gonzales MD 2024 7:40 AM          
Cardiology Progress Note      Patient:  Tato Cannon  YOB: 1940  MRN: [id number]   Acct: [id number]  Admit Date:  2/2/2024  Primary Cardiologist: Kavin العراقي MD    Note per dr duke \"REASON FOR CONSULTATION:  Congestive heart failure and atrial  fibrillation.     REQUESTING PROVIDER:  Hospitalist Service.     HISTORY OF PRESENT ILLNESS:  A pleasant 83-year-old gentleman with  history of apparently diastolic dysfunction, atrial fibrillation, and  CHF.  The patient apparently has had rate-controlled AFib and failed  cardioversion in the past.  He has been maintained on medical therapy;  however, he did not take his Eliquis because he was worried about  bleeding risk and, therefore, he has not been taking it.  I am not  certain about his compliance with the rest of the medications.  He comes  in with lower extremity edema, shortness of breath, and what seems to be  fluid overload.  He was found to be in AFib, rapid ventricular response.  We are consulted to assist in the management of the patient.\"    Subjective (Events in last 24 hours):   Seen in follow up resting comfortably in bed. Denies chest pain, SOB, palpitaitons, dizziness. Does have mild HARDY he feels at baseline.     Remains in AF- rate controlled on current regimen.   VSS; on room air    Objective:   /74   Pulse 83   Temp 97.7 °F (36.5 °C) (Oral)   Resp 17   Ht 1.829 m (6')   Wt 80.4 kg (177 lb 4 oz)   SpO2 95%   BMI 24.04 kg/m²        TELEMETRY: afib cvr     Physical Exam:  General Appearance: alert and oriented to person, place and time, in no acute distress  Cardiovascular: irregularly irregular; no JVD; faint murmur, no rubs/clicks.  Pulmonary/Chest: unlabored effort; mildly diminished to bilateral bases.   Abdomen: soft, non-tender, non-distended, normal bowel sounds, no masses Extremities: +1 ble edema, pulses intact    Skin: warm and dry, no rash or erythema  Head: normocephalic and atraumatic  Eyes: pupils equal, 
Cardiology Progress Note      Patient:  Tato Cannon  YOB: 1940  MRN: [id number]   Acct: [id number]  Admit Date:  2/2/2024  Primary Cardiologist: Kavin العراقي MD    Note per dr duke \"REASON FOR CONSULTATION:  Congestive heart failure and atrial  fibrillation.     REQUESTING PROVIDER:  Hospitalist Service.     HISTORY OF PRESENT ILLNESS:  A pleasant 83-year-old gentleman with  history of apparently diastolic dysfunction, atrial fibrillation, and  CHF.  The patient apparently has had rate-controlled AFib and failed  cardioversion in the past.  He has been maintained on medical therapy;  however, he did not take his Eliquis because he was worried about  bleeding risk and, therefore, he has not been taking it.  I am not  certain about his compliance with the rest of the medications.  He comes  in with lower extremity edema, shortness of breath, and what seems to be  fluid overload.  He was found to be in AFib, rapid ventricular response.  We are consulted to assist in the management of the patient.\"    Subjective (Events in last 24 hours): pt awake and alert.  NAD. No cp or sob.  Edema improving, not resolved  On heparin gtt  On cdz at 7.5mg/hr    On RA  Net I/o -4.3 L    Objective:   /78   Pulse 64   Temp 98.1 °F (36.7 °C) (Oral)   Resp 18   Ht 1.829 m (6')   Wt 84.5 kg (186 lb 4.6 oz)   SpO2 97%   BMI 25.27 kg/m²        TELEMETRY: afib cvr 93    Physical Exam:  General Appearance: alert and oriented to person, place and time, in no acute distress  Cardiovascular: irregularly irregulr  Pulmonary/Chest: clear to auscultation bilaterally- no wheezes, rales or rhonchi, normal air movement, no respiratory distress  Abdomen: soft, non-tender, non-distended, normal bowel sounds, no masses Extremities: +1 ble edema, pulse   Skin: warm and dry, no rash or erythema  Head: normocephalic and atraumatic  Eyes: pupils equal, round, and reactive to light  Neck: supple and non-tender without mass, 
Cardiology Progress Note      Patient:  Tato Cannon  YOB: 1940  MRN: [id number]   Acct: [id number]  Admit Date:  2/2/2024  Primary Cardiologist: Kavin العراقي MD    Note per dr duke \"REASON FOR CONSULTATION:  Congestive heart failure and atrial  fibrillation.     REQUESTING PROVIDER:  Hospitalist Service.     HISTORY OF PRESENT ILLNESS:  A pleasant 83-year-old gentleman with  history of apparently diastolic dysfunction, atrial fibrillation, and  CHF.  The patient apparently has had rate-controlled AFib and failed  cardioversion in the past.  He has been maintained on medical therapy;  however, he did not take his Eliquis because he was worried about  bleeding risk and, therefore, he has not been taking it.  I am not  certain about his compliance with the rest of the medications.  He comes  in with lower extremity edema, shortness of breath, and what seems to be  fluid overload.  He was found to be in AFib, rapid ventricular response.  We are consulted to assist in the management of the patient.\"    Subjective (Events in last 24 hours): pt awake and alert.  NAD. No cp or sob.  Edema improving, not resolved  On heparin gtt  On cdz at 7.5mg/hr    On RA  Net I/o -3.2 L    Objective:   /78   Pulse 76   Temp 97.5 °F (36.4 °C) (Oral)   Resp 15   Ht 1.829 m (6')   Wt 87.6 kg (193 lb 2 oz)   SpO2 97%   BMI 26.19 kg/m²        TELEMETRY: afib cvr 93    Physical Exam:  General Appearance: alert and oriented to person, place and time, in no acute distress  Cardiovascular: irregularly irregulr  Pulmonary/Chest: clear to auscultation bilaterally- no wheezes, rales or rhonchi, normal air movement, no respiratory distress  Abdomen: soft, non-tender, non-distended, normal bowel sounds, no masses Extremities: +1 ble edema, pulse   Skin: warm and dry, no rash or erythema  Head: normocephalic and atraumatic  Eyes: pupils equal, round, and reactive to light  Neck: supple and non-tender without mass, no 
Nutrition Education    Received c/s -   Written materials regarding low sodium diet would be very helpful as the patient is very hard of hearing     Educated on low sodium diet, fluid restriction  Learners: Patient  Readiness: Acceptance  Method: Explanation, Handout, and Teachback  Response: Verbalizes Understanding  Contact name and number provided.    Pt. Reports \"I have a paper just like that from last year\".  States avoids added salt but admits to consuming canned soups, lunch meats.  States does frozen foods - such as vegetables.  Admits to drinking a lot of fluid.    Lilia Murphy, RD, LD  Contact Number: [phone]      
Patient asked to walk with PCA during shift but refused to do so will try to get him up during night shift.  
Pt given discharge instructions, alisa at bedtime. Pt awake, alert, vss  at time of discharge.   
Was able to walk patient at end of shift patient HR got up to 104 but mostly stayed in the 90s   
02/06/2024 05:51 AM       CMP:    Lab Results   Component Value Date/Time     02/06/2024 05:51 AM    K 3.7 02/06/2024 05:51 AM    K 3.7 02/03/2024 06:08 AM    CL 98 02/06/2024 05:51 AM    CO2 28 02/06/2024 05:51 AM    BUN 25 02/06/2024 05:51 AM    CREATININE 1.0 02/06/2024 05:51 AM    LABGLOM >60 02/06/2024 05:51 AM    LABGLOM 45 07/27/2023 12:00 AM    GLUCOSE 100 02/06/2024 05:51 AM    CALCIUM 8.1 02/06/2024 05:51 AM       Hepatic Function Panel:    Lab Results   Component Value Date/Time    ALKPHOS 250 02/03/2024 06:08 AM     02/03/2024 06:08 AM    AST 53 02/03/2024 06:08 AM    PROT 6.1 02/03/2024 06:08 AM    BILITOT 0.9 02/03/2024 06:08 AM    BILIDIR 0.5 02/03/2024 06:08 AM    LABALBU 2.9 02/03/2024 06:08 AM       Magnesium:    Lab Results   Component Value Date/Time    MG 1.8 02/06/2024 05:51 AM       PT/INR:    Lab Results   Component Value Date/Time    INR 1.19 02/02/2024 02:00 PM       HgBA1c:  No results found for: \"LABA1C\"    FLP:    Lab Results   Component Value Date/Time    TRIG 47 05/17/2023 08:19 AM    HDL 54 05/17/2023 08:19 AM    LDLCALC 46 05/17/2023 08:19 AM       TSH:    Lab Results   Component Value Date/Time    TSH 7.350 02/02/2024 02:00 PM         Assessment:    Acute on chronic HFmrEF  Ef 40-45 per TTE 2/3/24, ef 50 per TTE 5/2023 - likely tachy induced with recent normal ischemic w/up   Persistent Afib rvr - cvr today   Failed cardioversion before, rate control optimization   No significant CAD per cath 5/2023  Electrolyte abnormality - low K and mag  HTN  Elevated LFTs - attending following   Noncompliance with meds    Discussed with dr duke  Plan:    Keep mag >2 and k >4, replace prn  Daily I/o and weights  2 liter fluid restriction and 2gm sodium diet  Cont diureisis  Daily BMP  Elevate legs/kulwinder hose  Cont BB - increase to 100 bid  Add digoxin 250 daily  Cont heparin gtt - ok to transition to eliquis from cardiology standpoint  Resume eliquis upon dc - pt understands risk of 
05:51 AM       CMP:    Lab Results   Component Value Date/Time     02/07/2024 03:56 PM    K 3.8 02/07/2024 03:56 PM    K 3.7 02/03/2024 06:08 AM    CL 95 02/07/2024 03:56 PM    CO2 28 02/07/2024 03:56 PM    BUN 23 02/07/2024 03:56 PM    CREATININE 1.0 02/07/2024 03:56 PM    LABGLOM >60 02/07/2024 03:56 PM    LABGLOM 45 07/27/2023 12:00 AM    GLUCOSE 100 02/07/2024 03:56 PM    CALCIUM 8.3 02/07/2024 03:56 PM       Hepatic Function Panel:    Lab Results   Component Value Date/Time    ALKPHOS 221 02/07/2024 06:22 AM    ALT 89 02/07/2024 06:22 AM    AST 51 02/07/2024 06:22 AM    PROT 5.6 02/07/2024 06:22 AM    BILITOT 1.0 02/07/2024 06:22 AM    BILIDIR 0.5 02/07/2024 06:22 AM    LABALBU 2.8 02/07/2024 06:22 AM       Magnesium:    Lab Results   Component Value Date/Time    MG 1.7 02/07/2024 06:22 AM       PT/INR:    Lab Results   Component Value Date/Time    INR 1.19 02/02/2024 02:00 PM       HgBA1c:  No results found for: \"LABA1C\"    FLP:    Lab Results   Component Value Date/Time    TRIG 47 05/17/2023 08:19 AM    HDL 54 05/17/2023 08:19 AM    LDLCALC 46 05/17/2023 08:19 AM       TSH:    Lab Results   Component Value Date/Time    TSH 7.350 02/02/2024 02:00 PM         Assessment:    Acute on chronic HFmrEF - compensated  Ef 40-45 per TTE 2/3/24, ef 50 per TTE 5/2023 - likely tachy induced with recent normal ischemic w/up   Persistent Afib rvr - cvr today   Failed cardioversion before, rate control optimization   No significant CAD per cath 5/2023  Electrolyte abnormality - low K and mag  HTN  Elevated LFTs - attending following   Noncompliance with meds    Discussed with dr duke  Plan:    Keep mag >2 and k >4, replace prn  Daily I/o and weights  2 liter fluid restriction and 2gm sodium diet  Cont diureisis  Daily BMP  Elevate legs/kulwinder hose  Cont BB/dig/arb/bumex  cont eliquis upon dc - pt understands risk of bleeding and accepts risk to reduce risk of embolic phenomenon   Consider GI evaluation  Consider watchman 
bilaterally.  Cardiac: Irregular rhythm, rate is tachycardic.  No murmur, rubs, or gallops.  Abdomen: Nondistended, soft, no tenderness to palpation, guarding, rigidity.  Bowel sounds normoactive.  Extremities:  No clubbing or cyanosis.  3+ pitting edema on bilateral lower extremities.  Vasculature: capillary refill < 3 seconds.  Lower extremity pulses 2+ bilaterally  Skin:  Warm and dry.  Psych: Mood normal.  Affect appropriate.  Neurologic: Very hard of hearing.  Alert and oriented x4.  No cranial nerve deficits.  No extremity weakness.    Data: (All radiographs, tracings, PFTs, and imaging are personally viewed and interpreted unless otherwise noted)  Labs:   Recent Labs     02/02/24  1400 02/03/24  0608 02/04/24  0609   WBC 9.1 7.9 9.4   HGB 12.3* 11.8* 11.2*   HCT 38.8* 40.3* 35.4*    204 218       Recent Labs     02/02/24  1400 02/03/24  0608 02/04/24  0609    140 138   K 4.1 3.7 3.2*    105 100   CO2 21* 22* 24   BUN 36* 31* 25*   CREATININE 1.3* 1.1 1.1   CALCIUM 9.0 8.6 8.4*       Recent Labs     02/02/24  1400 02/03/24  0608   AST 68* 53*   * 162*   BILIDIR 0.4* 0.5*   BILITOT 0.8 0.9   ALKPHOS 282* 250*       Recent Labs     02/02/24  1400   INR 1.19*       No results for input(s): \"CKTOTAL\", \"TROPONINI\" in the last 72 hours.  Urinalysis:   Lab Results   Component Value Date/Time    NITRU NEGATIVE 02/02/2024 03:00 PM    BLOODU NEGATIVE 02/02/2024 03:00 PM    GLUCOSEU NEGATIVE 02/02/2024 03:00 PM       EKG: Atrial fibrillation with rapid ventricular response and a ventricular rate of 155 bpm.    Radiology:  XR CHEST PORTABLE    Result Date: 2/2/2024  PROCEDURE: XR CHEST PORTABLE CLINICAL INFORMATION: 83-year-old male with shortness of breath and fatigue. COMPARISON: No prior study. TECHNIQUE: AP upright view of the chest was obtained. FINDINGS: There is cardiomegaly and mild pulmonary vascular congestion. There is no significant pleural effusion or pneumothorax. Visualized

## 2024-02-08 NOTE — PLAN OF CARE
Problem: Discharge Planning  Goal: Discharge to home or other facility with appropriate resources  2/5/2024 1039 by Margot Hawkins RN  Outcome: Progressing     Problem: Safety - Adult  Goal: Free from fall injury  2/5/2024 1039 by Margot Hawkins RN  Outcome: Progressing     Problem: Cardiovascular - Adult  Goal: Maintains optimal cardiac output and hemodynamic stability  2/5/2024 1039 by Margot Hawkins RN  Outcome: Progressing     Problem: Cardiovascular - Adult  Goal: Absence of cardiac dysrhythmias or at baseline  2/5/2024 1039 by Margot Hawkins RN  Outcome: Progressing     Problem: Metabolic/Fluid and Electrolytes - Adult  Goal: Electrolytes maintained within normal limits  2/5/2024 1039 by Margot Hawkins RN  Outcome: Progressing     Problem: Metabolic/Fluid and Electrolytes - Adult  Goal: Hemodynamic stability and optimal renal function maintained  2/5/2024 1039 by Margot Hawkins RN  Outcome: Progressing     Problem: Metabolic/Fluid and Electrolytes - Adult  Goal: Glucose maintained within prescribed range  2/5/2024 1039 by Margot Hawkins RN  Outcome: Progressing     Problem: Hematologic - Adult  Goal: Maintains hematologic stability  2/5/2024 1039 by Margot Hawkins RN  Outcome: Progressing     Problem: Chronic Conditions and Co-morbidities  Goal: Patient's chronic conditions and co-morbidity symptoms are monitored and maintained or improved  2/5/2024 1039 by Margot Hawkins RN  Outcome: Progressing     Problem: Skin/Tissue Integrity  Goal: Absence of new skin breakdown  Description: 1.  Monitor for areas of redness and/or skin breakdown  2.  Assess vascular access sites hourly  3.  Every 4-6 hours minimum:  Change oxygen saturation probe site  4.  Every 4-6 hours:  If on nasal continuous positive airway pressure, respiratory therapy assess nares and determine need for appliance change or resting period.  2/5/2024 1039 by Margot Hawkins RN  Outcome: Progressing    Care plan reviewed with patient.  Patient 
  Problem: Discharge Planning  Goal: Discharge to home or other facility with appropriate resources  Outcome: Progressing     Problem: Safety - Adult  Goal: Free from fall injury  Outcome: Progressing     Problem: Cardiovascular - Adult  Goal: Maintains optimal cardiac output and hemodynamic stability  Outcome: Progressing  Goal: Absence of cardiac dysrhythmias or at baseline  Outcome: Progressing     Problem: Metabolic/Fluid and Electrolytes - Adult  Goal: Electrolytes maintained within normal limits  Outcome: Progressing  Goal: Hemodynamic stability and optimal renal function maintained  Outcome: Progressing  Goal: Glucose maintained within prescribed range  Outcome: Progressing     Problem: Hematologic - Adult  Goal: Maintains hematologic stability  Outcome: Progressing     
  Problem: Discharge Planning  Goal: Discharge to home or other facility with appropriate resources  Outcome: Progressing     Problem: Safety - Adult  Goal: Free from fall injury  Outcome: Progressing     Problem: Cardiovascular - Adult  Goal: Maintains optimal cardiac output and hemodynamic stability  Outcome: Progressing  Goal: Absence of cardiac dysrhythmias or at baseline  Outcome: Progressing     Problem: Metabolic/Fluid and Electrolytes - Adult  Goal: Electrolytes maintained within normal limits  Outcome: Progressing  Goal: Hemodynamic stability and optimal renal function maintained  Outcome: Progressing  Goal: Glucose maintained within prescribed range  Outcome: Progressing     Problem: Hematologic - Adult  Goal: Maintains hematologic stability  Outcome: Progressing     Problem: Chronic Conditions and Co-morbidities  Goal: Patient's chronic conditions and co-morbidity symptoms are monitored and maintained or improved  Outcome: Progressing     Problem: Skin/Tissue Integrity  Goal: Absence of new skin breakdown  Description: 1.  Monitor for areas of redness and/or skin breakdown  2.  Assess vascular access sites hourly  3.  Every 4-6 hours minimum:  Change oxygen saturation probe site  4.  Every 4-6 hours:  If on nasal continuous positive airway pressure, respiratory therapy assess nares and determine need for appliance change or resting period.  Outcome: Progressing     Problem: Pain  Goal: Verbalizes/displays adequate comfort level or baseline comfort level  Outcome: Progressing     
need for appliance change or resting period.  Outcome: Progressing  Note: Absence of new skin integrity issues on my shift. Will continue to assess.       Problem: Pain  Goal: Verbalizes/displays adequate comfort level or baseline comfort level  Outcome: Progressing  Note: Pt denies pain on my shift. Non pharmaceutical interventions like ice and repositioning offered before pain medications. Will continue to assess.    Pt verbalizes understanding of care plan. Pt contributes to goal settings.

## 2024-02-09 ENCOUNTER — CARE COORDINATION (OUTPATIENT)
Dept: CASE MANAGEMENT | Age: 84
End: 2024-02-09

## 2024-02-09 NOTE — CARE COORDINATION
Care Transitions Initial Outreach Attempt-1st attempt    Call within 2 business days of discharge: Yes   Attempted initial 24 hour transitional call to patient.  Left HIPPA compliant VM to return call directly to [phone].    Patient: Tato Cannon Patient : 1940 MRN: [id number]    Last Discharge Facility       Date Complaint Diagnosis Description Type Department Provider    24 Leg Swelling; Shortness of Breath Atrial fibrillation with rapid ventricular response (HCC) ... ED to Hosp-Admission (Discharged) (ADMITTED) [facility] Janet Fong MD; Claus Gaines MD;...                Noted following upcoming appointments from discharge chart review:   [facility] follow up appointment(s):   Future Appointments   Date Time Provider Department Center   2024 11:00 AM Elizabeth Hussein, ANASTASIA - CNP [facility] [place]   2024  1:30 PM Mortimer, Connor, PA-C [facility] [place]   10/11/2024  1:00 PM Kavin العراقي MD [facility] [place]     Non-BS  follow up appointment(s): na

## 2024-02-12 ENCOUNTER — OFFICE VISIT (OUTPATIENT)
Dept: CARDIOLOGY CLINIC | Age: 84
End: 2024-02-12
Payer: MEDICARE

## 2024-02-12 ENCOUNTER — TELEPHONE (OUTPATIENT)
Dept: CARDIOLOGY CLINIC | Age: 84
End: 2024-02-12

## 2024-02-12 ENCOUNTER — CARE COORDINATION (OUTPATIENT)
Dept: CASE MANAGEMENT | Age: 84
End: 2024-02-12

## 2024-02-12 VITALS
DIASTOLIC BLOOD PRESSURE: 70 MMHG | BODY MASS INDEX: 22.75 KG/M2 | HEIGHT: 72 IN | SYSTOLIC BLOOD PRESSURE: 136 MMHG | HEART RATE: 73 BPM | WEIGHT: 168 LBS | OXYGEN SATURATION: 96 %

## 2024-02-12 DIAGNOSIS — I48.19 PERSISTENT ATRIAL FIBRILLATION (HCC): ICD-10-CM

## 2024-02-12 DIAGNOSIS — I50.22 CHF NYHA CLASS II, CHRONIC, SYSTOLIC (HCC): Primary | ICD-10-CM

## 2024-02-12 DIAGNOSIS — I10 ESSENTIAL HYPERTENSION: ICD-10-CM

## 2024-02-12 DIAGNOSIS — I50.32 CHF NYHA CLASS II, CHRONIC, DIASTOLIC (HCC): Primary | ICD-10-CM

## 2024-02-12 LAB
ANION GAP SERPL CALC-SCNC: 11 MEQ/L (ref 8–16)
BUN SERPL-MCNC: 35 MG/DL (ref 7–22)
CALCIUM SERPL-MCNC: 9.1 MG/DL (ref 8.5–10.5)
CHLORIDE SERPL-SCNC: 104 MEQ/L (ref 98–111)
CO2 SERPL-SCNC: 26 MEQ/L (ref 23–33)
CREAT SERPL-MCNC: 0.9 MG/DL (ref 0.4–1.2)
DIGOXIN SERPL-MCNC: 0.9 NG/ML (ref 0.5–2)
GFR SERPL CREATININE-BSD FRML MDRD: > 60 ML/MIN/1.73M2
GLUCOSE SERPL-MCNC: 105 MG/DL (ref 70–108)
MAGNESIUM SERPL-MCNC: 2 MG/DL (ref 1.6–2.4)
POTASSIUM SERPL-SCNC: 4.1 MEQ/L (ref 3.5–5.2)
SODIUM SERPL-SCNC: 141 MEQ/L (ref 135–145)

## 2024-02-12 PROCEDURE — 36415 COLL VENOUS BLD VENIPUNCTURE: CPT | Performed by: NURSE PRACTITIONER

## 2024-02-12 PROCEDURE — 3078F DIAST BP <80 MM HG: CPT | Performed by: NURSE PRACTITIONER

## 2024-02-12 PROCEDURE — 1123F ACP DISCUSS/DSCN MKR DOCD: CPT | Performed by: NURSE PRACTITIONER

## 2024-02-12 PROCEDURE — 3075F SYST BP GE 130 - 139MM HG: CPT | Performed by: NURSE PRACTITIONER

## 2024-02-12 PROCEDURE — 99214 OFFICE O/P EST MOD 30 MIN: CPT | Performed by: NURSE PRACTITIONER

## 2024-02-12 NOTE — PATIENT INSTRUCTIONS
You may receive a survey regarding the care you received during your visit.  Your input is valuable to us.  We encourage you to complete and return your survey.  We hope you will choose us in the future for your healthcare needs.    Your nurses today were Nancy.  Office hours:   Mon-Thurs 8-4:30  Friday 8-12  Phone: [phone]    Continue:  Continue current medications  Daily weights and record  Fluid restriction of 2 Liters per day  Limit sodium in diet to around 6826-3468 mg/day  Monitor BP  Activity as tolerated     Call the Heart Failure Clinic for any of the following symptoms:   Weight gain of 2-3 pounds in 1 day or 5 pounds in 1 week  Increased shortness of breath  Shortness of breath while laying down  Cough  Chest pain  Swelling in feet, ankles or legs  Bloating in abdomen  Fatigue

## 2024-02-12 NOTE — PROGRESS NOTES
Heart Failure Clinic       Visit Date: 2/12/2024  Cardiologist:  Dr. العراقي  Primary Care Physician: Mao Lake MD  Referred by: hospital    Tato Cnanon is a 83 y.o. male who presents today for:  Chief Complaint   Patient presents with    Congestive Heart Failure       HPI:   Tato Cannon is a 83 y.o. male who presents to the office for a follow up patient visit in the heart failure clinic.  Accompanied by no one    TYPE HF: HFrEF 50% >> 40-45%  Cause: no  Valves:  mild MR, TR  Device: no  HX: AFib (new 5/2023 - Eliquis), HTN, HLD    Dry Wt:  175    Hospitalization:  1/2024 -  Acute systolic dysfunction and other abnormalities could be related to chronic A-fib induced tachycardia, in the setting of likely noncompliance with meds. Discharged on Bumex 2 mg daily.    BB increased and Dig added.     8/2023 - 172#  Feeling good overall.  No fluid on exam  Much confusion on meds - apparently Eliquis and toprol got stopped errorenously d/t pharmacy changes.  Pt also very confused if taking Aldactone.        TODAY 2/2024 - 168#  Feeling back to baseline.    Very [place] - states he's taking pills, \"he thinks\" correctly  States lost more wt since been home - swelling much improved.     Patient has:  Chest Pain: no  SOB: HARDY  Orthopnea/PND: no  BRIANNE: no  Edema: better  Fatigue: no  Abdominal bloating: no  Cough: no  Appetite: good  Home weight: not check   Home blood pressure: not check    Past Medical History:   Diagnosis Date    Hyperlipidemia     Hypertension      No past surgical history on file.  No family history on file.  Social History     Tobacco Use    Smoking status: Former    Smokeless tobacco: Not on file   Substance Use Topics    Alcohol use: Yes     Current Outpatient Medications   Medication Sig Dispense Refill    apixaban (ELIQUIS) 5 MG TABS tablet Take 1 tablet by mouth 2 times daily 60 tablet 0    valsartan (DIOVAN) 40 MG tablet Take 0.5 tablets by mouth in the morning and at bedtime

## 2024-02-12 NOTE — CARE COORDINATION
Care Transitions Initial Outreach Attempt-2nd attempt    Call within 2 business days of discharge: Yes   Attempted initial 24 hour transitional call to patient.  Left HIPPA compliant VM to return call directly to [phone].  If no return call, CTN will sign off-2nd attempt.  Unable to reach letter not sent, non [facility] PCP.    Patient: Tato Cannon Patient : 1940 MRN: [id number]    Last Discharge Facility       Date Complaint Diagnosis Description Type Department Provider    24 Leg Swelling; Shortness of Breath Atrial fibrillation with rapid ventricular response (HCC) ... ED to Hosp-Admission (Discharged) (ADMITTED) [facility] Janet Fong MD; Claus Gaines MD;...              Noted following upcoming appointments from discharge chart review:   [facility] follow up appointment(s):   Future Appointments   Date Time Provider Department Center   3/19/2024  1:00 PM Elizabeth Hussein, APRN - CNP [facility] [place]   10/11/2024  1:00 PM Kavin العراقي MD N [facility] Heart [facility] [place]     Non-BS  follow up appointment(s): na

## 2024-02-13 NOTE — TELEPHONE ENCOUNTER
Labs reviewed - BUN little elevated, everything else stable.  Started on Bumex recently  Continue same at this time.  Repeat BMP in 10-14 days.  Order placed.   Fax to .

## 2024-02-27 ENCOUNTER — TELEPHONE (OUTPATIENT)
Dept: CARDIOLOGY CLINIC | Age: 84
End: 2024-02-27

## 2024-02-27 NOTE — TELEPHONE ENCOUNTER
[facility] CALLED TO NOTIFY BILL SHE WAS UNABLE TO REACH PATIENT TO SET UP VISIT.  NURSE SPOKE WITH SON SEVERAL DAYS AGO BUT HE NEVER RETURNED CALL TO SCHEDULE APPT.

## 2024-03-19 ENCOUNTER — OFFICE VISIT (OUTPATIENT)
Dept: CARDIOLOGY CLINIC | Age: 84
End: 2024-03-19
Payer: MEDICARE

## 2024-03-19 ENCOUNTER — TELEPHONE (OUTPATIENT)
Dept: CARDIOLOGY CLINIC | Age: 84
End: 2024-03-19

## 2024-03-19 VITALS
HEART RATE: 88 BPM | SYSTOLIC BLOOD PRESSURE: 132 MMHG | DIASTOLIC BLOOD PRESSURE: 70 MMHG | HEIGHT: 72 IN | OXYGEN SATURATION: 97 % | RESPIRATION RATE: 18 BRPM | WEIGHT: 173.8 LBS | BODY MASS INDEX: 23.54 KG/M2

## 2024-03-19 DIAGNOSIS — I10 ESSENTIAL HYPERTENSION: ICD-10-CM

## 2024-03-19 DIAGNOSIS — I48.91 ATRIAL FIBRILLATION, UNSPECIFIED TYPE (HCC): ICD-10-CM

## 2024-03-19 DIAGNOSIS — I50.22 CHF NYHA CLASS II, CHRONIC, SYSTOLIC (HCC): Primary | ICD-10-CM

## 2024-03-19 PROCEDURE — 3075F SYST BP GE 130 - 139MM HG: CPT | Performed by: NURSE PRACTITIONER

## 2024-03-19 PROCEDURE — 99214 OFFICE O/P EST MOD 30 MIN: CPT | Performed by: NURSE PRACTITIONER

## 2024-03-19 PROCEDURE — 1123F ACP DISCUSS/DSCN MKR DOCD: CPT | Performed by: NURSE PRACTITIONER

## 2024-03-19 PROCEDURE — 3078F DIAST BP <80 MM HG: CPT | Performed by: NURSE PRACTITIONER

## 2024-03-19 RX ORDER — VALSARTAN 40 MG/1
40 TABLET ORAL DAILY
Qty: 30 TABLET | Refills: 5 | Status: SHIPPED | OUTPATIENT
Start: 2024-03-19

## 2024-03-19 RX ORDER — DIGOXIN 125 MCG
125 TABLET ORAL DAILY
Qty: 30 TABLET | Refills: 0 | Status: SHIPPED | OUTPATIENT
Start: 2024-03-19

## 2024-03-19 RX ORDER — SPIRONOLACTONE 25 MG/1
25 TABLET ORAL DAILY
Qty: 30 TABLET | Refills: 5 | Status: SHIPPED | OUTPATIENT
Start: 2024-03-19

## 2024-03-19 RX ORDER — METOPROLOL SUCCINATE 100 MG/1
100 TABLET, EXTENDED RELEASE ORAL DAILY
Qty: 30 TABLET | Refills: 5 | Status: SHIPPED | OUTPATIENT
Start: 2024-03-19

## 2024-03-19 RX ORDER — BUMETANIDE 0.5 MG/1
0.5 TABLET ORAL DAILY
Qty: 30 TABLET | Refills: 5 | Status: SHIPPED | OUTPATIENT
Start: 2024-03-19

## 2024-03-19 ASSESSMENT — ENCOUNTER SYMPTOMS
SHORTNESS OF BREATH: 0
COUGH: 0
ABDOMINAL DISTENTION: 0

## 2024-03-19 NOTE — PATIENT INSTRUCTIONS
You may receive a survey regarding the care you received during your visit.  Your input is valuable to us.  We encourage you to complete and return your survey.  We hope you will choose us in the future for your healthcare needs.    Your nurses today were Nancy.  Office hours:   Mon-Thurs 8-4:30  Friday 8-12  Phone: [phone]    Continue:  Continue current medications  Daily weights and record  Fluid restriction of 2 Liters per day  Limit sodium in diet to around 0141-2348 mg/day  Monitor BP  Activity as tolerated     Call the Heart Failure Clinic for any of the following symptoms:   Weight gain of 2-3 pounds in 1 day or 5 pounds in 1 week  Increased shortness of breath  Shortness of breath while laying down  Cough  Chest pain  Swelling in feet, ankles or legs  Bloating in abdomen  Fatigue

## 2024-03-19 NOTE — PROGRESS NOTES
9.1 02/12/2024 11:58 AM     Hepatic Function Panel:    Lab Results   Component Value Date/Time    ALKPHOS 221 02/07/2024 06:22 AM    ALT 89 02/07/2024 06:22 AM    AST 51 02/07/2024 06:22 AM    PROT 5.6 02/07/2024 06:22 AM    BILITOT 1.0 02/07/2024 06:22 AM    BILIDIR 0.5 02/07/2024 06:22 AM    LABALBU 2.8 02/07/2024 06:22 AM     Magnesium:    Lab Results   Component Value Date/Time    MG 2.0 02/12/2024 11:58 AM     PT/INR:    Lab Results   Component Value Date/Time    INR 1.19 02/02/2024 02:00 PM     Lipids:    Lab Results   Component Value Date/Time    TRIG 47 05/17/2023 08:19 AM    HDL 54 05/17/2023 08:19 AM    LDLCALC 46 05/17/2023 08:19 AM       ASSESSMENT AND PLAN:      Diagnosis Orders   1. CHF NYHA class II, chronic, systolic (HCC)  Basic Metabolic Panel    Digoxin Level      2. Essential hypertension        3. Atrial fibrillation, unspecified type (HCC)          Continue:  GDMT:   ACE/ARB/ARNI - Diovan 40 HALF tab/day - change to 40 mg/day   BB - Toprol 100 BID - taking 100/day - ok to continue   SGLT2 -  no  AA - no - add Aldactone 25/day  Diuretic - Bumex 2/day, Kcl 20/day - change to Bumex 0.5/day = stop Kcl   Vasodilator - no  Other - Eliquis, Dig     HFmrEF, NYHA II    ECHO 5/2023 - EF 50%    2/2024 - EF 40-45%, mod TR  On fair GDMT - several new meds  Difficulty keeping meds straight - only taking meds daily   Will change Toprol and Diovan to daily dosing.  Encouraged pt to take Eliquis BID  He brought up [facility] - apparently presented in house last month - he is not interested at this time after further discussion (had excessive bleeding w/ finger laceration).  Not taking Bumex - increasing swelling - will restart lower dose and low dose Aldactone to hopefully help w/ some compliance.    BMP in 10 days.    Afib/flutter - failed DCCV 5/2023.  Issues w/ rate control during recent admission.  Increased BB and added Dig.  Tolerating well today    Stable, appears fairly Euvolemic - some increasing

## 2024-03-19 NOTE — TELEPHONE ENCOUNTER
GUSTAVO MEDS FOR PATIENT    VALSARTAN 40 MG DAILY  TOPROL 50 MG DAILY  DIGOXIN 125 MCG  ATORVASTATIN 40 MG DAILY  FLOMAX 0.4 MG

## 2024-05-01 ENCOUNTER — TELEPHONE (OUTPATIENT)
Dept: CARDIOLOGY CLINIC | Age: 84
End: 2024-05-01

## 2024-05-01 NOTE — TELEPHONE ENCOUNTER
4/30 Lm for son to call office  4/23 LM for son to call office   4/16 [facility]  4/9 [facility]  4/5 LM with Son Silas on HIPAA, reminder call      5/1 spoke with son  Son stated the last time he talked to patient he said he was tired of having blood drawn and did not want to have any more done    Encouraged son to encourage patient to have labs done due to medications patient is taking    [facility] [address] [place] [address]   Jose Miguel Jennings 89: [phone] ? [phone] ? Fax: [phone] [facility] NOTE      Patient - [place]            - 2004        MRN -  [id number]   Acct # - [de-identified]      ADMISSION DATE: 2021  5:25 PM   TODAY'S DATE: 2021     ATTENDING PHYSICIAN: Sandoval Gan DO  CONSULTING PHYSICIAN: Dr. Stephan Guzmán:   Abdominal pain evaluation     HISTORY OF PRESENT ILLNESS:  The patient is a 14 y.o. female who presents with right lower quadrant pain, nause and vomiting earlier today. At approximately 2:30pm, patient had an acute onset of right lower quadrant pain after trying to have bowel movement. She describes the pain as throbbing and progressing to stabbing. Last bowel movement 2 days ago. Normal BM schedule is every other day. Patient started menstruating 6 months ago. Her cycles are irregular, last being 1 month ago. Patient has been seen for left sided flank pain in the past with no known etiology over the past 3 years. No previous surgical history. She takes and allergy pill as needed. On examination, patient denies abdominal pain. She states she is hungry and would like to eat. Past Medical History:        Diagnosis Date    Asthma     Eczema     Jaundice     Pneumonia 2013       Birth History:  Gestational Age: <None>   Type of Delivery:   section  Complications:  none    Past Surgical History:    History reviewed. No pertinent surgical history. Medications Prior to Admission:   Not in a hospital admission. Allergies:    Patient has no known allergies.     Social History:   Lives with father    Family History:       Problem Relation Age of Onset    High Blood Pressure Father     High Blood Pressure Paternal Uncle     High Blood Pressure Paternal Grandfather        REVIEW OF SYSTEMS:    11 systems reviewed and are negative except as noted in the HPI. PHYSICAL EXAM:    VITALS:  /64   Pulse 95   Temp 97.5 °F (36.4 °C) (Oral)   Resp 16   Ht 5' 5\" (1.651 m)   Wt 117 lb 4.6 oz (53.2 kg)   LMP 04/15/2021 (Within Days)   SpO2 96%   BMI 19.52 kg/m²   INTAKE/OUTPUT:  n/a    General:  alert and not in distress  HEENT:  Normocephalic, Atraumatic. Conjunctiva moist without icterus. Ears are symmetric. Nares are patent. Oral mucus membranes are moist.  Neck:  Supple. Cardiovascular:  Regular rate and rhythm. Normal S1, S2. No murmurs noted. Respiratory:  Respiration are easy and symmetric. Clear to auscultation bilaterally. Abdomen:  Soft, non tender, non distended. No organomegaly. No masses palpated. No rebound tenderness. Genitourinary: deferred  Anus:  Deferred. Neuro: Motor and sensory grossly intact. Extremities:  Warm, dry, and well perfused. Limbs without apparent deformity. Skin:  No rashes or lesions. DATA  Labs:  CBC with Differential:    Lab Results   Component Value Date    WBC 16.5 05/11/2021    RBC 4.91 05/11/2021    HGB 13.3 05/11/2021    HCT 40.9 05/11/2021     05/11/2021    MCV 83.3 05/11/2021    MCH 27.1 05/11/2021    MCHC 32.5 05/11/2021    RDW 12.1 05/11/2021    LYMPHOPCT 8 05/11/2021    MONOPCT 4 05/11/2021    BASOPCT 0 05/11/2021    MONOSABS 0.73 05/11/2021    LYMPHSABS 1.30 05/11/2021    EOSABS 0.04 05/11/2021    BASOSABS 0.04 05/11/2021    DIFFTYPE NOT REPORTED 05/11/2021     BMP:    Lab Results   Component Value Date     05/11/2021    K 4.5 05/11/2021     05/11/2021    CO2 22 05/11/2021    BUN 13 05/11/2021    LABALBU 4.5 05/11/2021    CREATININE 0.52 05/11/2021    CALCIUM 9.5 05/11/2021    GFRAA NOT REPORTED 05/11/2021    LABGLOM  05/11/2021     Pediatric GFR requires additional information. Refer to [facility] website for calculator.     GLUCOSE 90 05/11/2021     CRP <3.0    Imaging:    US PELVIS COMPLETE   Final Result   Limited exam as discussed

## 2024-05-24 ENCOUNTER — HOSPITAL ENCOUNTER (OUTPATIENT)
Age: 84
Discharge: HOME | End: 2024-05-24
Payer: MEDICARE

## 2024-05-24 DIAGNOSIS — I50.22 CHF NYHA CLASS II, CHRONIC, SYSTOLIC (HCC): ICD-10-CM

## 2024-05-24 LAB
ANION GAP SERPL CALC-SCNC: 15 MEQ/L (ref 8–16)
BUN SERPL-MCNC: 36 MG/DL (ref 7–22)
CALCIUM SERPL-MCNC: 8.9 MG/DL (ref 8.5–10.5)
CHLORIDE SERPL-SCNC: 102 MEQ/L (ref 98–111)
CO2 SERPL-SCNC: 20 MEQ/L (ref 23–33)
CREAT SERPL-MCNC: 1.3 MG/DL (ref 0.4–1.2)
DIGOXIN SERPL-MCNC: 0.3 NG/ML (ref 0.5–2)
GFR SERPL CREATININE-BSD FRML MDRD: 54 ML/MIN/1.73M2
GLUCOSE SERPL-MCNC: 120 MG/DL (ref 70–108)
POTASSIUM SERPL-SCNC: 4.6 MEQ/L (ref 3.5–5.2)
SODIUM SERPL-SCNC: 137 MEQ/L (ref 135–145)

## 2024-05-24 PROCEDURE — 80162 ASSAY OF DIGOXIN TOTAL: CPT

## 2024-05-24 PROCEDURE — 80048 BASIC METABOLIC PNL TOTAL CA: CPT

## 2024-05-24 PROCEDURE — 36415 COLL VENOUS BLD VENIPUNCTURE: CPT

## 2024-05-28 ENCOUNTER — TELEPHONE (OUTPATIENT)
Dept: CARDIOLOGY CLINIC | Age: 84
End: 2024-05-28

## 2024-05-28 NOTE — TELEPHONE ENCOUNTER
Labs reviewed - little dry.  Encourage him to drink extra bottle water daily  Has f/u in 5 weeks will repeat labs at [facility].

## 2024-05-30 NOTE — TELEPHONE ENCOUNTER
Notified arnie orozco on HIPAA. Verbalized understanding will give patient message. Will call if any questions.

## 2024-06-20 ENCOUNTER — HOSPITAL ENCOUNTER (INPATIENT)
Age: 84
LOS: 5 days | Discharge: LEFT BEFORE DISCHARGE | DRG: 602 | End: 2024-06-25
Attending: EMERGENCY MEDICINE | Admitting: STUDENT IN AN ORGANIZED HEALTH CARE EDUCATION/TRAINING PROGRAM
Payer: MEDICARE

## 2024-06-20 ENCOUNTER — APPOINTMENT (OUTPATIENT)
Dept: INTERVENTIONAL RADIOLOGY/VASCULAR | Age: 84
DRG: 602 | End: 2024-06-20
Payer: MEDICARE

## 2024-06-20 DIAGNOSIS — I48.91 ATRIAL FIBRILLATION WITH RAPID VENTRICULAR RESPONSE (HCC): ICD-10-CM

## 2024-06-20 DIAGNOSIS — R93.2 ABNORMAL LIVER ULTRASOUND: ICD-10-CM

## 2024-06-20 DIAGNOSIS — L03.119 CELLULITIS AND ABSCESS OF LEG: Primary | ICD-10-CM

## 2024-06-20 DIAGNOSIS — I50.9 DECOMPENSATED HEART FAILURE (HCC): ICD-10-CM

## 2024-06-20 DIAGNOSIS — I50.31 ACUTE DIASTOLIC CONGESTIVE HEART FAILURE (HCC): ICD-10-CM

## 2024-06-20 DIAGNOSIS — E88.09 HYPOALBUMINEMIA: ICD-10-CM

## 2024-06-20 DIAGNOSIS — R74.8 ELEVATED ALKALINE PHOSPHATASE LEVEL: ICD-10-CM

## 2024-06-20 DIAGNOSIS — I50.21 ACUTE CLINICAL SYSTOLIC HEART FAILURE (HCC): ICD-10-CM

## 2024-06-20 DIAGNOSIS — L02.419 CELLULITIS AND ABSCESS OF LEG: Primary | ICD-10-CM

## 2024-06-20 LAB
ALBUMIN SERPL BCG-MCNC: 2.8 G/DL (ref 3.5–5.1)
ALP SERPL-CCNC: 215 U/L (ref 38–126)
ALT SERPL W/O P-5'-P-CCNC: 22 U/L (ref 11–66)
ANION GAP SERPL CALC-SCNC: 14 MEQ/L (ref 8–16)
AST SERPL-CCNC: 32 U/L (ref 5–40)
BASOPHILS ABSOLUTE: 0 THOU/MM3 (ref 0–0.1)
BASOPHILS NFR BLD AUTO: 0.2 %
BILIRUB SERPL-MCNC: 1.5 MG/DL (ref 0.3–1.2)
BUN SERPL-MCNC: 31 MG/DL (ref 7–22)
CALCIUM SERPL-MCNC: 8.4 MG/DL (ref 8.5–10.5)
CHLORIDE SERPL-SCNC: 102 MEQ/L (ref 98–111)
CO2 SERPL-SCNC: 19 MEQ/L (ref 23–33)
CREAT SERPL-MCNC: 1.1 MG/DL (ref 0.4–1.2)
DEPRECATED RDW RBC AUTO: 54.9 FL (ref 35–45)
DIGOXIN SERPL-MCNC: < 0.3 NG/ML (ref 0.5–2)
EOSINOPHIL NFR BLD AUTO: 0.1 %
EOSINOPHILS ABSOLUTE: 0 THOU/MM3 (ref 0–0.4)
ERYTHROCYTE [DISTWIDTH] IN BLOOD BY AUTOMATED COUNT: 15.5 % (ref 11.5–14.5)
GFR SERPL CREATININE-BSD FRML MDRD: 67 ML/MIN/1.73M2
GLUCOSE SERPL-MCNC: 171 MG/DL (ref 70–108)
HCT VFR BLD AUTO: 35.8 % (ref 42–52)
HGB BLD-MCNC: 10.8 GM/DL (ref 14–18)
IMM GRANULOCYTES # BLD AUTO: 0.02 THOU/MM3 (ref 0–0.07)
IMM GRANULOCYTES NFR BLD AUTO: 0.2 %
LACTATE SERPL-SCNC: 2.2 MMOL/L (ref 0.5–2)
LACTIC ACID, SEPSIS: 1.3 MMOL/L (ref 0.5–1.9)
LACTIC ACID, SEPSIS: 1.3 MMOL/L (ref 0.5–1.9)
LACTIC ACID, SEPSIS: 1.6 MMOL/L (ref 0.5–1.9)
LYMPHOCYTES ABSOLUTE: 0.5 THOU/MM3 (ref 1–4.8)
LYMPHOCYTES NFR BLD AUTO: 5 %
MAGNESIUM SERPL-MCNC: 1.7 MG/DL (ref 1.6–2.4)
MCH RBC QN AUTO: 29.4 PG (ref 26–33)
MCHC RBC AUTO-ENTMCNC: 30.2 GM/DL (ref 32.2–35.5)
MCV RBC AUTO: 97.5 FL (ref 80–94)
MONOCYTES ABSOLUTE: 0.6 THOU/MM3 (ref 0.4–1.3)
MONOCYTES NFR BLD AUTO: 5.9 %
NEUTROPHILS ABSOLUTE: 9.4 THOU/MM3 (ref 1.8–7.7)
NEUTROPHILS NFR BLD AUTO: 88.6 %
NRBC BLD AUTO-RTO: 0 /100 WBC
OSMOLALITY SERPL CALC.SUM OF ELEC: 280.7 MOSMOL/KG (ref 275–300)
PLATELET # BLD AUTO: 159 THOU/MM3 (ref 130–400)
PMV BLD AUTO: 10.9 FL (ref 9.4–12.4)
POTASSIUM SERPL-SCNC: 4.5 MEQ/L (ref 3.5–5.2)
PROLACTIN SERPL-MCNC: 15.3 NG/ML
PROT SERPL-MCNC: 6.4 G/DL (ref 6.1–8)
RBC # BLD AUTO: 3.67 MILL/MM3 (ref 4.7–6.1)
SODIUM SERPL-SCNC: 135 MEQ/L (ref 135–145)
WBC # BLD AUTO: 10.6 THOU/MM3 (ref 4.8–10.8)

## 2024-06-20 PROCEDURE — 87040 BLOOD CULTURE FOR BACTERIA: CPT

## 2024-06-20 PROCEDURE — 96366 THER/PROPH/DIAG IV INF ADDON: CPT

## 2024-06-20 PROCEDURE — 6360000002 HC RX W HCPCS

## 2024-06-20 PROCEDURE — 2580000003 HC RX 258

## 2024-06-20 PROCEDURE — 80053 COMPREHEN METABOLIC PANEL: CPT

## 2024-06-20 PROCEDURE — 99285 EMERGENCY DEPT VISIT HI MDM: CPT

## 2024-06-20 PROCEDURE — 96367 TX/PROPH/DG ADDL SEQ IV INF: CPT

## 2024-06-20 PROCEDURE — 96365 THER/PROPH/DIAG IV INF INIT: CPT

## 2024-06-20 PROCEDURE — 80162 ASSAY OF DIGOXIN TOTAL: CPT

## 2024-06-20 PROCEDURE — 83735 ASSAY OF MAGNESIUM: CPT

## 2024-06-20 PROCEDURE — 85025 COMPLETE CBC W/AUTO DIFF WBC: CPT

## 2024-06-20 PROCEDURE — 93005 ELECTROCARDIOGRAM TRACING: CPT

## 2024-06-20 PROCEDURE — 84146 ASSAY OF PROLACTIN: CPT

## 2024-06-20 PROCEDURE — 36415 COLL VENOUS BLD VENIPUNCTURE: CPT

## 2024-06-20 PROCEDURE — 1200000003 HC TELEMETRY R&B

## 2024-06-20 PROCEDURE — 83605 ASSAY OF LACTIC ACID: CPT

## 2024-06-20 PROCEDURE — 93970 EXTREMITY STUDY: CPT

## 2024-06-20 RX ORDER — POLYETHYLENE GLYCOL 3350 17 G/17G
17 POWDER, FOR SOLUTION ORAL DAILY PRN
Status: DISCONTINUED | OUTPATIENT
Start: 2024-06-20 | End: 2024-06-26 | Stop reason: HOSPADM

## 2024-06-20 RX ORDER — TAMSULOSIN HYDROCHLORIDE 0.4 MG/1
0.4 CAPSULE ORAL DAILY
Status: DISCONTINUED | OUTPATIENT
Start: 2024-06-21 | End: 2024-06-26 | Stop reason: HOSPADM

## 2024-06-20 RX ORDER — 0.9 % SODIUM CHLORIDE 0.9 %
500 INTRAVENOUS SOLUTION INTRAVENOUS ONCE
Status: COMPLETED | OUTPATIENT
Start: 2024-06-20 | End: 2024-06-20

## 2024-06-20 RX ORDER — ONDANSETRON 2 MG/ML
4 INJECTION INTRAMUSCULAR; INTRAVENOUS EVERY 6 HOURS PRN
Status: DISCONTINUED | OUTPATIENT
Start: 2024-06-20 | End: 2024-06-26 | Stop reason: HOSPADM

## 2024-06-20 RX ORDER — LEVOTHYROXINE SODIUM 0.03 MG/1
25 TABLET ORAL DAILY
Status: DISCONTINUED | OUTPATIENT
Start: 2024-06-21 | End: 2024-06-24

## 2024-06-20 RX ORDER — ACETAMINOPHEN 325 MG/1
650 TABLET ORAL EVERY 6 HOURS PRN
Status: DISCONTINUED | OUTPATIENT
Start: 2024-06-20 | End: 2024-06-26 | Stop reason: HOSPADM

## 2024-06-20 RX ORDER — SODIUM CHLORIDE 0.9 % (FLUSH) 0.9 %
5-40 SYRINGE (ML) INJECTION EVERY 12 HOURS SCHEDULED
Status: DISCONTINUED | OUTPATIENT
Start: 2024-06-20 | End: 2024-06-26 | Stop reason: HOSPADM

## 2024-06-20 RX ORDER — DIGOXIN 125 MCG
125 TABLET ORAL DAILY
Status: DISCONTINUED | OUTPATIENT
Start: 2024-06-21 | End: 2024-06-25

## 2024-06-20 RX ORDER — SODIUM CHLORIDE 0.9 % (FLUSH) 0.9 %
5-40 SYRINGE (ML) INJECTION PRN
Status: DISCONTINUED | OUTPATIENT
Start: 2024-06-20 | End: 2024-06-26 | Stop reason: HOSPADM

## 2024-06-20 RX ORDER — ATORVASTATIN CALCIUM 40 MG/1
40 TABLET, FILM COATED ORAL DAILY
Status: DISCONTINUED | OUTPATIENT
Start: 2024-06-21 | End: 2024-06-26 | Stop reason: HOSPADM

## 2024-06-20 RX ORDER — POTASSIUM CHLORIDE 20 MEQ/1
40 TABLET, EXTENDED RELEASE ORAL PRN
Status: DISCONTINUED | OUTPATIENT
Start: 2024-06-20 | End: 2024-06-26 | Stop reason: HOSPADM

## 2024-06-20 RX ORDER — SPIRONOLACTONE 25 MG/1
25 TABLET ORAL DAILY
Status: DISCONTINUED | OUTPATIENT
Start: 2024-06-21 | End: 2024-06-26 | Stop reason: HOSPADM

## 2024-06-20 RX ORDER — BUMETANIDE 0.5 MG/1
0.5 TABLET ORAL DAILY
Status: DISCONTINUED | OUTPATIENT
Start: 2024-06-21 | End: 2024-06-21

## 2024-06-20 RX ORDER — ONDANSETRON 4 MG/1
4 TABLET, ORALLY DISINTEGRATING ORAL EVERY 8 HOURS PRN
Status: DISCONTINUED | OUTPATIENT
Start: 2024-06-20 | End: 2024-06-26 | Stop reason: HOSPADM

## 2024-06-20 RX ORDER — SODIUM CHLORIDE 9 MG/ML
INJECTION, SOLUTION INTRAVENOUS PRN
Status: DISCONTINUED | OUTPATIENT
Start: 2024-06-20 | End: 2024-06-26 | Stop reason: HOSPADM

## 2024-06-20 RX ORDER — POTASSIUM CHLORIDE 7.45 MG/ML
10 INJECTION INTRAVENOUS PRN
Status: DISCONTINUED | OUTPATIENT
Start: 2024-06-20 | End: 2024-06-26 | Stop reason: HOSPADM

## 2024-06-20 RX ORDER — METOPROLOL SUCCINATE 100 MG/1
100 TABLET, EXTENDED RELEASE ORAL DAILY
Status: DISCONTINUED | OUTPATIENT
Start: 2024-06-21 | End: 2024-06-21

## 2024-06-20 RX ORDER — VALSARTAN 40 MG/1
40 TABLET ORAL DAILY
Status: DISCONTINUED | OUTPATIENT
Start: 2024-06-21 | End: 2024-06-26 | Stop reason: HOSPADM

## 2024-06-20 RX ORDER — MAGNESIUM SULFATE IN WATER 40 MG/ML
2000 INJECTION, SOLUTION INTRAVENOUS PRN
Status: DISCONTINUED | OUTPATIENT
Start: 2024-06-20 | End: 2024-06-26 | Stop reason: HOSPADM

## 2024-06-20 RX ORDER — ACETAMINOPHEN 650 MG/1
650 SUPPOSITORY RECTAL EVERY 6 HOURS PRN
Status: DISCONTINUED | OUTPATIENT
Start: 2024-06-20 | End: 2024-06-26 | Stop reason: HOSPADM

## 2024-06-20 RX ADMIN — VANCOMYCIN HYDROCHLORIDE 2000 MG: 1 INJECTION, POWDER, LYOPHILIZED, FOR SOLUTION INTRAVENOUS at 18:27

## 2024-06-20 RX ADMIN — SODIUM CHLORIDE 500 ML: 9 INJECTION, SOLUTION INTRAVENOUS at 19:01

## 2024-06-20 RX ADMIN — PIPERACILLIN AND TAZOBACTAM 4500 MG: 4; .5 INJECTION, POWDER, LYOPHILIZED, FOR SOLUTION INTRAVENOUS at 17:52

## 2024-06-20 ASSESSMENT — PAIN - FUNCTIONAL ASSESSMENT
PAIN_FUNCTIONAL_ASSESSMENT: NONE - DENIES PAIN

## 2024-06-20 NOTE — ED NOTES
Pt resting in bed. Returned from US. Reports no pain. States there is some tingling at times. Resting comfortably in bed. Denies any needs. Will monitor

## 2024-06-20 NOTE — ED TRIAGE NOTES
Pt presents to the ED from home with complaints of bilateral leg swelling. Family is at bedside; states this has been going on for a while. Pt does take Bumex and spironolactone. States he has been asking to increase his dose however his provider will not. Pt's legs have bilateral pitting edema and both legs are seeping. Pt denies any pain at this time. EKG completed.

## 2024-06-20 NOTE — ED PROVIDER NOTES
[facility] EMERGENCY DEPARTMENT    EMERGENCY MEDICINE     Patient Name: Tato Cannon  MRN: [id number]  YOB: 1940  Date of Evaluation: 6/20/2024  Treating Resident Physician: Eloy Bryant DO  Supervising Physician: Dr. Claus Gaines MD    CHIEF COMPLAINT       Chief Complaint   Patient presents with    Leg Swelling       HISTORY OF PRESENT ILLNESS      History obtained from chart review and the patient.    Tato Cannon is a 84 y.o. male who presents to the emergency department from home by private vehicle for evaluation of leg swelling.   PMH of CHF, A-fib on Eliquis, hypothyroidism.  3 weeks ago patient had his left leg on chair got a cut placed a Band-Aid over it.  Since then he has been having these pool eyes that have been draining.  Patient decided come to the doctor today to get it checked out.  He is not on any recent antibiotics.  Denies any fevers or chills no chest pain or shortness of breath.  Reports nothing like this has happened before.  He has pain all about bilateral extremity paresthesias, sensitive to touch and feels warm.     Pertinent previous and/or external records reviewed: Non-contributory    PAST MEDICAL AND SURGICAL HISTORY     Past Medical History:   Diagnosis Date    Hyperlipidemia     Hypertension        History reviewed. No pertinent surgical history.    CURRENT MEDICATIONS     Previous Medications    ACETAMINOPHEN (TYLENOL) 325 MG TABLET    Take 2 tablets by mouth every 8 hours as needed for Pain    APIXABAN (ELIQUIS) 5 MG TABS TABLET    Take 1 tablet by mouth 2 times daily    ATORVASTATIN (LIPITOR) 40 MG TABLET    Take 1 tablet by mouth daily    BUMETANIDE (BUMEX) 0.5 MG TABLET    Take 1 tablet by mouth daily    DIGOXIN (LANOXIN) 125 MCG TABLET    Take 1 tablet by mouth daily    LEVOTHYROXINE (SYNTHROID) 25 MCG TABLET    Take 1 tablet by mouth Daily    METOPROLOL SUCCINATE (TOPROL XL) 100 MG EXTENDED RELEASE TABLET    Take 1 tablet by mouth daily    
MEDICATIONS:  Current Discharge Medication List          (Please note that portions of this note were completed with a voice recognition program.  Efforts were made to edit the dictations but occasionally words aremis-transcribed.)    MD Eder Bay Jian, MD  06/21/24 6909

## 2024-06-20 NOTE — ED NOTES
ED to inpatient nurses report      Chief Complaint:  Chief Complaint   Patient presents with    Leg Swelling     Present to ED from: home    MOA:     LOC: alert and orientated to name, place, date  Mobility: Requires assistance * 1  Oxygen Baseline: 96%    Current needs required: RA     Code Status:   Prior    What abnormal results were found and what did you give/do to treat them? cellulitus  Any procedures or intervention occur? Atb, labs, vascular US    Mental Status:  Level of Consciousness: Alert (0)    Psych Assessment:        Vitals:  Patient Vitals for the past 24 hrs:   BP Temp Temp src Pulse Resp SpO2 Height Weight   06/20/24 1824 103/61 -- -- (!) 109 20 -- -- --   06/20/24 1751 102/67 -- -- (!) 106 20 96 % -- --   06/20/24 1744 102/67 -- -- (!) 40 16 96 % -- --   06/20/24 1633 105/70 98 °F (36.7 °C) Oral (!) 123 18 98 % 1.829 m (6') 79.4 kg (175 lb)        LDAs:   Peripheral IV 06/20/24 Posterior;Right Forearm (Active)   Site Assessment Clean, dry & intact 06/20/24 1902   Line Status Infusing 06/20/24 1902   Phlebitis Assessment No symptoms 06/20/24 1754   Infiltration Assessment 0 06/20/24 1754   Dressing Status Clean, dry & intact 06/20/24 1634       Ambulatory Status:  No data recorded    Diagnosis:  DISPOSITION Decision To Admit 06/20/2024 07:09:50 PM   Final diagnoses:   Cellulitis and abscess of leg        Consults:  None     Pain Score:  Pain Assessment  Pain Assessment: None - Denies Pain    C-SSRS:   Risk of Suicide: No Risk    Sepsis Screening:       [place] Risk:       Swallow Screening        Preferred Language:   English      ALLERGIES     Coricidin hbp [dm-apap-cpm] and Sulfa antibiotics    SURGICAL HISTORY     History reviewed. No pertinent surgical history.    PAST MEDICAL HISTORY       Past Medical History:   Diagnosis Date    Hyperlipidemia     Hypertension            Electronically signed by Leonardo Dominguez RN on 6/20/2024 at 7:14 PM

## 2024-06-21 ENCOUNTER — APPOINTMENT (OUTPATIENT)
Age: 84
DRG: 602 | End: 2024-06-21
Payer: MEDICARE

## 2024-06-21 LAB
ALBUMIN SERPL BCG-MCNC: 2.5 G/DL (ref 3.5–5.1)
ALP SERPL-CCNC: 176 U/L (ref 38–126)
ALT SERPL W/O P-5'-P-CCNC: 17 U/L (ref 11–66)
ANION GAP SERPL CALC-SCNC: 11 MEQ/L (ref 8–16)
ANION GAP SERPL CALC-SCNC: 11 MEQ/L (ref 8–16)
AST SERPL-CCNC: 17 U/L (ref 5–40)
BILIRUB SERPL-MCNC: 1.3 MG/DL (ref 0.3–1.2)
BUN SERPL-MCNC: 28 MG/DL (ref 7–22)
BUN SERPL-MCNC: 29 MG/DL (ref 7–22)
CALCIUM SERPL-MCNC: 8.4 MG/DL (ref 8.5–10.5)
CALCIUM SERPL-MCNC: 8.4 MG/DL (ref 8.5–10.5)
CHLORIDE SERPL-SCNC: 105 MEQ/L (ref 98–111)
CHLORIDE SERPL-SCNC: 105 MEQ/L (ref 98–111)
CK SERPL-CCNC: 31 U/L (ref 55–170)
CO2 SERPL-SCNC: 23 MEQ/L (ref 23–33)
CO2 SERPL-SCNC: 23 MEQ/L (ref 23–33)
CREAT SERPL-MCNC: 1.3 MG/DL (ref 0.4–1.2)
CREAT SERPL-MCNC: 1.4 MG/DL (ref 0.4–1.2)
CRP SERPL-MCNC: 11.07 MG/DL (ref 0–1)
DEPRECATED RDW RBC AUTO: 54.6 FL (ref 35–45)
DIGOXIN SERPL-MCNC: < 0.3 NG/ML (ref 0.5–2)
ECHO AV CUSP MM: 2.4 CM
ECHO BSA: 2.01 M2
ECHO LA DIAMETER INDEX: 1.79 CM/M2
ECHO LA DIAMETER: 3.6 CM
ECHO LV EDV A2C: 72 ML
ECHO LV EDV A4C: 84 ML
ECHO LV EDV INDEX A4C: 42 ML/M2
ECHO LV EDV NDEX A2C: 36 ML/M2
ECHO LV EJECTION FRACTION A2C: 31 %
ECHO LV EJECTION FRACTION A4C: 33 %
ECHO LV EJECTION FRACTION BIPLANE: 31 % (ref 55–100)
ECHO LV ESV A2C: 50 ML
ECHO LV ESV A4C: 56 ML
ECHO LV ESV INDEX A2C: 25 ML/M2
ECHO LV ESV INDEX A4C: 28 ML/M2
ECHO LV FRACTIONAL SHORTENING: 15 % (ref 28–44)
ECHO LV INTERNAL DIMENSION DIASTOLE INDEX: 2.59 CM/M2
ECHO LV INTERNAL DIMENSION DIASTOLIC: 5.2 CM (ref 4.2–5.9)
ECHO LV INTERNAL DIMENSION SYSTOLIC INDEX: 2.19 CM/M2
ECHO LV INTERNAL DIMENSION SYSTOLIC: 4.4 CM
ECHO LV IVSD: 0.8 CM (ref 0.6–1)
ECHO LV MASS 2D: 145.2 G (ref 88–224)
ECHO LV MASS INDEX 2D: 72.2 G/M2 (ref 49–115)
ECHO LV POSTERIOR WALL DIASTOLIC: 0.8 CM (ref 0.6–1)
ECHO LV RELATIVE WALL THICKNESS RATIO: 0.31
ECHO RV INTERNAL DIMENSION: 2.7 CM
EKG Q-T INTERVAL: 324 MS
EKG QRS DURATION: 100 MS
EKG QTC CALCULATION (BAZETT): 455 MS
EKG R AXIS: -56 DEGREES
EKG T AXIS: 35 DEGREES
EKG VENTRICULAR RATE: 119 BPM
ERYTHROCYTE [DISTWIDTH] IN BLOOD BY AUTOMATED COUNT: 15.6 % (ref 11.5–14.5)
ERYTHROCYTE [SEDIMENTATION RATE] IN BLOOD BY WESTERGREN METHOD: 66 MM/HR (ref 0–10)
FERRITIN SERPL IA-MCNC: 75 NG/ML (ref 22–322)
GFR SERPL CREATININE-BSD FRML MDRD: 50 ML/MIN/1.73M2
GFR SERPL CREATININE-BSD FRML MDRD: 54 ML/MIN/1.73M2
GLUCOSE SERPL-MCNC: 144 MG/DL (ref 70–108)
GLUCOSE SERPL-MCNC: 78 MG/DL (ref 70–108)
HCT VFR BLD AUTO: 34.4 % (ref 42–52)
HGB BLD-MCNC: 10.4 GM/DL (ref 14–18)
INR PPP: 2.19 (ref 0.85–1.13)
IRON SATN MFR SERPL: 10 % (ref 20–50)
IRON SERPL-MCNC: 29 UG/DL (ref 65–195)
LACTATE SERPL-SCNC: 1.6 MMOL/L (ref 0.5–2)
MCH RBC QN AUTO: 29.5 PG (ref 26–33)
MCHC RBC AUTO-ENTMCNC: 30.2 GM/DL (ref 32.2–35.5)
MCV RBC AUTO: 97.5 FL (ref 80–94)
NT-PROBNP SERPL IA-MCNC: 7035 PG/ML (ref 0–449)
PLATELET # BLD AUTO: 149 THOU/MM3 (ref 130–400)
PMV BLD AUTO: 10.9 FL (ref 9.4–12.4)
POTASSIUM SERPL-SCNC: 4.1 MEQ/L (ref 3.5–5.2)
POTASSIUM SERPL-SCNC: 4.2 MEQ/L (ref 3.5–5.2)
PROT SERPL-MCNC: 5.9 G/DL (ref 6.1–8)
RBC # BLD AUTO: 3.53 MILL/MM3 (ref 4.7–6.1)
SODIUM SERPL-SCNC: 139 MEQ/L (ref 135–145)
SODIUM SERPL-SCNC: 139 MEQ/L (ref 135–145)
TIBC SERPL-MCNC: 296 UG/DL (ref 171–450)
WBC # BLD AUTO: 9.9 THOU/MM3 (ref 4.8–10.8)

## 2024-06-21 PROCEDURE — 87070 CULTURE OTHR SPECIMN AEROBIC: CPT

## 2024-06-21 PROCEDURE — 2580000003 HC RX 258: Performed by: STUDENT IN AN ORGANIZED HEALTH CARE EDUCATION/TRAINING PROGRAM

## 2024-06-21 PROCEDURE — 87077 CULTURE AEROBIC IDENTIFY: CPT

## 2024-06-21 PROCEDURE — 82728 ASSAY OF FERRITIN: CPT

## 2024-06-21 PROCEDURE — 2060000000 HC ICU INTERMEDIATE R&B

## 2024-06-21 PROCEDURE — 6360000002 HC RX W HCPCS: Performed by: STUDENT IN AN ORGANIZED HEALTH CARE EDUCATION/TRAINING PROGRAM

## 2024-06-21 PROCEDURE — 2500000003 HC RX 250 WO HCPCS: Performed by: STUDENT IN AN ORGANIZED HEALTH CARE EDUCATION/TRAINING PROGRAM

## 2024-06-21 PROCEDURE — 36415 COLL VENOUS BLD VENIPUNCTURE: CPT

## 2024-06-21 PROCEDURE — 87186 SC STD MICRODIL/AGAR DIL: CPT

## 2024-06-21 PROCEDURE — 87205 SMEAR GRAM STAIN: CPT

## 2024-06-21 PROCEDURE — 99223 1ST HOSP IP/OBS HIGH 75: CPT | Performed by: INTERNAL MEDICINE

## 2024-06-21 PROCEDURE — 87075 CULTR BACTERIA EXCEPT BLOOD: CPT

## 2024-06-21 PROCEDURE — 83880 ASSAY OF NATRIURETIC PEPTIDE: CPT

## 2024-06-21 PROCEDURE — 86140 C-REACTIVE PROTEIN: CPT

## 2024-06-21 PROCEDURE — 85651 RBC SED RATE NONAUTOMATED: CPT

## 2024-06-21 PROCEDURE — 93307 TTE W/O DOPPLER COMPLETE: CPT | Performed by: INTERNAL MEDICINE

## 2024-06-21 PROCEDURE — 6360000002 HC RX W HCPCS

## 2024-06-21 PROCEDURE — 6370000000 HC RX 637 (ALT 250 FOR IP): Performed by: STUDENT IN AN ORGANIZED HEALTH CARE EDUCATION/TRAINING PROGRAM

## 2024-06-21 PROCEDURE — 82550 ASSAY OF CK (CPK): CPT

## 2024-06-21 PROCEDURE — 93010 ELECTROCARDIOGRAM REPORT: CPT | Performed by: INTERNAL MEDICINE

## 2024-06-21 PROCEDURE — 83605 ASSAY OF LACTIC ACID: CPT

## 2024-06-21 PROCEDURE — 99223 1ST HOSP IP/OBS HIGH 75: CPT | Performed by: STUDENT IN AN ORGANIZED HEALTH CARE EDUCATION/TRAINING PROGRAM

## 2024-06-21 PROCEDURE — 83550 IRON BINDING TEST: CPT

## 2024-06-21 PROCEDURE — 83540 ASSAY OF IRON: CPT

## 2024-06-21 PROCEDURE — 85610 PROTHROMBIN TIME: CPT

## 2024-06-21 PROCEDURE — 85027 COMPLETE CBC AUTOMATED: CPT

## 2024-06-21 PROCEDURE — 80053 COMPREHEN METABOLIC PANEL: CPT

## 2024-06-21 PROCEDURE — 87147 CULTURE TYPE IMMUNOLOGIC: CPT

## 2024-06-21 PROCEDURE — 80162 ASSAY OF DIGOXIN TOTAL: CPT

## 2024-06-21 PROCEDURE — 93307 TTE W/O DOPPLER COMPLETE: CPT

## 2024-06-21 RX ORDER — 0.9 % SODIUM CHLORIDE 0.9 %
250 INTRAVENOUS SOLUTION INTRAVENOUS ONCE
Status: DISCONTINUED | OUTPATIENT
Start: 2024-06-21 | End: 2024-06-26 | Stop reason: HOSPADM

## 2024-06-21 RX ORDER — METOPROLOL SUCCINATE 100 MG/1
100 TABLET, EXTENDED RELEASE ORAL DAILY
Status: DISCONTINUED | OUTPATIENT
Start: 2024-06-21 | End: 2024-06-26 | Stop reason: HOSPADM

## 2024-06-21 RX ORDER — BUMETANIDE 0.5 MG/1
0.5 TABLET ORAL DAILY
Status: DISCONTINUED | OUTPATIENT
Start: 2024-06-22 | End: 2024-06-24

## 2024-06-21 RX ORDER — LANOLIN ALCOHOL/MO/W.PET/CERES
100 CREAM (GRAM) TOPICAL DAILY
Status: DISCONTINUED | OUTPATIENT
Start: 2024-06-21 | End: 2024-06-26 | Stop reason: HOSPADM

## 2024-06-21 RX ORDER — MAGNESIUM SULFATE IN WATER 40 MG/ML
2000 INJECTION, SOLUTION INTRAVENOUS ONCE
Status: COMPLETED | OUTPATIENT
Start: 2024-06-21 | End: 2024-06-21

## 2024-06-21 RX ORDER — FOLIC ACID 5 MG/ML
1 INJECTION, SOLUTION INTRAMUSCULAR; INTRAVENOUS; SUBCUTANEOUS DAILY
Status: DISCONTINUED | OUTPATIENT
Start: 2024-06-21 | End: 2024-06-26 | Stop reason: HOSPADM

## 2024-06-21 RX ORDER — METOPROLOL TARTRATE 1 MG/ML
5 INJECTION, SOLUTION INTRAVENOUS ONCE
Status: COMPLETED | OUTPATIENT
Start: 2024-06-21 | End: 2024-06-21

## 2024-06-21 RX ORDER — BUMETANIDE 0.25 MG/ML
1 INJECTION INTRAMUSCULAR; INTRAVENOUS 2 TIMES DAILY
Status: DISCONTINUED | OUTPATIENT
Start: 2024-06-21 | End: 2024-06-22

## 2024-06-21 RX ORDER — BUMETANIDE 0.25 MG/ML
1 INJECTION INTRAMUSCULAR; INTRAVENOUS ONCE
Status: DISCONTINUED | OUTPATIENT
Start: 2024-06-21 | End: 2024-06-21

## 2024-06-21 RX ADMIN — METOPROLOL SUCCINATE 100 MG: 100 TABLET, EXTENDED RELEASE ORAL at 07:44

## 2024-06-21 RX ADMIN — PIPERACILLIN AND TAZOBACTAM 3375 MG: 3; .375 INJECTION, POWDER, FOR SOLUTION INTRAVENOUS at 16:02

## 2024-06-21 RX ADMIN — SODIUM CHLORIDE, PRESERVATIVE FREE 10 ML: 5 INJECTION INTRAVENOUS at 00:34

## 2024-06-21 RX ADMIN — METOPROLOL TARTRATE 5 MG: 5 INJECTION INTRAVENOUS at 00:24

## 2024-06-21 RX ADMIN — SODIUM CHLORIDE, PRESERVATIVE FREE 10 ML: 5 INJECTION INTRAVENOUS at 09:10

## 2024-06-21 RX ADMIN — AMIODARONE HYDROCHLORIDE 0.5 MG/MIN: 1.8 INJECTION, SOLUTION INTRAVENOUS at 17:54

## 2024-06-21 RX ADMIN — ATORVASTATIN CALCIUM 40 MG: 40 TABLET, FILM COATED ORAL at 08:47

## 2024-06-21 RX ADMIN — SODIUM CHLORIDE, PRESERVATIVE FREE 10 ML: 5 INJECTION INTRAVENOUS at 20:10

## 2024-06-21 RX ADMIN — MAGNESIUM SULFATE HEPTAHYDRATE 2000 MG: 40 INJECTION, SOLUTION INTRAVENOUS at 12:56

## 2024-06-21 RX ADMIN — PIPERACILLIN AND TAZOBACTAM 3375 MG: 3; .375 INJECTION, POWDER, FOR SOLUTION INTRAVENOUS at 09:03

## 2024-06-21 RX ADMIN — Medication 1250 MG: at 20:10

## 2024-06-21 RX ADMIN — BUMETANIDE 1 MG: 0.25 INJECTION INTRAMUSCULAR; INTRAVENOUS at 17:48

## 2024-06-21 RX ADMIN — TAMSULOSIN HYDROCHLORIDE 0.4 MG: 0.4 CAPSULE ORAL at 08:47

## 2024-06-21 RX ADMIN — APIXABAN 5 MG: 5 TABLET, FILM COATED ORAL at 00:10

## 2024-06-21 RX ADMIN — DIGOXIN 125 MCG: 0.12 TABLET ORAL at 08:47

## 2024-06-21 RX ADMIN — BUMETANIDE 1 MG: 0.25 INJECTION INTRAMUSCULAR; INTRAVENOUS at 09:10

## 2024-06-21 RX ADMIN — APIXABAN 5 MG: 5 TABLET, FILM COATED ORAL at 20:10

## 2024-06-21 RX ADMIN — PIPERACILLIN AND TAZOBACTAM 3375 MG: 3; .375 INJECTION, POWDER, FOR SOLUTION INTRAVENOUS at 00:33

## 2024-06-21 RX ADMIN — APIXABAN 5 MG: 5 TABLET, FILM COATED ORAL at 08:47

## 2024-06-21 ASSESSMENT — PAIN SCALES - GENERAL: PAINLEVEL_OUTOF10: 0

## 2024-06-21 NOTE — ED NOTES
Notified JerardoK Tatyana who approved pt transport to [facility] in stable condition.

## 2024-06-21 NOTE — ED NOTES
ED to inpatient nurses report      Chief Complaint:  Chief Complaint   Patient presents with    Leg Swelling     Present to ED from: home    MOA:     LOC: alert and orientated to name, place, date  Mobility: Requires assistance * 1  Oxygen Baseline: ra    Current needs required: ra     Code Status:   Prior    What abnormal results were found and what did you give/do to treat them? Cellulitis   Any procedures or intervention occur? antibiotics    Mental Status:  Level of Consciousness: Alert (0)    Psych Assessment:        Vitals:  Patient Vitals for the past 24 hrs:   BP Temp Temp src Pulse Resp SpO2 Height Weight   06/20/24 1959 90/64 -- -- 99 19 96 % -- --   06/20/24 1824 103/61 -- -- (!) 109 20 -- -- --   06/20/24 1751 102/67 -- -- (!) 106 20 96 % -- --   06/20/24 1744 102/67 -- -- (!) 40 16 96 % -- --   06/20/24 1633 105/70 98 °F (36.7 °C) Oral (!) 123 18 98 % 1.829 m (6') 79.4 kg (175 lb)        LDAs:   Peripheral IV 06/20/24 Posterior;Right Forearm (Active)   Site Assessment Clean, dry & intact 06/20/24 1902   Line Status Infusing 06/20/24 1902   Phlebitis Assessment No symptoms 06/20/24 1754   Infiltration Assessment 0 06/20/24 1754   Dressing Status Clean, dry & intact 06/20/24 1634       Ambulatory Status:  No data recorded    Diagnosis:  DISPOSITION Admitted 06/20/2024 08:22:26 PM   Final diagnoses:   Cellulitis and abscess of leg        Consults:  None     Pain Score:  Pain Assessment  Pain Assessment: None - Denies Pain    C-SSRS:   Risk of Suicide: No Risk    Sepsis Screening:       [place] Fall Risk:       Swallow Screening        Preferred Language:   English      ALLERGIES     Coricidin hbp [dm-apap-cpm] and Sulfa antibiotics    SURGICAL HISTORY     History reviewed. No pertinent surgical history.    PAST MEDICAL HISTORY       Past Medical History:   Diagnosis Date    Hyperlipidemia     Hypertension            Electronically signed by Bita Badillo RN on 6/20/2024 at 8:35 PM

## 2024-06-21 NOTE — H&P
Value Ref Range    Prolactin 15.3 ng/ml   Magnesium   Result Value Ref Range    Magnesium 1.7 1.6 - 2.4 mg/dL   Anion Gap   Result Value Ref Range    Anion Gap 14.0 8.0 - 16.0 meq/L   Osmolality   Result Value Ref Range    Osmolality Calc 280.7 275.0 - 300.0 mOsmol/kg   Glomerular Filtration Rate, Estimated   Result Value Ref Range    Est, Glom Filt Rate 67 >60 ml/min/1.73m2   Lactate, Sepsis   Result Value Ref Range    Lactic Acid, Sepsis 1.3 0.5 - 1.9 mmol/L   Lactate, Sepsis   Result Value Ref Range    Lactic Acid, Sepsis 1.6 0.5 - 1.9 mmol/L   Digoxin Level   Result Value Ref Range    Digoxin Lvl < 0.3 (L) 0.5 - 2.0 ng/mL   Lactate, Sepsis   Result Value Ref Range    Lactic Acid, Sepsis 1.3 0.5 - 1.9 mmol/L   Iron   Result Value Ref Range    Iron 29 (L) 65 - 195 ug/dL   Iron binding capacity   Result Value Ref Range    TIBC 296 171 - 450 ug/dL   Ferritin   Result Value Ref Range    Ferritin 75 22 - 322 ng/mL   IRON SATURATION   Result Value Ref Range    Iron % Saturation 10 (L) 20 - 50 %   Brain Natriuretic Peptide   Result Value Ref Range    Pro-BNP 7035.0 (H) 0.0 - 449.0 pg/mL   EKG 12 Lead   Result Value Ref Range    Ventricular Rate 119 BPM    QRS Duration 100 ms    Q-T Interval 324 ms    QTc Calculation (Bazett) 455 ms    R Axis -56 degrees    T Axis 35 degrees       Diagnostics:  Vascular duplex lower extremity venous bilateral    Result Date: 6/20/2024  Venous duplex ultrasound bilateral lower extremity Comparison: None Findings: The visualized deep veins are fully compressible with normal Doppler color flow and spectral tracings. No popliteal cyst.     Negative for bilateral lower extremity deep vein thrombosis. This document has been electronically signed by: Juan Francisco Bowen MD on 06/20/2024 06:19 PM Technique Used: Duplex examination performed utilizing grayscale, color and spectral analysis.      EKG:   As above    Micro:  No active infections    Signed:  Dr. Linda Amaro DO  IM Resident,

## 2024-06-21 NOTE — CARE COORDINATION
Case Management Assessment Initial Evaluation    Date/Time of Evaluation: 6/21/2024 7:39 AM  Assessment Completed by: Sari Fu RN    If patient is discharged prior to next notation, then this note serves as note for discharge by case management.    Patient Name: Tato Cannon                   YOB: 1940  Diagnosis: Cellulitis and abscess of leg [L03.119, L02.419]  Cellulitis of lower extremity, unspecified laterality [L03.119]                   Date / Time: 6/20/2024  4:26 PM  Location: [facility]     Patient Admission Status: Inpatient   Readmission Risk Low 0-14, Mod 15-19), High > 20: Readmission Risk Score: 15.9    Current PCP: Mao Spencer MD    Additional Case Management Notes: to ED private vehicle for evaluation of bilat leg swelling and seeping.   PM Hx of CHF, A-fib on Eliquis, hypothyroidism.   Lactic 2.2, IVF, Bumex IV, Zosyn IV q 8 hrs. Vancomycin IV.    EKG on 6/20 confirmed Atrial Fib rate 119 bpm. Cardiology consulted.  Pt developed RVR rate 130- 148 bpm. Dr Everett here and plans tx to higher level of care with Amiodarone gtt ordered.    Procedures: US liver pending     Imaging: Recent venous Doppler : negative for DVT    Patient Goals/Plan/Treatment Preferences: Met with Tato; he lives at home with his son Silas. He was independent pta, currently drives, uses no DME, and requested RW order placed. He currently follows with Dr العراقي in CHF clinic; SW consulted for HH or possible SNF.           06/21/24 1227   Service Assessment   Patient Orientation Alert and Oriented   Cognition Short Term Memory Deficit   History Provided By Patient   Primary Caregiver Self   Accompanied By/Relationship unaccomp.   Support Systems Children  (lives with son Silas)   Patient's Healthcare Decision Maker is: Legal Next of Kin   PCP Verified by CM Yes   Last Visit to PCP Within last 3 months   Prior Functional Level Assistance with the following:;Cooking;Housework;Shopping;Mobility

## 2024-06-21 NOTE — CONSULTS
[facility]'s  Consult    Patient's Name/Date of Birth: Tato Cannon / 1940 (84 y.o.)    Date: June 21, 2024     Referring Provider: Pete Everett MD    CHIEF COMPLAINT: Leg swelling    Reason for consult: permanent afib, HFrEF    HPI: Tato Cannon is a 84 y.o. male with a PMH of permanent Afib, HTN, HLD, HFrEF(40 - 45% on 2/3/24) who was admitted 6/20 for bilateral lower extremity cellulitis.  At the bedside, patient is denying any symptoms of dizziness, lightheadedness, chest pain, shortness of breath.  Admits to memory problems.  Does have significant LE edema bilaterally.    Labs:  Digoxin level: <0.3.  Creatinine:1.3  K: 4.1  Magnesium: 1.7  Troponin trend: None  BNP: 7035  HGB: 10.4    EKGs:  6/20/21 @ 1636: Atrial fibrillation with rapid ventricular response with premature ventricular or aberrantly conducted complexes Left axis deviation Incomplete right bundle branch block Inferior infarct (cited on or before 18-MAY-2023) Cannot rule out Anterior infarct (cited on or before 18-MAY-2023) Abnormal ECG Confirmed by JENNIFER ARENAS ([id number]) on 6/21/2024 8:34:37 AM    Transthoracic Echo:  2/3/24 - Moderately reduced left ventricular systolic function with a visually estimated EF of 40 - 45%. Left ventricle size is normal. Normal wall thickness. Moderate global hypokinesis present. Severe hypokinesis of the following segments: apical anterior. Normal diastolic function.     Prior Left Heart Cath:  5/19/23 - IMPRESSION: No significant coronary artery disease.       All labs, EKG's, diagnostic testing and images as well as cardiac cath, stress testing were reviewed during this encounter    Past Medical History:   Diagnosis Date    Hyperlipidemia     Hypertension      History reviewed. No pertinent surgical history.  Current Facility-Administered Medications   Medication Dose Route Frequency Provider Last Rate Last Admin    [Held by provider] bumetanide (BUMEX) tablet 0.5 mg

## 2024-06-22 ENCOUNTER — APPOINTMENT (OUTPATIENT)
Dept: ULTRASOUND IMAGING | Age: 84
DRG: 602 | End: 2024-06-22
Payer: MEDICARE

## 2024-06-22 PROBLEM — E88.09 HYPOALBUMINEMIA: Status: ACTIVE | Noted: 2024-06-22

## 2024-06-22 PROBLEM — R74.8 ELEVATED ALKALINE PHOSPHATASE LEVEL: Status: ACTIVE | Noted: 2024-06-22

## 2024-06-22 PROBLEM — R93.2 ABNORMAL LIVER ULTRASOUND: Status: ACTIVE | Noted: 2024-06-22

## 2024-06-22 PROBLEM — L02.419 CELLULITIS AND ABSCESS OF LEG: Status: ACTIVE | Noted: 2024-06-20

## 2024-06-22 LAB
ALBUMIN SERPL BCG-MCNC: 2.6 G/DL (ref 3.5–5.1)
ALP SERPL-CCNC: 175 U/L (ref 38–126)
ALT SERPL W/O P-5'-P-CCNC: 18 U/L (ref 11–66)
ANION GAP SERPL CALC-SCNC: 11 MEQ/L (ref 8–16)
AST SERPL-CCNC: 19 U/L (ref 5–40)
BILIRUB SERPL-MCNC: 1 MG/DL (ref 0.3–1.2)
BUN SERPL-MCNC: 29 MG/DL (ref 7–22)
CALCIUM SERPL-MCNC: 8.3 MG/DL (ref 8.5–10.5)
CHLORIDE SERPL-SCNC: 101 MEQ/L (ref 98–111)
CO2 SERPL-SCNC: 22 MEQ/L (ref 23–33)
CREAT SERPL-MCNC: 1.4 MG/DL (ref 0.4–1.2)
DEPRECATED RDW RBC AUTO: 53.5 FL (ref 35–45)
ERYTHROCYTE [DISTWIDTH] IN BLOOD BY AUTOMATED COUNT: 15.6 % (ref 11.5–14.5)
GFR SERPL CREATININE-BSD FRML MDRD: 50 ML/MIN/1.73M2
GLUCOSE SERPL-MCNC: 106 MG/DL (ref 70–108)
HAV IGM SER QL: NEGATIVE
HBV CORE IGM SERPL QL IA: NEGATIVE
HBV SURFACE AG SERPL QL IA: NEGATIVE
HCT VFR BLD AUTO: 34.2 % (ref 42–52)
HCV IGG SERPL QL IA: NEGATIVE
HGB BLD-MCNC: 10.6 GM/DL (ref 14–18)
MCH RBC QN AUTO: 29.5 PG (ref 26–33)
MCHC RBC AUTO-ENTMCNC: 31 GM/DL (ref 32.2–35.5)
MCV RBC AUTO: 95.3 FL (ref 80–94)
PLATELET # BLD AUTO: 164 THOU/MM3 (ref 130–400)
PMV BLD AUTO: 10.6 FL (ref 9.4–12.4)
POTASSIUM SERPL-SCNC: 4 MEQ/L (ref 3.5–5.2)
PROT SERPL-MCNC: 5.9 G/DL (ref 6.1–8)
RBC # BLD AUTO: 3.59 MILL/MM3 (ref 4.7–6.1)
SODIUM SERPL-SCNC: 134 MEQ/L (ref 135–145)
VANCOMYCIN SERPL-MCNC: 17 UG/ML (ref 0.1–39.9)
WBC # BLD AUTO: 8.8 THOU/MM3 (ref 4.8–10.8)

## 2024-06-22 PROCEDURE — P9047 ALBUMIN (HUMAN), 25%, 50ML: HCPCS | Performed by: STUDENT IN AN ORGANIZED HEALTH CARE EDUCATION/TRAINING PROGRAM

## 2024-06-22 PROCEDURE — 2580000003 HC RX 258: Performed by: STUDENT IN AN ORGANIZED HEALTH CARE EDUCATION/TRAINING PROGRAM

## 2024-06-22 PROCEDURE — 80053 COMPREHEN METABOLIC PANEL: CPT

## 2024-06-22 PROCEDURE — 6370000000 HC RX 637 (ALT 250 FOR IP)

## 2024-06-22 PROCEDURE — 85027 COMPLETE CBC AUTOMATED: CPT

## 2024-06-22 PROCEDURE — 2060000000 HC ICU INTERMEDIATE R&B

## 2024-06-22 PROCEDURE — 6370000000 HC RX 637 (ALT 250 FOR IP): Performed by: STUDENT IN AN ORGANIZED HEALTH CARE EDUCATION/TRAINING PROGRAM

## 2024-06-22 PROCEDURE — 80202 ASSAY OF VANCOMYCIN: CPT

## 2024-06-22 PROCEDURE — 99233 SBSQ HOSP IP/OBS HIGH 50: CPT | Performed by: STUDENT IN AN ORGANIZED HEALTH CARE EDUCATION/TRAINING PROGRAM

## 2024-06-22 PROCEDURE — 99232 SBSQ HOSP IP/OBS MODERATE 35: CPT | Performed by: PHYSICIAN ASSISTANT

## 2024-06-22 PROCEDURE — 6360000002 HC RX W HCPCS: Performed by: STUDENT IN AN ORGANIZED HEALTH CARE EDUCATION/TRAINING PROGRAM

## 2024-06-22 PROCEDURE — 76705 ECHO EXAM OF ABDOMEN: CPT

## 2024-06-22 PROCEDURE — 36415 COLL VENOUS BLD VENIPUNCTURE: CPT

## 2024-06-22 PROCEDURE — 2500000003 HC RX 250 WO HCPCS: Performed by: STUDENT IN AN ORGANIZED HEALTH CARE EDUCATION/TRAINING PROGRAM

## 2024-06-22 PROCEDURE — 80074 ACUTE HEPATITIS PANEL: CPT

## 2024-06-22 RX ORDER — ALBUMIN (HUMAN) 12.5 G/50ML
25 SOLUTION INTRAVENOUS ONCE
Status: COMPLETED | OUTPATIENT
Start: 2024-06-22 | End: 2024-06-22

## 2024-06-22 RX ORDER — BUMETANIDE 0.25 MG/ML
2 INJECTION INTRAMUSCULAR; INTRAVENOUS 2 TIMES DAILY
Status: DISCONTINUED | OUTPATIENT
Start: 2024-06-22 | End: 2024-06-24

## 2024-06-22 RX ADMIN — AMIODARONE HYDROCHLORIDE 0.5 MG/MIN: 1.8 INJECTION, SOLUTION INTRAVENOUS at 04:40

## 2024-06-22 RX ADMIN — PIPERACILLIN AND TAZOBACTAM 3375 MG: 3; .375 INJECTION, POWDER, FOR SOLUTION INTRAVENOUS at 00:25

## 2024-06-22 RX ADMIN — TAMSULOSIN HYDROCHLORIDE 0.4 MG: 0.4 CAPSULE ORAL at 08:39

## 2024-06-22 RX ADMIN — APIXABAN 5 MG: 5 TABLET, FILM COATED ORAL at 08:39

## 2024-06-22 RX ADMIN — BUMETANIDE 1 MG: 0.25 INJECTION INTRAMUSCULAR; INTRAVENOUS at 08:37

## 2024-06-22 RX ADMIN — ATORVASTATIN CALCIUM 40 MG: 40 TABLET, FILM COATED ORAL at 08:39

## 2024-06-22 RX ADMIN — Medication 1250 MG: at 20:31

## 2024-06-22 RX ADMIN — SODIUM CHLORIDE, PRESERVATIVE FREE 10 ML: 5 INJECTION INTRAVENOUS at 08:40

## 2024-06-22 RX ADMIN — PIPERACILLIN AND TAZOBACTAM 3375 MG: 3; .375 INJECTION, POWDER, FOR SOLUTION INTRAVENOUS at 15:40

## 2024-06-22 RX ADMIN — DIGOXIN 125 MCG: 0.12 TABLET ORAL at 08:39

## 2024-06-22 RX ADMIN — Medication 100 MG: at 08:39

## 2024-06-22 RX ADMIN — ACETAMINOPHEN 650 MG: 325 TABLET ORAL at 20:27

## 2024-06-22 RX ADMIN — METOPROLOL SUCCINATE 100 MG: 100 TABLET, EXTENDED RELEASE ORAL at 08:39

## 2024-06-22 RX ADMIN — ALBUMIN (HUMAN) 25 G: 0.25 INJECTION, SOLUTION INTRAVENOUS at 22:40

## 2024-06-22 RX ADMIN — PIPERACILLIN AND TAZOBACTAM 3375 MG: 3; .375 INJECTION, POWDER, FOR SOLUTION INTRAVENOUS at 08:42

## 2024-06-22 RX ADMIN — AMIODARONE HYDROCHLORIDE 0.5 MG/MIN: 1.8 INJECTION, SOLUTION INTRAVENOUS at 16:07

## 2024-06-22 RX ADMIN — BUMETANIDE 2 MG: 0.25 INJECTION INTRAMUSCULAR; INTRAVENOUS at 18:34

## 2024-06-22 RX ADMIN — SODIUM CHLORIDE, PRESERVATIVE FREE 10 ML: 5 INJECTION INTRAVENOUS at 20:28

## 2024-06-22 RX ADMIN — APIXABAN 5 MG: 5 TABLET, FILM COATED ORAL at 20:27

## 2024-06-22 ASSESSMENT — PAIN SCALES - GENERAL
PAINLEVEL_OUTOF10: 3
PAINLEVEL_OUTOF10: 0

## 2024-06-22 ASSESSMENT — PAIN DESCRIPTION - ONSET: ONSET: ON-GOING

## 2024-06-22 ASSESSMENT — PAIN DESCRIPTION - DESCRIPTORS: DESCRIPTORS: DISCOMFORT

## 2024-06-22 ASSESSMENT — PAIN DESCRIPTION - FREQUENCY: FREQUENCY: INTERMITTENT

## 2024-06-22 ASSESSMENT — PAIN DESCRIPTION - ORIENTATION: ORIENTATION: RIGHT;LEFT

## 2024-06-22 ASSESSMENT — PAIN DESCRIPTION - LOCATION: LOCATION: KNEE

## 2024-06-22 ASSESSMENT — PAIN - FUNCTIONAL ASSESSMENT: PAIN_FUNCTIONAL_ASSESSMENT: ACTIVITIES ARE NOT PREVENTED

## 2024-06-22 ASSESSMENT — PAIN DESCRIPTION - PAIN TYPE: TYPE: CHRONIC PAIN

## 2024-06-23 ENCOUNTER — APPOINTMENT (OUTPATIENT)
Dept: GENERAL RADIOLOGY | Age: 84
DRG: 602 | End: 2024-06-23
Payer: MEDICARE

## 2024-06-23 LAB
ALBUMIN SERPL BCG-MCNC: 2.8 G/DL (ref 3.5–5.1)
ALP SERPL-CCNC: 207 U/L (ref 38–126)
ALT SERPL W/O P-5'-P-CCNC: 27 U/L (ref 11–66)
ANION GAP SERPL CALC-SCNC: 13 MEQ/L (ref 8–16)
AST SERPL-CCNC: 37 U/L (ref 5–40)
BILIRUB SERPL-MCNC: 1 MG/DL (ref 0.3–1.2)
BUN SERPL-MCNC: 30 MG/DL (ref 7–22)
CALCIUM SERPL-MCNC: 8.3 MG/DL (ref 8.5–10.5)
CHLORIDE SERPL-SCNC: 98 MEQ/L (ref 98–111)
CO2 SERPL-SCNC: 23 MEQ/L (ref 23–33)
CREAT SERPL-MCNC: 1.2 MG/DL (ref 0.4–1.2)
DEPRECATED RDW RBC AUTO: 54.1 FL (ref 35–45)
ERYTHROCYTE [DISTWIDTH] IN BLOOD BY AUTOMATED COUNT: 15.7 % (ref 11.5–14.5)
GFR SERPL CREATININE-BSD FRML MDRD: 60 ML/MIN/1.73M2
GLUCOSE SERPL-MCNC: 107 MG/DL (ref 70–108)
HCT VFR BLD AUTO: 33 % (ref 42–52)
HGB BLD-MCNC: 10.2 GM/DL (ref 14–18)
MCH RBC QN AUTO: 29.7 PG (ref 26–33)
MCHC RBC AUTO-ENTMCNC: 30.9 GM/DL (ref 32.2–35.5)
MCV RBC AUTO: 95.9 FL (ref 80–94)
PLATELET # BLD AUTO: 191 THOU/MM3 (ref 130–400)
PMV BLD AUTO: 11 FL (ref 9.4–12.4)
POTASSIUM SERPL-SCNC: 3.8 MEQ/L (ref 3.5–5.2)
PROT SERPL-MCNC: 5.9 G/DL (ref 6.1–8)
RBC # BLD AUTO: 3.44 MILL/MM3 (ref 4.7–6.1)
SODIUM SERPL-SCNC: 134 MEQ/L (ref 135–145)
WBC # BLD AUTO: 6.8 THOU/MM3 (ref 4.8–10.8)

## 2024-06-23 PROCEDURE — 6370000000 HC RX 637 (ALT 250 FOR IP): Performed by: STUDENT IN AN ORGANIZED HEALTH CARE EDUCATION/TRAINING PROGRAM

## 2024-06-23 PROCEDURE — 73552 X-RAY EXAM OF FEMUR 2/>: CPT

## 2024-06-23 PROCEDURE — 85027 COMPLETE CBC AUTOMATED: CPT

## 2024-06-23 PROCEDURE — 99233 SBSQ HOSP IP/OBS HIGH 50: CPT | Performed by: STUDENT IN AN ORGANIZED HEALTH CARE EDUCATION/TRAINING PROGRAM

## 2024-06-23 PROCEDURE — 99222 1ST HOSP IP/OBS MODERATE 55: CPT | Performed by: PHYSICIAN ASSISTANT

## 2024-06-23 PROCEDURE — 6370000000 HC RX 637 (ALT 250 FOR IP)

## 2024-06-23 PROCEDURE — P9047 ALBUMIN (HUMAN), 25%, 50ML: HCPCS | Performed by: STUDENT IN AN ORGANIZED HEALTH CARE EDUCATION/TRAINING PROGRAM

## 2024-06-23 PROCEDURE — 2500000003 HC RX 250 WO HCPCS

## 2024-06-23 PROCEDURE — 73620 X-RAY EXAM OF FOOT: CPT

## 2024-06-23 PROCEDURE — 36415 COLL VENOUS BLD VENIPUNCTURE: CPT

## 2024-06-23 PROCEDURE — 73590 X-RAY EXAM OF LOWER LEG: CPT

## 2024-06-23 PROCEDURE — 6360000002 HC RX W HCPCS: Performed by: STUDENT IN AN ORGANIZED HEALTH CARE EDUCATION/TRAINING PROGRAM

## 2024-06-23 PROCEDURE — 80053 COMPREHEN METABOLIC PANEL: CPT

## 2024-06-23 PROCEDURE — 2580000003 HC RX 258: Performed by: STUDENT IN AN ORGANIZED HEALTH CARE EDUCATION/TRAINING PROGRAM

## 2024-06-23 PROCEDURE — 2500000003 HC RX 250 WO HCPCS: Performed by: STUDENT IN AN ORGANIZED HEALTH CARE EDUCATION/TRAINING PROGRAM

## 2024-06-23 PROCEDURE — 2060000000 HC ICU INTERMEDIATE R&B

## 2024-06-23 RX ORDER — ALBUMIN (HUMAN) 12.5 G/50ML
25 SOLUTION INTRAVENOUS ONCE
Status: COMPLETED | OUTPATIENT
Start: 2024-06-23 | End: 2024-06-23

## 2024-06-23 RX ORDER — METOLAZONE 5 MG/1
5 TABLET ORAL ONCE
Status: COMPLETED | OUTPATIENT
Start: 2024-06-23 | End: 2024-06-23

## 2024-06-23 RX ADMIN — PIPERACILLIN AND TAZOBACTAM 3375 MG: 3; .375 INJECTION, POWDER, FOR SOLUTION INTRAVENOUS at 08:31

## 2024-06-23 RX ADMIN — PIPERACILLIN AND TAZOBACTAM 3375 MG: 3; .375 INJECTION, POWDER, FOR SOLUTION INTRAVENOUS at 16:29

## 2024-06-23 RX ADMIN — PIPERACILLIN AND TAZOBACTAM 3375 MG: 3; .375 INJECTION, POWDER, FOR SOLUTION INTRAVENOUS at 00:07

## 2024-06-23 RX ADMIN — ATORVASTATIN CALCIUM 40 MG: 40 TABLET, FILM COATED ORAL at 08:18

## 2024-06-23 RX ADMIN — FOLIC ACID 1 MG: 5 INJECTION, SOLUTION INTRAMUSCULAR; INTRAVENOUS; SUBCUTANEOUS at 08:26

## 2024-06-23 RX ADMIN — Medication 1250 MG: at 17:53

## 2024-06-23 RX ADMIN — Medication 100 MG: at 08:17

## 2024-06-23 RX ADMIN — BUMETANIDE 2 MG: 0.25 INJECTION INTRAMUSCULAR; INTRAVENOUS at 08:16

## 2024-06-23 RX ADMIN — SODIUM CHLORIDE, PRESERVATIVE FREE 10 ML: 5 INJECTION INTRAVENOUS at 08:18

## 2024-06-23 RX ADMIN — BUMETANIDE 2 MG: 0.25 INJECTION INTRAMUSCULAR; INTRAVENOUS at 17:47

## 2024-06-23 RX ADMIN — APIXABAN 5 MG: 5 TABLET, FILM COATED ORAL at 08:17

## 2024-06-23 RX ADMIN — TAMSULOSIN HYDROCHLORIDE 0.4 MG: 0.4 CAPSULE ORAL at 08:18

## 2024-06-23 RX ADMIN — ALBUMIN (HUMAN) 25 G: 0.25 INJECTION, SOLUTION INTRAVENOUS at 12:40

## 2024-06-23 RX ADMIN — AMIODARONE HYDROCHLORIDE 0.5 MG/MIN: 1.8 INJECTION, SOLUTION INTRAVENOUS at 02:35

## 2024-06-23 RX ADMIN — METOLAZONE 5 MG: 5 TABLET ORAL at 12:34

## 2024-06-23 RX ADMIN — APIXABAN 5 MG: 5 TABLET, FILM COATED ORAL at 19:53

## 2024-06-23 RX ADMIN — METOPROLOL SUCCINATE 100 MG: 100 TABLET, EXTENDED RELEASE ORAL at 08:17

## 2024-06-23 RX ADMIN — DIGOXIN 125 MCG: 0.12 TABLET ORAL at 08:17

## 2024-06-24 LAB
ANION GAP SERPL CALC-SCNC: 11 MEQ/L (ref 8–16)
ANION GAP SERPL CALC-SCNC: 12 MEQ/L (ref 8–16)
BUN SERPL-MCNC: 31 MG/DL (ref 7–22)
BUN SERPL-MCNC: 32 MG/DL (ref 7–22)
CALCIUM SERPL-MCNC: 9.2 MG/DL (ref 8.5–10.5)
CALCIUM SERPL-MCNC: 9.6 MG/DL (ref 8.5–10.5)
CHLORIDE SERPL-SCNC: 88 MEQ/L (ref 98–111)
CHLORIDE SERPL-SCNC: 92 MEQ/L (ref 98–111)
CO2 SERPL-SCNC: 31 MEQ/L (ref 23–33)
CO2 SERPL-SCNC: 33 MEQ/L (ref 23–33)
CREAT SERPL-MCNC: 1.5 MG/DL (ref 0.4–1.2)
CREAT SERPL-MCNC: 1.6 MG/DL (ref 0.4–1.2)
DEPRECATED RDW RBC AUTO: 51.2 FL (ref 35–45)
ERYTHROCYTE [DISTWIDTH] IN BLOOD BY AUTOMATED COUNT: 15.3 % (ref 11.5–14.5)
GFR SERPL CREATININE-BSD FRML MDRD: 42 ML/MIN/1.73M2
GFR SERPL CREATININE-BSD FRML MDRD: 46 ML/MIN/1.73M2
GLUCOSE SERPL-MCNC: 120 MG/DL (ref 70–108)
GLUCOSE SERPL-MCNC: 180 MG/DL (ref 70–108)
HCT VFR BLD AUTO: 36.2 % (ref 42–52)
HGB BLD-MCNC: 11.4 GM/DL (ref 14–18)
MAGNESIUM SERPL-MCNC: 1.6 MG/DL (ref 1.6–2.4)
MAGNESIUM SERPL-MCNC: 1.7 MG/DL (ref 1.6–2.4)
MCH RBC QN AUTO: 29.2 PG (ref 26–33)
MCHC RBC AUTO-ENTMCNC: 31.5 GM/DL (ref 32.2–35.5)
MCV RBC AUTO: 92.6 FL (ref 80–94)
PLATELET # BLD AUTO: 221 THOU/MM3 (ref 130–400)
PMV BLD AUTO: 10.1 FL (ref 9.4–12.4)
POTASSIUM SERPL-SCNC: 2.9 MEQ/L (ref 3.5–5.2)
POTASSIUM SERPL-SCNC: 3.7 MEQ/L (ref 3.5–5.2)
RBC # BLD AUTO: 3.91 MILL/MM3 (ref 4.7–6.1)
SODIUM SERPL-SCNC: 132 MEQ/L (ref 135–145)
SODIUM SERPL-SCNC: 135 MEQ/L (ref 135–145)
WBC # BLD AUTO: 6.8 THOU/MM3 (ref 4.8–10.8)

## 2024-06-24 PROCEDURE — 99232 SBSQ HOSP IP/OBS MODERATE 35: CPT | Performed by: PHYSICIAN ASSISTANT

## 2024-06-24 PROCEDURE — 83735 ASSAY OF MAGNESIUM: CPT

## 2024-06-24 PROCEDURE — P9047 ALBUMIN (HUMAN), 25%, 50ML: HCPCS

## 2024-06-24 PROCEDURE — 6370000000 HC RX 637 (ALT 250 FOR IP)

## 2024-06-24 PROCEDURE — 6360000002 HC RX W HCPCS

## 2024-06-24 PROCEDURE — 6370000000 HC RX 637 (ALT 250 FOR IP): Performed by: STUDENT IN AN ORGANIZED HEALTH CARE EDUCATION/TRAINING PROGRAM

## 2024-06-24 PROCEDURE — 80048 BASIC METABOLIC PNL TOTAL CA: CPT

## 2024-06-24 PROCEDURE — 2500000003 HC RX 250 WO HCPCS

## 2024-06-24 PROCEDURE — 2580000003 HC RX 258

## 2024-06-24 PROCEDURE — 36415 COLL VENOUS BLD VENIPUNCTURE: CPT

## 2024-06-24 PROCEDURE — 85027 COMPLETE CBC AUTOMATED: CPT

## 2024-06-24 PROCEDURE — 2580000003 HC RX 258: Performed by: STUDENT IN AN ORGANIZED HEALTH CARE EDUCATION/TRAINING PROGRAM

## 2024-06-24 PROCEDURE — 6360000002 HC RX W HCPCS: Performed by: STUDENT IN AN ORGANIZED HEALTH CARE EDUCATION/TRAINING PROGRAM

## 2024-06-24 PROCEDURE — 99233 SBSQ HOSP IP/OBS HIGH 50: CPT | Performed by: STUDENT IN AN ORGANIZED HEALTH CARE EDUCATION/TRAINING PROGRAM

## 2024-06-24 PROCEDURE — 2060000000 HC ICU INTERMEDIATE R&B

## 2024-06-24 RX ORDER — ALBUMIN (HUMAN) 12.5 G/50ML
25 SOLUTION INTRAVENOUS ONCE
Status: COMPLETED | OUTPATIENT
Start: 2024-06-24 | End: 2024-06-24

## 2024-06-24 RX ORDER — ALPRAZOLAM 0.5 MG/1
0.25 TABLET ORAL 2 TIMES DAILY PRN
Status: DISCONTINUED | OUTPATIENT
Start: 2024-06-24 | End: 2024-06-26 | Stop reason: HOSPADM

## 2024-06-24 RX ORDER — BUMETANIDE 0.25 MG/ML
2 INJECTION INTRAMUSCULAR; INTRAVENOUS 2 TIMES DAILY
Status: DISCONTINUED | OUTPATIENT
Start: 2024-06-24 | End: 2024-06-24

## 2024-06-24 RX ORDER — POTASSIUM CHLORIDE 20 MEQ/1
40 TABLET, EXTENDED RELEASE ORAL
Status: DISCONTINUED | OUTPATIENT
Start: 2024-06-24 | End: 2024-06-24

## 2024-06-24 RX ORDER — BUMETANIDE 1 MG/1
1 TABLET ORAL 2 TIMES DAILY
Status: DISCONTINUED | OUTPATIENT
Start: 2024-06-24 | End: 2024-06-24

## 2024-06-24 RX ORDER — POTASSIUM CHLORIDE 20 MEQ/1
80 TABLET, EXTENDED RELEASE ORAL ONCE
Status: DISCONTINUED | OUTPATIENT
Start: 2024-06-24 | End: 2024-06-24

## 2024-06-24 RX ORDER — POTASSIUM CHLORIDE 20 MEQ/1
40 TABLET, EXTENDED RELEASE ORAL EVERY 4 HOURS
Status: DISCONTINUED | OUTPATIENT
Start: 2024-06-24 | End: 2024-06-24

## 2024-06-24 RX ORDER — MAGNESIUM SULFATE IN WATER 40 MG/ML
2000 INJECTION, SOLUTION INTRAVENOUS ONCE
Status: COMPLETED | OUTPATIENT
Start: 2024-06-24 | End: 2024-06-24

## 2024-06-24 RX ORDER — LANOLIN ALCOHOL/MO/W.PET/CERES
3 CREAM (GRAM) TOPICAL NIGHTLY PRN
Status: DISCONTINUED | OUTPATIENT
Start: 2024-06-24 | End: 2024-06-26 | Stop reason: HOSPADM

## 2024-06-24 RX ORDER — POTASSIUM CHLORIDE 7.45 MG/ML
10 INJECTION INTRAVENOUS
Status: COMPLETED | OUTPATIENT
Start: 2024-06-24 | End: 2024-06-24

## 2024-06-24 RX ORDER — BUMETANIDE 1 MG/1
1 TABLET ORAL 2 TIMES DAILY
Status: DISCONTINUED | OUTPATIENT
Start: 2024-06-24 | End: 2024-06-26 | Stop reason: HOSPADM

## 2024-06-24 RX ADMIN — Medication 1250 MG: at 18:10

## 2024-06-24 RX ADMIN — PIPERACILLIN AND TAZOBACTAM 3375 MG: 3; .375 INJECTION, POWDER, FOR SOLUTION INTRAVENOUS at 00:07

## 2024-06-24 RX ADMIN — POTASSIUM CHLORIDE 10 MEQ: 7.46 INJECTION, SOLUTION INTRAVENOUS at 11:57

## 2024-06-24 RX ADMIN — POTASSIUM CHLORIDE 10 MEQ: 7.46 INJECTION, SOLUTION INTRAVENOUS at 12:45

## 2024-06-24 RX ADMIN — TAMSULOSIN HYDROCHLORIDE 0.4 MG: 0.4 CAPSULE ORAL at 07:51

## 2024-06-24 RX ADMIN — POTASSIUM CHLORIDE 10 MEQ: 7.46 INJECTION, SOLUTION INTRAVENOUS at 09:06

## 2024-06-24 RX ADMIN — DIGOXIN 125 MCG: 0.12 TABLET ORAL at 07:51

## 2024-06-24 RX ADMIN — BUMETANIDE 1 MG: 1 TABLET ORAL at 18:56

## 2024-06-24 RX ADMIN — BUMETANIDE 2 MG: 0.25 INJECTION INTRAMUSCULAR; INTRAVENOUS at 07:59

## 2024-06-24 RX ADMIN — METOPROLOL SUCCINATE 100 MG: 100 TABLET, EXTENDED RELEASE ORAL at 07:50

## 2024-06-24 RX ADMIN — APIXABAN 5 MG: 5 TABLET, FILM COATED ORAL at 07:51

## 2024-06-24 RX ADMIN — POTASSIUM CHLORIDE 10 MEQ: 7.46 INJECTION, SOLUTION INTRAVENOUS at 06:38

## 2024-06-24 RX ADMIN — POTASSIUM CHLORIDE 40 MEQ: 1500 TABLET, EXTENDED RELEASE ORAL at 11:03

## 2024-06-24 RX ADMIN — PIPERACILLIN AND TAZOBACTAM 3375 MG: 3; .375 INJECTION, POWDER, FOR SOLUTION INTRAVENOUS at 17:27

## 2024-06-24 RX ADMIN — POTASSIUM CHLORIDE 10 MEQ: 7.46 INJECTION, SOLUTION INTRAVENOUS at 10:50

## 2024-06-24 RX ADMIN — SODIUM CHLORIDE, PRESERVATIVE FREE 10 ML: 5 INJECTION INTRAVENOUS at 07:59

## 2024-06-24 RX ADMIN — ALPRAZOLAM 0.25 MG: 0.5 TABLET ORAL at 18:56

## 2024-06-24 RX ADMIN — MAGNESIUM SULFATE HEPTAHYDRATE 2000 MG: 40 INJECTION, SOLUTION INTRAVENOUS at 11:12

## 2024-06-24 RX ADMIN — ATORVASTATIN CALCIUM 40 MG: 40 TABLET, FILM COATED ORAL at 07:51

## 2024-06-24 RX ADMIN — Medication 100 MG: at 07:51

## 2024-06-24 RX ADMIN — FOLIC ACID 1 MG: 5 INJECTION, SOLUTION INTRAMUSCULAR; INTRAVENOUS; SUBCUTANEOUS at 08:09

## 2024-06-24 RX ADMIN — ALBUMIN (HUMAN) 25 G: 0.25 INJECTION, SOLUTION INTRAVENOUS at 10:44

## 2024-06-24 RX ADMIN — POTASSIUM CHLORIDE 10 MEQ: 7.46 INJECTION, SOLUTION INTRAVENOUS at 07:43

## 2024-06-24 RX ADMIN — APIXABAN 5 MG: 5 TABLET, FILM COATED ORAL at 20:58

## 2024-06-24 RX ADMIN — PIPERACILLIN AND TAZOBACTAM 3375 MG: 3; .375 INJECTION, POWDER, FOR SOLUTION INTRAVENOUS at 08:06

## 2024-06-24 RX ADMIN — SODIUM CHLORIDE, PRESERVATIVE FREE 10 ML: 5 INJECTION INTRAVENOUS at 20:58

## 2024-06-24 RX ADMIN — CEFTRIAXONE SODIUM 1000 MG: 1 INJECTION, POWDER, FOR SOLUTION INTRAMUSCULAR; INTRAVENOUS at 23:23

## 2024-06-24 NOTE — CARE COORDINATION
6/24/24, 3:15 PM EDT    DISCHARGE ON GOING EVALUATION    [facility] day: 4  Location: -08/008-A Reason for admit: Cellulitis and abscess of leg [L03.119, L02.419]  Cellulitis of lower extremity, unspecified laterality [L03.119]     Procedures: none    Imaging since last note:   6/21 US liver:   1. Trace ascites.  2. Subtle nodular contour to the liver. No liver masses.  6/23 XR right foot:    1. Degenerative changes.  2. No evidence of osteomyelitis..  6/23 XR right femur:   1. No evidence of osteomyelitis or fracture.  2. Degenerative changes..  6/23 XR right tibia.   1. No fracture or evidence of osteomyelitis.  2. Degenerative changes. Chondrocalcinosis in the medial and lateral joint compartments of the knee joint.  3. Vascular calcification..  Barriers to Discharge: Internal med and cardio following. Creatinine elevated. K+ 2.9 with replacement ordered. IV vanc. IV zosyn. IV bumex.     PCP: Mao Spencer MD  Readmission Risk Score: 14.8    Patient Goals/Plan/Treatment Preferences: From home with son. Did not use dme,drove self. Order for walker placed. Current with CHF clinic. New orders for PT/OT, await recommendations.

## 2024-06-24 NOTE — PLAN OF CARE
Problem: Discharge Planning  Goal: Discharge to home or other facility with appropriate resources  6/24/2024 0234 by Jer Quan RN  Flowsheets (Taken 6/24/2024 0234)  Discharge to home or other facility with appropriate resources:   Identify barriers to discharge with patient and caregiver   Identify discharge learning needs (meds, wound care, etc)     Problem: Safety - Adult  Goal: Free from fall injury  6/24/2024 0234 by Jer Quan RN  Flowsheets (Taken 6/24/2024 0234)  Free From Fall Injury: Instruct family/caregiver on patient safety     Problem: Pain  Goal: Verbalizes/displays adequate comfort level or baseline comfort level  6/24/2024 0234 by Jer Quan RN  Flowsheets (Taken 6/24/2024 0234)  Verbalizes/displays adequate comfort level or baseline comfort level:   Encourage patient to monitor pain and request assistance   Administer analgesics based on type and severity of pain and evaluate response   Consider cultural and social influences on pain and pain management   Assess pain using appropriate pain scale   Implement non-pharmacological measures as appropriate and evaluate response     Problem: Cardiovascular - Adult  Goal: Maintains optimal cardiac output and hemodynamic stability  6/24/2024 0234 by Jer Quan RN  Flowsheets (Taken 6/24/2024 0234)  Maintains optimal cardiac output and hemodynamic stability:   Monitor blood pressure and heart rate   Monitor urine output and notify Licensed Independent Practitioner for values outside of normal range   Assess for signs of decreased cardiac output     Problem: Cardiovascular - Adult  Goal: Absence of cardiac dysrhythmias or at baseline  6/24/2024 0234 by Jer Quan RN  Flowsheets (Taken 6/24/2024 0234)  Absence of cardiac dysrhythmias or at baseline:   Monitor cardiac rate and rhythm   Assess for signs of decreased cardiac output   Administer antiarrhythmia medication and electrolyte replacement as ordered     Problem: 
  Problem: Discharge Planning  Goal: Discharge to home or other facility with appropriate resources  Outcome: Not Progressing     Problem: Safety - Adult  Goal: Free from fall injury  Outcome: Progressing     Problem: Chronic Conditions and Co-morbidities  Goal: Patient's chronic conditions and co-morbidity symptoms are monitored and maintained or improved  Outcome: Progressing     Problem: Pain  Goal: Verbalizes/displays adequate comfort level or baseline comfort level  Outcome: Progressing     Problem: Cardiovascular - Adult  Goal: Maintains optimal cardiac output and hemodynamic stability  Outcome: Progressing  Goal: Absence of cardiac dysrhythmias or at baseline  Outcome: Progressing     Problem: Skin/Tissue Integrity - Adult  Goal: Skin integrity remains intact  Outcome: Progressing  Goal: Incisions, wounds, or drain sites healing without S/S of infection  Outcome: Progressing     Problem: Musculoskeletal - Adult  Goal: Return mobility to safest level of function  Outcome: Progressing     Problem: Infection - Adult  Goal: Absence of infection at discharge  Outcome: Progressing  Goal: Absence of infection during hospitalization  Outcome: Progressing     Problem: Skin/Tissue Integrity  Goal: Absence of new skin breakdown  Description: 1.  Monitor for areas of redness and/or skin breakdown  2.  Assess vascular access sites hourly  3.  Every 4-6 hours minimum:  Change oxygen saturation probe site  4.  Every 4-6 hours:  If on nasal continuous positive airway pressure, respiratory therapy assess nares and determine need for appliance change or resting period.  Outcome: Progressing     Problem: Discharge Planning  Goal: Discharge to home or other facility with appropriate resources  Outcome: Not Progressing     
  Problem: Discharge Planning  Goal: Discharge to home or other facility with appropriate resources  Outcome: Progressing     Problem: Safety - Adult  Goal: Free from fall injury  Outcome: Progressing     
  Problem: Safety - Adult  Goal: Free from fall injury  Outcome: Progressing     Problem: Chronic Conditions and Co-morbidities  Goal: Patient's chronic conditions and co-morbidity symptoms are monitored and maintained or improved  Outcome: Progressing     Problem: Pain  Goal: Verbalizes/displays adequate comfort level or baseline comfort level  Outcome: Progressing     Problem: Cardiovascular - Adult  Goal: Maintains optimal cardiac output and hemodynamic stability  Outcome: Progressing  Goal: Absence of cardiac dysrhythmias or at baseline  Outcome: Progressing     Problem: Skin/Tissue Integrity - Adult  Goal: Skin integrity remains intact  Outcome: Progressing  Goal: Incisions, wounds, or drain sites healing without S/S of infection  Outcome: Progressing     Problem: Musculoskeletal - Adult  Goal: Return mobility to safest level of function  Outcome: Progressing     Problem: Infection - Adult  Goal: Absence of infection at discharge  Outcome: Progressing  Goal: Absence of infection during hospitalization  Outcome: Progressing     Problem: Skin/Tissue Integrity  Goal: Absence of new skin breakdown  Description: 1.  Monitor for areas of redness and/or skin breakdown  2.  Assess vascular access sites hourly  3.  Every 4-6 hours minimum:  Change oxygen saturation probe site  4.  Every 4-6 hours:  If on nasal continuous positive airway pressure, respiratory therapy assess nares and determine need for appliance change or resting period.  Outcome: Progressing     Problem: Discharge Planning  Goal: Discharge to home or other facility with appropriate resources  Outcome: Not Progressing     
Care plan reviewed with patient and patient verbalize understanding of the plan of care and contribute to goal setting.     Problem: Discharge Planning  Goal: Discharge to home or other facility with appropriate resources  Outcome: Not Progressing     
Intact: Monitor for areas of redness and/or skin breakdown     Problem: Skin/Tissue Integrity - Adult  Goal: Incisions, wounds, or drain sites healing without S/S of infection  Flowsheets (Taken 6/22/2024 2216)  Incisions, Wounds, or Drain Sites Healing Without Sign and Symptoms of Infection:   TWICE DAILY: Assess and document dressing/incision, wound bed, drain sites and surrounding tissue   TWICE DAILY: Assess and document skin integrity     Problem: Musculoskeletal - Adult  Goal: Return mobility to safest level of function  Flowsheets (Taken 6/22/2024 2216)  Return Mobility to Safest Level of Function:   Assess patient stability and activity tolerance for standing, transferring and ambulating with or without assistive devices   Assist with transfers and ambulation using safe patient handling equipment as needed     Problem: Infection - Adult  Goal: Absence of infection at discharge  Flowsheets (Taken 6/22/2024 2216)  Absence of infection at discharge:   Assess and monitor for signs and symptoms of infection   Monitor all insertion sites i.e., indwelling lines, tubes and drains   Instruct and encourage patient and family to use good hand hygiene technique     Problem: Infection - Adult  Goal: Absence of infection during hospitalization  Flowsheets (Taken 6/22/2024 2216)  Absence of infection during hospitalization:   Monitor all insertion sites i.e., indwelling lines, tubes and drains   Assess and monitor for signs and symptoms of infection   Instruct and encourage patient and family to use good hand hygiene technique

## 2024-06-24 NOTE — DISCHARGE INSTRUCTIONS
Follow chf clinic 1 week  Follow up with dr gresham 2-4 weeks  BMP 1 week  Repeat echo in 3 months

## 2024-06-25 VITALS
DIASTOLIC BLOOD PRESSURE: 58 MMHG | SYSTOLIC BLOOD PRESSURE: 90 MMHG | WEIGHT: 147.27 LBS | BODY MASS INDEX: 19.95 KG/M2 | HEIGHT: 72 IN | TEMPERATURE: 96.8 F | HEART RATE: 89 BPM | RESPIRATION RATE: 20 BRPM | OXYGEN SATURATION: 96 %

## 2024-06-25 PROBLEM — E87.6 HYPOKALEMIA: Status: ACTIVE | Noted: 2024-06-25

## 2024-06-25 LAB
ANION GAP SERPL CALC-SCNC: 11 MEQ/L (ref 8–16)
ANION GAP SERPL CALC-SCNC: 12 MEQ/L (ref 8–16)
BACTERIA BLD AEROBE CULT: NORMAL
BACTERIA BLD AEROBE CULT: NORMAL
BUN SERPL-MCNC: 34 MG/DL (ref 7–22)
BUN SERPL-MCNC: 35 MG/DL (ref 7–22)
CALCIUM SERPL-MCNC: 9.4 MG/DL (ref 8.5–10.5)
CALCIUM SERPL-MCNC: 9.4 MG/DL (ref 8.5–10.5)
CHLORIDE SERPL-SCNC: 85 MEQ/L (ref 98–111)
CHLORIDE SERPL-SCNC: 88 MEQ/L (ref 98–111)
CO2 SERPL-SCNC: 32 MEQ/L (ref 23–33)
CO2 SERPL-SCNC: 36 MEQ/L (ref 23–33)
CREAT SERPL-MCNC: 1.9 MG/DL (ref 0.4–1.2)
CREAT SERPL-MCNC: 1.9 MG/DL (ref 0.4–1.2)
DEPRECATED RDW RBC AUTO: 49.5 FL (ref 35–45)
DIGOXIN SERPL-MCNC: 0.5 NG/ML (ref 0.5–2)
EKG Q-T INTERVAL: 364 MS
EKG QRS DURATION: 98 MS
EKG QTC CALCULATION (BAZETT): 447 MS
EKG R AXIS: -72 DEGREES
EKG T AXIS: 92 DEGREES
EKG VENTRICULAR RATE: 91 BPM
ERYTHROCYTE [DISTWIDTH] IN BLOOD BY AUTOMATED COUNT: 15 % (ref 11.5–14.5)
GFR SERPL CREATININE-BSD FRML MDRD: 35 ML/MIN/1.73M2
GFR SERPL CREATININE-BSD FRML MDRD: 35 ML/MIN/1.73M2
GLUCOSE SERPL-MCNC: 125 MG/DL (ref 70–108)
GLUCOSE SERPL-MCNC: 88 MG/DL (ref 70–108)
HCT VFR BLD AUTO: 36.9 % (ref 42–52)
HGB BLD-MCNC: 11.9 GM/DL (ref 14–18)
MCH RBC QN AUTO: 29.5 PG (ref 26–33)
MCHC RBC AUTO-ENTMCNC: 32.2 GM/DL (ref 32.2–35.5)
MCV RBC AUTO: 91.6 FL (ref 80–94)
PLATELET # BLD AUTO: 221 THOU/MM3 (ref 130–400)
PMV BLD AUTO: 10 FL (ref 9.4–12.4)
POTASSIUM SERPL-SCNC: 3.4 MEQ/L (ref 3.5–5.2)
POTASSIUM SERPL-SCNC: 3.5 MEQ/L (ref 3.5–5.2)
RBC # BLD AUTO: 4.03 MILL/MM3 (ref 4.7–6.1)
SODIUM SERPL-SCNC: 132 MEQ/L (ref 135–145)
SODIUM SERPL-SCNC: 132 MEQ/L (ref 135–145)
WBC # BLD AUTO: 6.4 THOU/MM3 (ref 4.8–10.8)

## 2024-06-25 PROCEDURE — 99239 HOSP IP/OBS DSCHRG MGMT >30: CPT | Performed by: STUDENT IN AN ORGANIZED HEALTH CARE EDUCATION/TRAINING PROGRAM

## 2024-06-25 PROCEDURE — 2500000003 HC RX 250 WO HCPCS

## 2024-06-25 PROCEDURE — 97535 SELF CARE MNGMENT TRAINING: CPT

## 2024-06-25 PROCEDURE — 80162 ASSAY OF DIGOXIN TOTAL: CPT

## 2024-06-25 PROCEDURE — 93010 ELECTROCARDIOGRAM REPORT: CPT | Performed by: NUCLEAR MEDICINE

## 2024-06-25 PROCEDURE — 85027 COMPLETE CBC AUTOMATED: CPT

## 2024-06-25 PROCEDURE — 93005 ELECTROCARDIOGRAM TRACING: CPT

## 2024-06-25 PROCEDURE — 2580000003 HC RX 258: Performed by: STUDENT IN AN ORGANIZED HEALTH CARE EDUCATION/TRAINING PROGRAM

## 2024-06-25 PROCEDURE — 99232 SBSQ HOSP IP/OBS MODERATE 35: CPT | Performed by: PHYSICIAN ASSISTANT

## 2024-06-25 PROCEDURE — 6370000000 HC RX 637 (ALT 250 FOR IP)

## 2024-06-25 PROCEDURE — 97530 THERAPEUTIC ACTIVITIES: CPT

## 2024-06-25 PROCEDURE — 36415 COLL VENOUS BLD VENIPUNCTURE: CPT

## 2024-06-25 PROCEDURE — 80048 BASIC METABOLIC PNL TOTAL CA: CPT

## 2024-06-25 PROCEDURE — 2060000000 HC ICU INTERMEDIATE R&B

## 2024-06-25 PROCEDURE — 6370000000 HC RX 637 (ALT 250 FOR IP): Performed by: STUDENT IN AN ORGANIZED HEALTH CARE EDUCATION/TRAINING PROGRAM

## 2024-06-25 PROCEDURE — 97166 OT EVAL MOD COMPLEX 45 MIN: CPT

## 2024-06-25 RX ORDER — SODIUM CHLORIDE 9 MG/ML
INJECTION, SOLUTION INTRAVENOUS CONTINUOUS
Status: DISCONTINUED | OUTPATIENT
Start: 2024-06-25 | End: 2024-06-25 | Stop reason: HOSPADM

## 2024-06-25 RX ORDER — CIPROFLOXACIN 500 MG/1
500 TABLET, FILM COATED ORAL EVERY 12 HOURS SCHEDULED
Status: DISCONTINUED | OUTPATIENT
Start: 2024-06-25 | End: 2024-06-26 | Stop reason: HOSPADM

## 2024-06-25 RX ORDER — HYDROXYZINE HYDROCHLORIDE 10 MG/1
10 TABLET, FILM COATED ORAL 3 TIMES DAILY PRN
Status: DISCONTINUED | OUTPATIENT
Start: 2024-06-25 | End: 2024-06-26 | Stop reason: HOSPADM

## 2024-06-25 RX ORDER — AMOXICILLIN AND CLAVULANATE POTASSIUM 875; 125 MG/1; MG/1
1 TABLET, FILM COATED ORAL 2 TIMES DAILY
Qty: 4 TABLET | Refills: 0 | Status: SHIPPED | OUTPATIENT
Start: 2024-06-25 | End: 2024-06-27

## 2024-06-25 RX ORDER — ALBUMIN (HUMAN) 12.5 G/50ML
25 SOLUTION INTRAVENOUS ONCE
Status: DISCONTINUED | OUTPATIENT
Start: 2024-06-25 | End: 2024-06-26 | Stop reason: HOSPADM

## 2024-06-25 RX ORDER — POTASSIUM CHLORIDE 20 MEQ/1
20 TABLET, EXTENDED RELEASE ORAL 2 TIMES DAILY
Qty: 180 TABLET | Refills: 1 | Status: SHIPPED | OUTPATIENT
Start: 2024-06-25

## 2024-06-25 RX ORDER — SODIUM CHLORIDE 9 MG/ML
INJECTION, SOLUTION INTRAVENOUS CONTINUOUS
Status: DISCONTINUED | OUTPATIENT
Start: 2024-06-25 | End: 2024-06-25

## 2024-06-25 RX ORDER — POTASSIUM CHLORIDE 20 MEQ/1
40 TABLET, EXTENDED RELEASE ORAL ONCE
Status: DISCONTINUED | OUTPATIENT
Start: 2024-06-25 | End: 2024-06-26 | Stop reason: HOSPADM

## 2024-06-25 RX ORDER — AMOXICILLIN AND CLAVULANATE POTASSIUM 500; 125 MG/1; MG/1
1 TABLET, FILM COATED ORAL EVERY 12 HOURS SCHEDULED
Status: DISCONTINUED | OUTPATIENT
Start: 2024-06-25 | End: 2024-06-26 | Stop reason: HOSPADM

## 2024-06-25 RX ORDER — AMOXICILLIN AND CLAVULANATE POTASSIUM 875; 125 MG/1; MG/1
1 TABLET, FILM COATED ORAL EVERY 12 HOURS SCHEDULED
Status: DISCONTINUED | OUTPATIENT
Start: 2024-06-25 | End: 2024-06-25 | Stop reason: SDUPTHER

## 2024-06-25 RX ADMIN — Medication 100 MG: at 10:13

## 2024-06-25 RX ADMIN — SODIUM CHLORIDE, PRESERVATIVE FREE 10 ML: 5 INJECTION INTRAVENOUS at 10:13

## 2024-06-25 RX ADMIN — POTASSIUM BICARBONATE 40 MEQ: 782 TABLET, EFFERVESCENT ORAL at 10:22

## 2024-06-25 RX ADMIN — BUMETANIDE 1 MG: 1 TABLET ORAL at 10:13

## 2024-06-25 RX ADMIN — APIXABAN 2.5 MG: 5 TABLET, FILM COATED ORAL at 10:13

## 2024-06-25 RX ADMIN — FOLIC ACID 1 MG: 5 INJECTION, SOLUTION INTRAMUSCULAR; INTRAVENOUS; SUBCUTANEOUS at 10:15

## 2024-06-25 RX ADMIN — ATORVASTATIN CALCIUM 40 MG: 40 TABLET, FILM COATED ORAL at 10:16

## 2024-06-25 RX ADMIN — TAMSULOSIN HYDROCHLORIDE 0.4 MG: 0.4 CAPSULE ORAL at 10:13

## 2024-06-25 RX ADMIN — DIGOXIN 125 MCG: 0.12 TABLET ORAL at 10:13

## 2024-06-25 NOTE — PROCEDURES
PROCEDURE NOTE  Date: 6/25/2024   Name: Tato Cannon  YOB: 1940    Procedures  12 lead EKG completed. Results handed to Brianne Fox CET

## 2024-06-25 NOTE — CARE COORDINATION
6/25/24, 12:10 PM EDT    Patient goals/plan/ treatment preferences discussed by  and .  Patient goals/plan/ treatment preferences reviewed with patient/ family.  Patient/ family verbalize understanding of discharge plan and are in agreement with goal/plan/treatment preferences.  Understanding was demonstrated using the teach back method.  AVS provided by RN at time of discharge, which includes all necessary medical information pertaining to the patients current course of illness, treatment, post-discharge goals of care, and treatment preferences.     Services At/After Discharge: DME, Home Health, Nursing service, OT, and PT  Plans home w son Silas (who works); CHF Clinic, family prefers new [facility] (nsg, therapy after choices offered on Wednesday/Thursday when son off work as patient very [place], please call son to make HH appointment;) when medically cleared (replacing fluids for CRYSTAL) messaged Lorene [facility] Liaison), SR Anjana E Liaison, Araceli brewster RN    Post-acute (PAC) provider list was provided to patient. Patient was informed of their freedom to choose PAC provider. Discussed and offered to show the patient the relevant PAC Providers quality and resource use measures on Medicare Compare web site via computer based on patient's goals of care and treatment preferences. Questions regarding selection process were answered.

## 2024-06-25 NOTE — DISCHARGE SUMMARY
software. It may contain   minor errors which are inherent in voice recognition technology.**      Electronically signed by Dr. Mercedez Amaro       LIVER   Final Result      1. Trace ascites.      2. Subtle nodular contour to the liver. No liver masses.               **This report has been created using voice recognition software. It may contain   minor errors which are inherent in voice recognition technology.**      Electronically signed by Dr. Vee Riggins      Vascular duplex lower extremity venous bilateral   Final Result   Negative for bilateral lower extremity deep vein thrombosis.      This document has been electronically signed by: Juan Francisco Bowen MD on    06/20/2024 06:19 PM      Technique Used: Duplex examination performed utilizing grayscale, color    and spectral analysis.           Consults:   PHARMACY TO DOSE VANCOMYCIN  IP CONSULT TO CARDIOLOGY  IP CONSULT TO HEART FAILURE NURSE/COORDINATOR    Disposition:  Patient opted to leave AMA  Condition at Discharge:  AMA    Code Status:  Full Code     Patient Instructions:    Discharge lab work: None  Activity: activity as tolerated  Diet: ADULT DIET; Regular; Low Sodium (2 gm); 2000 ml      Follow-up visits:   Kavin العراقي MD  [address]  [address]  [place] [facility] [address]  [phone]    Go on 7/17/2024  Your appointment time is at 2p on July 17th., Please arrive 15 minutes prior to your appointment, Bring medications, photo ID and insurance card    Bebe Romano, APRN - CNP  [address]  [place] [id number] [phone]    Go on 6/27/2024  Your appointment time is at 1:30p on Thursday June 27th., Please arrive 15 minutes prior to your appointment, Bring medications, photo ID and insurance card    [facility]  [address] [id number] [phone]    As needed for nursing, therapy    [facility]  [address] [id number] [phone]  Follow up  As needed for Bernabe Carlos,

## 2024-06-25 NOTE — DISCHARGE INSTR - DIET

## 2024-06-25 NOTE — CARE COORDINATION
06/25/24 1211   Services At/After Discharge   Transition of Care Consult (CM Consult) Home Health   Services At/After Discharge PT;Nursing services;DME;OT   Condition of Participation: Discharge Planning   The Patient and/or Patient Representative was provided with a Choice of Provider? Patient Representative   Name of the Patient Representative who was provided with the Choice of Provider and agrees with the Discharge Plan?  nisa Humphries   The Patient and/Or Patient Representative agree with the Discharge Plan? Yes   Freedom of Choice list was provided with basic dialogue that supports the patient's individualized plan of care/goals, treatment preferences, and shares the quality data associated with the providers?  Yes

## 2024-06-26 LAB
BACTERIA SPEC AEROBE CULT: ABNORMAL
BACTERIA SPEC ANAEROBE CULT: ABNORMAL
GRAM STN SPEC: ABNORMAL
ORGANISM: ABNORMAL

## 2024-06-26 NOTE — PROGRESS NOTES
Pharmacy Renal Adjustment    Pharmacy renally adjusted the following medication(s) per P&T approved policy: apixaban    Recent Labs     06/24/24  1503 06/25/24  0525   BUN 31* 34*   CREATININE 1.6* 1.9*     Estimated Creatinine Clearance: 28 mL/min (A) (based on SCr of 1.9 mg/dL (H)).    Assessment:  SCr increasing    Plan:   Decrease apixaban from 5 mg BID to 2.5 mg BID    Please call pharmacy with any questions.    Kim Urban, SavanaD, BCPS   6/25/2024  7:46 AM      
      Pharmacy Renal Adjustment    Pharmacy renally adjusted the following medication(s) per P&T approved policy: augmentin    Recent Labs     06/24/24  1503 06/25/24  0525   BUN 31* 34*   CREATININE 1.6* 1.9*     Estimated Creatinine Clearance: 28 mL/min (A) (based on SCr of 1.9 mg/dL (H)).    Assessment:  CRYSTAL    Plan:   Decrease Augmentin from 875 mg BID to 500 mg BID    Please call pharmacy with any questions.    Kim Urban, PharmD, BCPS   6/25/2024  4:13 PM    
    Hospitalist Progress Note  Internal Medicine Resident      Patient: Tato Cannon 84 y.o. male      Unit/Bed: -06/006-A    Admit Date: 6/20/2024      ASSESSMENT AND PLAN  Bilateral lower extremity cellulitis: Reportedly having left lower extremity leg wound about 3 months ago, progressively getting worse.  Presenting with bilateral erythematous, edematous leg wounds with seeping.  No recent antibiotics.  DVT scan is negative.  Received vancomycin and Zosyn in the ED.  Continue Vanco and Zosyn  Blood cultures x 2  Wound care    Lactic acidosis: Resolved.  Initial lactic acid 2.2.  Received 100 cc saline bolus in the ED repeat lactate 1.3    CRYSTAL: Baseline creatinine 0.9, current creatinine 1.3  Will hold off on IV fluids likely cardiorenal    HFrEF: Acute exacerbation?.  Ejection fraction 2/2/2024 showing EF 40 to 45%, moderate global hypokinesis, severe hypokinesis of apical anterior.  Home medications include Diovan 40 mg daily, Toprol 100 mg oral twice daily, Aldactone 12 mg oral daily, Bumex 0.5 mg oral daily. He follows with Dr. العراقي in heart failure clinic outpatient.  Presenting with +1 edema bilateral lower extremities cellulitis versus fluid overload.  Bumex 1 mg IV twice daily  BNP 7035    Permanent A-fib: [id number], on Eliquis at home.  Failed DCCV in the past.  Metoprolol  mg daily  Digoxin 125 mcg daily  Eliquis 5 mg twice daily  Cardiac echo ordered  Patient started on amiodarone drip   Cardiology consulted appreciate recommendations    Normocytic anemia: Baseline hemoglobin 11-12, hemoglobin on presentation 10.8.  No signs of active bleeding, patient is tachycardic.    History of elevated alk phos: Noted alk phos today 350  Liver ultrasound ordered    Hypertension: Toprol 100 mg daily, valsartan 40 mg daily, Aldactone 25 mg daily    HLD: Lipitor 40 mg daily    Hypothyroidism: Synthroid 25 mcg daily    BPH: Flomax 0.4 mg daily    LDA: []CVC / []PICC / []Midline / []Mota / []Drains 
    Hospitalist Progress Note  Internal Medicine Resident      Patient: Tato Cannon 84 y.o. male      Unit/Bed: -08/008-A    Admit Date: 6/20/2024        ASSESSMENT AND PLAN  Acute systolic dysfunction leading to volume overload further complicated by hypoalbuminemia.  Improving.  Continue Bumex 2 mg IV twice daily.  Will give a dose of Zaroxolyn 5 mg.  Echocardiogram with drop in EF with 30 to 35% EF. May need ischemic workup as an outpatient.  Not sure if he will be able to tolerate GDMT.  Home medications include Diovan 40 mg daily, Toprol 100 mg oral twice daily, Aldactone 12 mg oral daily, Bumex 0.5 mg oral daily. He follows with Dr. العراقي in heart failure clinic outpatient.  Increase Bumex to 2 mg twice daily.  Will give another dose of albumin.    Bilateral lower extremity cellulitis: Right greater than left.  Discharge/output noted.  Pending final cultures. continue broad-spectrum antibiotics.  Reportedly having left lower extremity leg wound about 3 months ago, progressively getting worse.  Presenting with bilateral erythematous, edematous leg wounds with seeping.  No recent antibiotics.  DVT scan is negative.  Received vancomycin and Zosyn in the ED.  Continue Vanco and Zosyn  Blood cultures x 2  Wound care    Lactic acidosis: Resolved.  Initial lactic acid 2.2.  Received 100 cc saline bolus in the ED repeat lactate 1.3    CRYSTAL: Resolved.  Likely prerenal etiology also cardiorenal.  Continue diuretics, avoid nephrotoxic substances.   Patient has to accept the risk.  Discussed with the family.      Permanent A-fib: [id number], on Eliquis at home.  Failed DCCV in the past.  Metoprolol  mg daily  Digoxin 125 mcg daily  Eliquis 5 mg twice daily  Amiodarone/stop by cardiology, monitor LFTs  Cardiology consulted appreciate recommendations    Normocytic anemia: Baseline hemoglobin 11-12, hemoglobin on presentation 10.8.  No signs of active bleeding, patient is tachycardic.    Nodular contour of 
    Hospitalist Progress Note  Internal Medicine Resident      Patient: Tato Cannon 84 y.o. male      Unit/Bed: -08/008-A    Admit Date: 6/20/2024      ASSESSMENT AND PLAN  Acute systolic dysfunction leading to volume overload, complicated by hypoalbuminemia: Improving.  Continue Bumex 2 mg IV daily, given 1 dose of Zaroxolyn 5 mg.  Echo showing EF 30 to 35%, will need ischemic workup as outpatient.  Likely unable to tolerate GDMT. Home medications include Diovan 40 mg daily, Toprol 100 mg oral twice daily, Aldactone 12 mg oral daily, Bumex 0.5 mg oral daily.   He follows with Dr. العراقي in heart failure clinic outpatient.  Bumex 1 mg twice daily oral    Bilateral lower extremity cellulitis: Right worse than left.  Positive for discharge.  Pending final cultures, on broad-spectrum antibiotics for now vancomycin day 5.  Reports having lower extremity wound about 3 months ago, progressively getting worse presenting with bilateral erythematous edematous leg wounds with seeping.  DVT scan is negative.  Continue vancomycin start date 6/21/2024  Started Rocephin 6/24/2024  Cultures positive for Staph aureus, Morganella Morgagni, Aerococcus viridans    Permanent A-fib: [id number], on Eliquis at home.  Failed DCCV in the past.  Toprol- mg daily, dig 125 daily Eliquis 5 mg daily, amiodarone DC'd by cardiology (monitor LFTs).    Cardiology following  Will follow digoxin level in the morning    Normocytic anemia: Baseline hemoglobin 11-12, hemoglobin presentation 10.8 signs of active bleeding, patient is tachycardic.    Chronically elevated alk phos: Liver ultrasound showing nodular contour of liver, hepatitis panel negative.    Follow-up with GI as outpatient for concerns of liver cirrhosis    Hypertension: Toprol 100 mg daily  Hold Aldactone and Diovan    Hyperlipidemia: Lipitor 40 mg daily    Hypothyroidism: Synthroid 25 mcg daily    BPH: Flomax 0.4 mg daily    Lactic acidosis resolved    CRYSTAL: 
    [facility] Nurse  Progress Note       Tato Cannon  AGE: 84 y.o.   GENDER: male  : 1940  UNIT: 7K-06/006-A  TODAY'S DATE:  2024  ADMISSION DATE: 2024  4:26 PM    Subjective   Reason for C Evaluation and Assessment: MYCHAL Cannon is a 84 y.o. male referred by:   [] Physician/PA/APRN  [x] Nursing  [] Other:     Wound Identification:  Wound Type:traumatic and partial thickness  Wound Location: BLE  Modifying factors: fluid overload    Objective     Reji Risk Score: Reji Scale Score: 20      Assessment     Encounter: Present to pt room from request of nurse for BLE. Pt wound photos and assessment below. Pt has weeping areas to lower legs with a couple of traumatic wounds from shoe (heel) and unknown origin to left lateral leg as well as left lateral heel. Covered those areas with bordered foam dressings. Would recommend changes every other day. Pt has weeping lower extremities and is actively being diuresed per primary RN. Primary RN states he has changed chux pads earlier and pt is draining pretty well at the moment. Would recommend leaving legs on chux pads and changing chux pads as needed allowing partial thickness wounds to drain and dry. When pt weeping slows and partial thickness areas dry, reach out to provider regarding compression orders. Would recommend cuticerin to any open areas followed by kerlix then ACE wraps from base of toes to below bend of knee with 50% compression/50% overlay if pt appropriate for compressive therapy. Unsure of plan regarding discharge, however, would recommend pt to follow up with PCP for follow up and potential assessment for long term compression stockings or device. Call wound ostomy as needed.    24    Wound type: left lateral healing traumatic wound  Wound size: 1.8cm x 0.3cm   Undermining or Tunneling: none  Wound assessment/color: yellow  Drainage amount: small  Drainage description: serosanguinous  Odor: 
[facility]  INPATIENT OCCUPATIONAL THERAPY  STRZ ICU STEPDOWN TELEMETRY 4K  EVALUATION    Time:   Time In: 830  Time Out: 0902  Timed Code Treatment Minutes: 23 Minutes  Minutes: 32          Date: 2024  Patient Name: Tato Cannon,   Gender: male      MRN: [id number]  : 1940  (84 y.o.)  Referring Practitioner: Shun Simon MD  Diagnosis: cellulitis and abscess of leg  Additional Pertinent Hx: 84-year-old male PMH permanent A-fib on Eliquis, HFrEF, hypertension, hyperlipidemia, hypothyroidism presented to [facility] secondary to leg swelling, currently being treated for bilateral lower extremity cellulitis.     Patient is hard of hearing.  Reportedly patient has hit his left leg on a chair about 3 months ago, since then has been getting a worsening wound and edema of the leg.  Bilateral erythematous and warm to touch legs    Restrictions/Precautions:  Restrictions/Precautions: Up as Tolerated, Fall Risk  Position Activity Restriction  Other position/activity restrictions: very [place]    Subjective  Chart Reviewed: Yes, Orders, Progress Notes, History and Physical    Subjective: Nurse approved OT eval. Pt in bed on OT arrival, very [place]. Pt is agreeable to OOB activity and OT eval with education on purpose of therapy.    Pain: does not c/o pain     Vitals: Vitals not assessed per clinical judgement, see nursing flowsheet    Social/Functional History:  Lives With: Son  Type of Home: House  Home Layout: One level, Work area in basement  Home Access: Stairs to enter without rails  Entrance Stairs - Number of Steps: 4 HARINDER, 11 steps to basement. Pt reports he usually goes to the basement 3-4 times per day.  Home Equipment: None   Bathroom Shower/Tub: Tub/Shower unit  Bathroom Equipment: Shower chair       ADL Assistance: Independent  Homemaking Assistance: Independent  Homemaking Responsibilities: Yes  Ambulation Assistance: Independent  Transfer Assistance: Independent    Active 
Cardiology Progress Note      Patient:  Tato Cannon  YOB: 1940  MRN: [id number]   Acct: [id number]  Admit Date:  6/20/2024  Primary Cardiologist:  Seen by Dr Sotomayor  Per Dr Sotomayor's consult note  CHIEF COMPLAINT: Leg swelling     Reason for consult: permanent afib, HFrEF     HPI: Tato Cannon is a 84 y.o. male with a PMH of permanent Afib, HTN, HLD, HFrEF(40 - 45% on 2/3/24) who was admitted 6/20 for bilateral lower extremity cellulitis.  At the bedside, patient is denying any symptoms of dizziness, lightheadedness, chest pain, shortness of breath.  Admits to memory problems.  Does have significant LE edema bilaterally.     Labs:  Digoxin level: <0.3.  Creatinine:1.3  K: 4.1  Magnesium: 1.7  Troponin trend: None  BNP: 7035  HGB: 10.4     EKGs:  6/20/21 @ 1636: Atrial fibrillation with rapid ventricular response with premature ventricular or aberrantly conducted complexes Left axis deviation Incomplete right bundle branch block Inferior infarct (cited on or before 18-MAY-2023) Cannot rule out Anterior infarct (cited on or before 18-MAY-2023) Abnormal ECG Confirmed by JENNIFER ARENAS ([id number]) on 6/21/2024 8:34:37 AM     Transthoracic Echo:  2/3/24 - Moderately reduced left ventricular systolic function with a visually estimated EF of 40 - 45%. Left ventricle size is normal. Normal wall thickness. Moderate global hypokinesis present. Severe hypokinesis of the following segments: apical anterior. Normal diastolic function.      Prior Left Heart Cath:  5/19/23 - IMPRESSION: No significant coronary artery disease.     Subjective (Events in last 24 hours):   Pt with less SOB.  Still with pitting edema in his legs.  Afib rate is better.  Denies any CP.        6/22/2024  Pt states that his legs are less sore.  Edema has decreased.  Rate in the 90's low 100's today.    Denies any CP, SOB or palpitations.    Objective:   /78   Pulse 94   Temp 97.5 °F (36.4 °C) (Oral)   Resp 18   Ht 1.829 m 
Cardiology Progress Note      Patient:  Tato Cannon  YOB: 1940  MRN: [id number]   Acct: [id number]  Admit Date:  6/20/2024  Primary Cardiologist: dr gresham    Note per dr su \"Reason for consult: permanent afib, HFrEF     HPI: Tato Cannon is a 84 y.o. male with a PMH of permanent Afib, HTN, HLD, HFrEF(40 - 45% on 2/3/24) who was admitted 6/20 for bilateral lower extremity cellulitis.  At the bedside, patient is denying any symptoms of dizziness, lightheadedness, chest pain, shortness of breath.  Admits to memory problems.  Does have significant LE edema bilaterally.\"    Subjective (Events in last 24 hours):   Very hard of hearing  Awake and alert.  NAD. No cp or sob. Edema much improved. No orthopnea  On RA  Wants to go home  Net I/o -8.7 L    Objective:   BP (!) 95/59   Pulse 82   Temp 97.3 °F (36.3 °C) (Oral)   Resp 20   Ht 1.829 m (6')   Wt 66.8 kg (147 lb 4.3 oz)   SpO2 98%   BMI 19.97 kg/m²        TELEMETRY: afib cvr    Physical Exam:  General Appearance: alert and oriented to person, place and time, in no acute distress  Cardiovascular: irregularly irregular  Pulmonary/Chest: clear to auscultation bilaterally- no wheezes, rales or rhonchi, normal air movement, no respiratory distress  Abdomen: soft, non-tender, non-distended, normal bowel sounds, no masses Extremities: +1 ble edema, pulse   Skin: warm and dry, some erythema of ble   Head: normocephalic and atraumatic  Eyes: pupils equal, round, and reactive to light  Neck: supple and non-tender without mass, no thyromegaly     Medications:    apixaban  2.5 mg Oral BID    potassium chloride  40 mEq Oral Once    albumin human 25%  25 g IntraVENous Once    [Held by provider] bumetanide  1 mg Oral BID    cefTRIAXone (ROCEPHIN) IV  1,000 mg IntraVENous Q24H    metoprolol succinate  100 mg Oral Daily    [Held by provider] sodium chloride  250 mL IntraVENous Once    thiamine  100 mg Oral Daily    folic acid  1 mg IntraMUSCular 
Cardiology Progress Note      Patient:  Tato Cannon  YOB: 1940  MRN: [id number]   Acct: [id number]  Admit Date:  6/20/2024  Primary Cardiologist: dr gresham    Note per dr su \"Reason for consult: permanent afib, HFrEF     HPI: Tato Cannon is a 84 y.o. male with a PMH of permanent Afib, HTN, HLD, HFrEF(40 - 45% on 2/3/24) who was admitted 6/20 for bilateral lower extremity cellulitis.  At the bedside, patient is denying any symptoms of dizziness, lightheadedness, chest pain, shortness of breath.  Admits to memory problems.  Does have significant LE edema bilaterally.\"    Subjective (Events in last 24 hours): pt is very hard of hearing.  Awake and alert.  NAD. No cp or sob. Edema is improving.   No orthopnea  On RA  He wants to go home today    Net I/o -7.3 L    Objective:   /71   Pulse 75   Temp 97.9 °F (36.6 °C) (Oral)   Resp 20   Ht 1.829 m (6')   Wt 80 kg (176 lb 5.9 oz)   SpO2 97%   BMI 23.92 kg/m²        TELEMETRY: afib cvr    Physical Exam:  General Appearance: alert and oriented to person, place and time, in no acute distress  Cardiovascular: irregularly irregular  Pulmonary/Chest: clear to auscultation bilaterally- no wheezes, rales or rhonchi, normal air movement, no respiratory distress  Abdomen: soft, non-tender, non-distended, normal bowel sounds, no masses Extremities: +1 ble edema, pulse   Skin: warm and dry, some erythema of ble   Head: normocephalic and atraumatic  Eyes: pupils equal, round, and reactive to light  Neck: supple and non-tender without mass, no thyromegaly     Medications:    potassium chloride  80 mEq Oral Once    potassium chloride  10 mEq IntraVENous Q2H    [Held by provider] bumetanide  2 mg IntraVENous BID    [Held by provider] bumetanide  0.5 mg Oral Daily    metoprolol succinate  100 mg Oral Daily    [Held by provider] sodium chloride  250 mL IntraVENous Once    thiamine  100 mg Oral Daily    folic acid  1 mg IntraMUSCular Daily    
Dr. Simon at bedside. Discussed a-fib with RVR ongoing along with BP and bumex. Ok to give iv bumex this am. No further orders. Continue to monitor HR. Metoprolol and Digoxin given this am.  
Educated patient regarding heart failure management by explaining the importance of obtaining daily weights at the same time every day with approximately the same amount of clothing, how to recognize symptoms, low sodium diet and taking prescribed medications as ordered. Patient was given our heart failure booklet. Patient was receptive to the education given and verbalized understanding. Patient established in CHF clinic, was scheduled for 6/24/24 but was hospitalized. Scheduled for 7/9/24  
[facility]   Pharmacy Pharmacokinetic Monitoring Service - Vancomycin    Indication: SSTI  Target Concentration: Goal AUC/IONA 400-600 mg*hr/L  Day of Therapy: 3  Additional Antimicrobials: Zosyn    Pertinent Laboratory Values:   Wt Readings from Last 1 Encounters:   06/22/24 81 kg (178 lb 9.2 oz)     Temp Readings from Last 1 Encounters:   06/22/24 98.2 °F (36.8 °C) (Oral)     Estimated Creatinine Clearance: 44 mL/min (A) (based on SCr of 1.4 mg/dL (H)).  Recent Labs     06/21/24  0655 06/21/24  1256 06/22/24  0356   CREATININE 1.4* 1.3* 1.4*   BUN 29* 28* 29*   WBC 9.9  --  8.8     Pertinent Cultures:  Date Source Results   6/20/24 Blood x 2 ngtd   6/20/24 Leg wound Mixed - gr + cocci and gr - bacilli   MRSA Nasal Swab: N/A. Non-respiratory infection.    Recent vancomycin administrations                     vancomycin (VANCOCIN) 1250 mg in sodium chloride 0.9% 250 mL IVPB (mg) 1,250 mg New Bag 06/21/24 2010    vancomycin (VANCOCIN) 2,000 mg in sodium chloride 0.9 % 500 mL IVPB (mg) 2,000 mg New Bag 06/20/24 1827                    Assessment:  Date/Time Current Dose Concentration Timing of Concentration (h) AUC C trough,ss   6/22/2024 1250mg q24h 17 7 hr 46 min 486 13.1   Note: Serum concentrations collected for AUC dosing may appear elevated if collected in close proximity to the dose administered, this is not necessarily an indication of toxicity    Plan:  Current dosing regimen is therapeutic  Continue current dose of 1250mg q24h  Repeat vancomycin concentration ordered not ordered yet  Pharmacy will continue to monitor patient and adjust therapy as indicated    Thank you for the consult,  Cathy Fowler RPh, BCPS, BCGP  6/22/2024     5:38 AM      
[facility]   Pharmacy Pharmacokinetic Monitoring Service - Vancomycin     Tato Cannon is a 84 y.o. male starting on vancomycin therapy for Skin and Soft Tissue Infection. Pharmacy consulted by Dr. Amaro for monitoring and adjustment.    Target Concentration: Goal AUC/IONA 400-600 mg*hr/L    Additional Antimicrobials: Zosyn    Pertinent Laboratory Values:   Wt Readings from Last 1 Encounters:   06/20/24 79.4 kg (175 lb)     Temp Readings from Last 1 Encounters:   06/20/24 98.7 °F (37.1 °C) (Oral)     Estimated Creatinine Clearance: 56 mL/min (based on SCr of 1.1 mg/dL).  Recent Labs     06/20/24  1640   CREATININE 1.1   BUN 31*   WBC 10.6     Pertinent Cultures:  Date Source Results   6/20/2024 BC#1&2 Sent   MRSA Nasal Swab: N/A. Non-respiratory infection.    Plan:  Dosing recommendations based on Bayesian software  Received Vancomycin IV 2000 mg x1 on 6/20/2024 at 1827. To be followed by Vancomycin IV 1250 mg q24h.  Anticipated AUC of 472 and trough concentration of 12.2 at steady state  Renal labs as indicated   Vancomycin concentration not ordered @ this time   Pharmacy will continue to monitor patient and adjust therapy as indicated    Thank you for the consult,  Esthela Mazariegos RPh 6/20/2024 11:38 PM     
Notified house supervisor Ruma regarding patient leaving AMA. Follow ups, home health, new medications, and walker supplied for patient.   
Patient is wanting to leave Against Medical Advice (AMA). Physician notified and explained the risks and alternatives to leaving AMA. Patient continues to want to leave AMA. Patient educated on discharge instructions, medications, outpatient tests/procedures they have, and follow up appointments. No further questions or concerns voiced by patient. Patient discharged via son by car home.   
Patient refused life vest at discharge and oral potassium.   
Patient refusing 2nd IV line for amiodarone drip. Patient informed that zosyn is not compatible with amiodarone and that he cannot get both medications without a second IV line. Patient continues to refuse. Dr. Everett and Dr. Simon notified.   
Patient's potassium this morning is 3.4 and need to be replaced. Educated the patient the effect of having low potassium and offered 2 options of oral forms. However, patient refused taking it for now and he will think about it again later.   
Report called to Julian HANNAH at this time on 4K. All belongings packed and ready for transport. Await room final clean to send to step down.     1706: transport placed for transfer to 4K.  
Tato Cannon was evaluated today and a DME order was entered for a wheeled walker because he requires this to successfully complete daily living tasks of toileting, personal cares, ambulating, grooming, hygiene, and meal preparation.  A wheeled walker is necessary due to the patient's unsteady gait, upper body weakness, and inability to  an ambulation device; and he can ambulate only by pushing a walker instead of a lesser assistive device such as a cane, crutch, or standard walker.  The need for this equipment was discussed with the patient and he understands and is in agreement.   
Tato is a 84 year old male admitted on 4K 8 at [facility]. Patient is alone and engaged this  in conversation. Patient said he is admitted to the hospital due to an infection in his legs. Patient said the antibiotic seems to be working in clearing out the infection. Patient said he is hopeful and anticipate being discharged as his situation continue to improves. Patient said he is hoping the medical team give an update on what is next for him.     This  offered spiritual and emotional support including encouragement, prayer and active listening. Spiritual Care  team will continue to provide spiritual and emotional support to patient and family.   
This RN educated patient about the albumin medication on how it will help with the extra fluid in his system on top of the Bumex the he is having. However, patient refused it and said that he will wait for his doctor to talk about it tomorrow and that he is not the kind of patient who just take everything. MD notified at 2214.   
   amiodarone 0.5 mg/min (06/22/24 3640)    sodium chloride      Scheduled Medications    bumetanide  2 mg IntraVENous BID    albumin human 25%  25 g IntraVENous Once    [Held by provider] bumetanide  0.5 mg Oral Daily    metoprolol succinate  100 mg Oral Daily    [Held by provider] sodium chloride  250 mL IntraVENous Once    thiamine  100 mg Oral Daily    folic acid  1 mg IntraMUSCular Daily    apixaban  5 mg Oral BID    atorvastatin  40 mg Oral Daily    digoxin  125 mcg Oral Daily    [Held by provider] levothyroxine  25 mcg Oral Daily    [Held by provider] spironolactone  25 mg Oral Daily    tamsulosin  0.4 mg Oral Daily    [Held by provider] valsartan  40 mg Oral Daily    sodium chloride flush  5-40 mL IntraVENous 2 times per day    piperacillin-tazobactam  3,375 mg IntraVENous Q8H    vancomycin (VANCOCIN) intermittent dosing (placeholder)   Other RX Placeholder    vancomycin  1,250 mg IntraVENous Q24H    PRN Meds: sodium chloride flush, sodium chloride, potassium chloride **OR** potassium alternative oral replacement **OR** potassium chloride, magnesium sulfate, ondansetron **OR** ondansetron, polyethylene glycol, acetaminophen **OR** acetaminophen    Exam:  BP 97/65   Pulse 97   Temp 97.9 °F (36.6 °C) (Oral)   Resp 16   Ht 1.829 m (6')   Wt 81 kg (178 lb 9.2 oz)   SpO2 95%   BMI 24.22 kg/m²     Physical Exam  Constitutional:       General: He is not in acute distress.     Appearance: Normal appearance. He is not diaphoretic.   HENT:      Head: Normocephalic and atraumatic.  Very hard of hearing  Cardiovascular:      Rate and Rhythm: Normal rate and regular rhythm.      Pulses: Normal pulses.      Heart sounds: Normal heart sounds. No murmur heard.     No gallop.   Pulmonary:      Effort: Pulmonary effort is normal. No respiratory distress.      Breath sounds: Normal breath sounds. No wheezing, rhonchi or rales.   Abdominal:      General: Abdomen is flat. Bowel sounds are normal.      Palpations: Abdomen 
   ROOM:       0028  ACCOUNT NO:   [id number]                           ADMIT DATE: 05/16/2023  PROVIDER:     Chong Sotomayor MD    DATE OF PROCEDURE:  05/19/2023    INDICATION FOR PROCEDURE:  This is an 83-year-old male patient with  multiple risk factors for coronary artery disease, who was admitted with  acute congestive heart failure, atrial fibrillation, dyspnea on  exertion, and angina equivalent symptoms.  Echocardiogram revealed  borderline ejection fraction at around 50%.  He was referred for left  cardiac catheterization.    PROCEDURES PERFORMED:  1.  Left cardiac catheterization with selective coronary angiogram.  2.  Left ventriculogram.    DESCRIPTION OF PROCEDURE:  After informed consent, the patient was  brought to the cardiac catheterization room.  He was prepped and draped  in a sterile fashion.  A 2% lidocaine was injected in the skin and  subcutaneous tissue overlying the right radial artery.  Under ultrasound  guidance using modified Seldinger technique, access was obtained in the  right radial artery.  A 5/6 Slender sheath was inserted.  Standard  antithrombotic/antispasmodic medications were given.  I used 6-Marshallese  JR4 and 6-Marshallese JL3.5 diagnostic catheters to complete the procedure.    FINDINGS:  LEFT VENTRICULOGRAM:  No regional wall motion abnormality.  Ejection  fraction of 50%.    HEMODYNAMICS:  Left ventricular end-diastolic pressure 6 mmHg.  No  significant pressure gradient across the aortic valve upon pullback.    CORONARY ANGIOGRAM:  1.  Right coronary artery, relatively small nondominant vessel, patent  without significant stenosis.  2.  Left main coronary artery, patent, gives rise to left circumflex and  left anterior descending arteries.  3.  Left circumflex artery, dominant vessel, patent without significant  stenosis.  4.  Left anterior descending artery, patent without significant  stenosis.    MEDICATIONS:  See MAR.    COMPLICATIONS:  None.    ESTIMATED BLOOD LOSS:  Less than

## 2024-07-09 ENCOUNTER — TELEPHONE (OUTPATIENT)
Dept: CARDIOLOGY CLINIC | Age: 84
End: 2024-07-09

## 2024-07-09 NOTE — TELEPHONE ENCOUNTER
Patient no showed to CHF clinic. Called to r/s appointment- pt unable to hear on the phone. Will wait until son is with him to get appointment r/s

## 2024-07-10 ENCOUNTER — OFFICE VISIT (OUTPATIENT)
Dept: CARDIOLOGY CLINIC | Age: 84
End: 2024-07-10
Payer: MEDICARE

## 2024-07-10 VITALS
BODY MASS INDEX: 21.81 KG/M2 | HEART RATE: 56 BPM | HEIGHT: 72 IN | DIASTOLIC BLOOD PRESSURE: 66 MMHG | WEIGHT: 161 LBS | SYSTOLIC BLOOD PRESSURE: 98 MMHG

## 2024-07-10 DIAGNOSIS — I48.91 ATRIAL FIBRILLATION WITH RAPID VENTRICULAR RESPONSE (HCC): Primary | ICD-10-CM

## 2024-07-10 DIAGNOSIS — I50.42 CHRONIC COMBINED SYSTOLIC AND DIASTOLIC CHF (CONGESTIVE HEART FAILURE) (HCC): ICD-10-CM

## 2024-07-10 PROCEDURE — 99214 OFFICE O/P EST MOD 30 MIN: CPT | Performed by: STUDENT IN AN ORGANIZED HEALTH CARE EDUCATION/TRAINING PROGRAM

## 2024-07-10 PROCEDURE — 1123F ACP DISCUSS/DSCN MKR DOCD: CPT | Performed by: STUDENT IN AN ORGANIZED HEALTH CARE EDUCATION/TRAINING PROGRAM

## 2024-07-10 NOTE — PROGRESS NOTES
[facility] [place] [facility]  [facility] CARDIOLOGY  [address]  [address]  [facility] [address]  Dept: [phone]  Dept Fax: [phone]  Loc: [phone]    Visit Date: 7/10/2024    Tato Cannon is a 84 y.o. male who presents todayfor:  Chief Complaint   Patient presents with    Follow-Up from Hospital     2 week        Cardiologist: Malcom Garcia of HTN, patent coronaries, BLE cellulitis, CHF, permanent afib, NICMP.     HPI: hfu for afib rvr, acute HFrEF. Refused LIFevest. [facility] [employer].   No chest pain, angina, HARDY, orthopnea, PND, sob at rest, palpitations, LE edema, or syncope. His infection has cleared. Tolerating current meds. BP is stable. No dizziness or lightheaded feelings.       No past surgical history on file.  No family history on file.  Social History     Tobacco Use    Smoking status: Former    Smokeless tobacco: Not on file   Substance Use Topics    Alcohol use: Yes      Current Outpatient Medications   Medication Sig Dispense Refill    apixaban (ELIQUIS) 5 MG TABS tablet Take 1 tablet by mouth 2 times daily 60 tablet 3    potassium chloride (KLOR-CON M) 20 MEQ extended release tablet Take 1 tablet by mouth 2 times daily 180 tablet 1    valsartan (DIOVAN) 40 MG tablet Take 1 tablet by mouth daily 30 tablet 5    metoprolol succinate (TOPROL XL) 100 MG extended release tablet Take 1 tablet by mouth daily (Patient not taking: Reported on 6/20/2024) 30 tablet 5    bumetanide (BUMEX) 0.5 MG tablet Take 1 tablet by mouth daily 30 tablet 5    spironolactone (ALDACTONE) 25 MG tablet Take 1 tablet by mouth daily 30 tablet 5    digoxin (LANOXIN) 125 MCG tablet Take 1 tablet by mouth daily (Patient not taking: Reported on 6/20/2024) 30 tablet 0    levothyroxine (SYNTHROID) 25 MCG tablet Take 1 tablet by mouth Daily (Patient not taking: Reported on 6/20/2024) 30 tablet 0    atorvastatin (LIPITOR) 40 MG tablet Take 1 tablet by mouth daily      tamsulosin (FLOMAX) 0.4 MG capsule Take 1

## 2024-08-06 ENCOUNTER — OFFICE VISIT (OUTPATIENT)
Dept: CARDIOLOGY CLINIC | Age: 84
End: 2024-08-06
Payer: MEDICARE

## 2024-08-06 VITALS
BODY MASS INDEX: 25.21 KG/M2 | SYSTOLIC BLOOD PRESSURE: 118 MMHG | HEART RATE: 88 BPM | HEIGHT: 69 IN | WEIGHT: 170.25 LBS | DIASTOLIC BLOOD PRESSURE: 70 MMHG | OXYGEN SATURATION: 98 %

## 2024-08-06 DIAGNOSIS — R60.0 LOWER EXTREMITY EDEMA: ICD-10-CM

## 2024-08-06 DIAGNOSIS — I48.19 PERSISTENT ATRIAL FIBRILLATION (HCC): ICD-10-CM

## 2024-08-06 DIAGNOSIS — I50.22 CHF NYHA CLASS II, CHRONIC, SYSTOLIC (HCC): Primary | ICD-10-CM

## 2024-08-06 DIAGNOSIS — I10 ESSENTIAL HYPERTENSION: ICD-10-CM

## 2024-08-06 DIAGNOSIS — I50.22 CHRONIC SYSTOLIC CONGESTIVE HEART FAILURE (HCC): ICD-10-CM

## 2024-08-06 DIAGNOSIS — I87.2 VENOUS INSUFFICIENCY: ICD-10-CM

## 2024-08-06 LAB
ANION GAP SERPL CALC-SCNC: 11 MEQ/L (ref 8–16)
BUN SERPL-MCNC: 29 MG/DL (ref 7–22)
CALCIUM SERPL-MCNC: 9 MG/DL (ref 8.5–10.5)
CHLORIDE SERPL-SCNC: 102 MEQ/L (ref 98–111)
CO2 SERPL-SCNC: 24 MEQ/L (ref 23–33)
CREAT SERPL-MCNC: 1 MG/DL (ref 0.4–1.2)
DIGOXIN SERPL-MCNC: < 0.3 NG/ML (ref 0.5–2)
GFR SERPL CREATININE-BSD FRML MDRD: 75 ML/MIN/1.73M2
GLUCOSE SERPL-MCNC: 106 MG/DL (ref 70–108)
POTASSIUM SERPL-SCNC: 4.7 MEQ/L (ref 3.5–5.2)
SODIUM SERPL-SCNC: 137 MEQ/L (ref 135–145)

## 2024-08-06 PROCEDURE — 36415 COLL VENOUS BLD VENIPUNCTURE: CPT | Performed by: NURSE PRACTITIONER

## 2024-08-06 PROCEDURE — 1123F ACP DISCUSS/DSCN MKR DOCD: CPT | Performed by: NURSE PRACTITIONER

## 2024-08-06 PROCEDURE — 3078F DIAST BP <80 MM HG: CPT | Performed by: NURSE PRACTITIONER

## 2024-08-06 PROCEDURE — 3074F SYST BP LT 130 MM HG: CPT | Performed by: NURSE PRACTITIONER

## 2024-08-06 PROCEDURE — 99214 OFFICE O/P EST MOD 30 MIN: CPT | Performed by: NURSE PRACTITIONER

## 2024-08-06 RX ORDER — ATORVASTATIN CALCIUM 40 MG/1
40 TABLET, FILM COATED ORAL DAILY
Qty: 30 TABLET | Refills: 11 | Status: SHIPPED | OUTPATIENT
Start: 2024-08-06

## 2024-08-06 ASSESSMENT — ENCOUNTER SYMPTOMS
ABDOMINAL DISTENTION: 0
COUGH: 0
SHORTNESS OF BREATH: 0

## 2024-08-06 NOTE — PROGRESS NOTES
Heart Failure Clinic       Visit Date: 8/6/2024  Cardiologist:  Dr. العراقي  Primary Care Physician: Mao Lake MD  Referred by: hospital    Tato Cannon is a 84 y.o. male who presents today for:  Chief Complaint   Patient presents with    Congestive Heart Failure       HPI:   Tato Cannon is a 84 y.o. male who presents to the office for a follow up patient visit in the heart failure clinic.  Accompanied by no one    TYPE HF: HFrEF 50% >> 40-45% >> 30-35%  Cause: no  Valves:  mild MR, TR  Device: no  HX: AFib (new 5/2023 - Eliquis), HTN, HLD    Dry Wt:  175    Hospitalization:  1/2024 -  Acute systolic dysfunction and other abnormalities could be related to chronic A-fib induced tachycardia, in the setting of likely noncompliance with meds. Discharged on Bumex 2 mg daily.    BB increased and Dig added.     8/2023 - 172#  Feeling good overall.  No fluid on exam  Much confusion on meds - apparently Eliquis and toprol got stopped errorenously d/t pharmacy changes.  Pt also very confused if taking Aldactone.        2/2024 - 168#  Feeling back to baseline.    Very [place] - states he's taking pills, \"he thinks\" correctly  States lost more wt since been home - swelling much improved.       3/2024 - 173#  Quit taking Bumex about month ago.    Not taking Toprol at PM - does not take any meds twice daily.  Taking everything once daily  Little more EDA today.  He's not concerned.  Denies SOB.    Again very [place]    Home weight: not check   Home blood pressure: not check      TODAY 8/2024 - f/u hospitalization.  Was admitted for cellulitis in June, left AMA.  EF lower at 30-35%  170#  Some mild redness today BLE but per pt much improved from previous.  Slight SOB   Denies CP, palpitations.  Walked from back parking lot today  Does not like compression hose d/t heat.    Very [place] = difficult to communicate    Past Medical History:   Diagnosis Date    A-fib ([facility])     CHF (congestive heart failure) ([facility])

## 2024-08-06 NOTE — PATIENT INSTRUCTIONS
You may receive a survey regarding the care you received during your visit.  Your input is valuable to us.  We encourage you to complete and return your survey.  We hope you will choose us in the future for your healthcare needs.    Your nurses today were Nancy.  Office hours:   Mon-Thurs 8-4:30  Friday 8-12  Phone: [phone]    Continue:  Continue current medications  Daily weights and record  Fluid restriction of 2 Liters per day  Limit sodium in diet to around 7511-1230 mg/day  Monitor BP  Activity as tolerated     Call the Heart Failure Clinic for any of the following symptoms:   Weight gain of 3 pounds in 1 day or 5 pounds in 1 week  Increased shortness of breath  Shortness of breath while laying down  Cough  Chest pain  Swelling in feet, ankles or legs  Bloating in abdomen  Fatigue

## 2024-08-06 NOTE — PROGRESS NOTES
Venipuncture obtained from [facility]. Patient tolerated procedure without complications or complaints.

## 2024-10-08 ENCOUNTER — TELEPHONE (OUTPATIENT)
Dept: CARDIOLOGY CLINIC | Age: 84
End: 2024-10-08

## 2024-10-08 NOTE — TELEPHONE ENCOUNTER
Patient no showed to echo 10/7/24  Spoke with DTR Kristel on 10/7/24she stated she would contact patient or brother Silas regarding rescheduling    10/8/24  Left message for son Silas to call office

## 2024-10-10 ENCOUNTER — HOSPITAL ENCOUNTER (OUTPATIENT)
Age: 84
Discharge: HOME | End: 2024-10-12
Payer: MEDICARE

## 2024-10-10 VITALS
DIASTOLIC BLOOD PRESSURE: 70 MMHG | BODY MASS INDEX: 25.18 KG/M2 | WEIGHT: 170 LBS | SYSTOLIC BLOOD PRESSURE: 118 MMHG | HEIGHT: 69 IN

## 2024-10-10 DIAGNOSIS — I50.22 CHRONIC SYSTOLIC CONGESTIVE HEART FAILURE (HCC): ICD-10-CM

## 2024-10-10 LAB
ECHO AO ASC DIAM: 3.3 CM
ECHO AO ASCENDING AORTA INDEX: 1.71 CM/M2
ECHO AV CUSP MM: 1.7 CM
ECHO AV PEAK GRADIENT: 4 MMHG
ECHO AV PEAK VELOCITY: 1 M/S
ECHO AV VELOCITY RATIO: 0.4
ECHO BSA: 1.94 M2
ECHO EST RA PRESSURE: 5 MMHG
ECHO LA AREA 2C: 21.4 CM2
ECHO LA AREA 4C: 23.4 CM2
ECHO LA DIAMETER INDEX: 2.02 CM/M2
ECHO LA DIAMETER: 3.9 CM
ECHO LA MAJOR AXIS: 6 CM
ECHO LA MINOR AXIS: 5.6 CM
ECHO LA VOL BP: 72 ML (ref 18–58)
ECHO LA VOL MOD A2C: 65 ML (ref 18–58)
ECHO LA VOL MOD A4C: 76 ML (ref 18–58)
ECHO LA VOL/BSA BIPLANE: 37 ML/M2 (ref 16–34)
ECHO LA VOLUME INDEX MOD A2C: 34 ML/M2 (ref 16–34)
ECHO LA VOLUME INDEX MOD A4C: 39 ML/M2 (ref 16–34)
ECHO LV EDV A2C: 93 ML
ECHO LV EDV A4C: 112 ML
ECHO LV EDV INDEX A4C: 58 ML/M2
ECHO LV EDV NDEX A2C: 48 ML/M2
ECHO LV EJECTION FRACTION A2C: 24 %
ECHO LV EJECTION FRACTION A4C: 36 %
ECHO LV EJECTION FRACTION BIPLANE: 31 % (ref 55–100)
ECHO LV ESV A2C: 71 ML
ECHO LV ESV A4C: 71 ML
ECHO LV ESV INDEX A2C: 37 ML/M2
ECHO LV ESV INDEX A4C: 37 ML/M2
ECHO LV FRACTIONAL SHORTENING: 19 % (ref 28–44)
ECHO LV INTERNAL DIMENSION DIASTOLE INDEX: 2.49 CM/M2
ECHO LV INTERNAL DIMENSION DIASTOLIC: 4.8 CM (ref 4.2–5.9)
ECHO LV INTERNAL DIMENSION SYSTOLIC INDEX: 2.02 CM/M2
ECHO LV INTERNAL DIMENSION SYSTOLIC: 3.9 CM
ECHO LV ISOVOLUMETRIC RELAXATION TIME (IVRT): 77 MS
ECHO LV IVSD: 1 CM (ref 0.6–1)
ECHO LV MASS 2D: 194 G (ref 88–224)
ECHO LV MASS INDEX 2D: 100.5 G/M2 (ref 49–115)
ECHO LV POSTERIOR WALL DIASTOLIC: 1.2 CM (ref 0.6–1)
ECHO LV RELATIVE WALL THICKNESS RATIO: 0.5
ECHO LVOT PEAK GRADIENT: 1 MMHG
ECHO LVOT PEAK VELOCITY: 0.4 M/S
ECHO MV E VELOCITY: 0.93 M/S
ECHO MV REGURGITANT PEAK GRADIENT: 64 MMHG
ECHO MV REGURGITANT PEAK VELOCITY: 4 M/S
ECHO PV MAX VELOCITY: 0.3 M/S
ECHO PV PEAK GRADIENT: 0 MMHG
ECHO RIGHT VENTRICULAR SYSTOLIC PRESSURE (RVSP): 17 MMHG
ECHO RV INTERNAL DIMENSION: 4.3 CM
ECHO RV TAPSE: 1.2 CM (ref 1.7–?)
ECHO TV E WAVE: 0.8 M/S
ECHO TV REGURGITANT MAX VELOCITY: 1.76 M/S
ECHO TV REGURGITANT PEAK GRADIENT: 12 MMHG

## 2024-10-10 PROCEDURE — 93306 TTE W/DOPPLER COMPLETE: CPT

## 2024-10-11 ENCOUNTER — OFFICE VISIT (OUTPATIENT)
Dept: CARDIOLOGY CLINIC | Age: 84
End: 2024-10-11
Payer: MEDICARE

## 2024-10-11 VITALS
BODY MASS INDEX: 27.11 KG/M2 | WEIGHT: 183 LBS | HEIGHT: 69 IN | HEART RATE: 92 BPM | DIASTOLIC BLOOD PRESSURE: 60 MMHG | SYSTOLIC BLOOD PRESSURE: 114 MMHG

## 2024-10-11 DIAGNOSIS — I50.20 HFREF (HEART FAILURE WITH REDUCED EJECTION FRACTION) (HCC): Primary | ICD-10-CM

## 2024-10-11 PROCEDURE — 99214 OFFICE O/P EST MOD 30 MIN: CPT | Performed by: INTERNAL MEDICINE

## 2024-10-11 PROCEDURE — 1123F ACP DISCUSS/DSCN MKR DOCD: CPT | Performed by: INTERNAL MEDICINE

## 2024-10-11 NOTE — PATIENT INSTRUCTIONS
Your Provider for Today: Dr. العراقي  Your nurses for today: Lance    You may receive a survey regarding the care you received during your visit.  Your input is valuable to us.  We encourage you to complete and return your survey.  We hope you will choose us in the future for your healthcare needs.

## 2024-10-11 NOTE — PROGRESS NOTES
Pt C/O sob  Pt states he itches alot and his top of head is hot all the time.  No medications are the same on his sheet he brought in.       
there is any new onset of  any chest pain, sob, palpitations, lightheadedness, dizziness, orthopnea, PND or pedal edema.   All medication side effects were discussed in details.    Thank youfor allowing me to participate in the care of this patient.   Please do not hesitate to contact me for any further questions.     Return in about 6 months (around 4/11/2025), or if symptoms worsen or fail to improve, for Review testing, Regular follow up.       Electronically signed by Kavin العراقي MD [facility]  10/11/2024 at 1:06 PM EDT

## 2024-12-09 ENCOUNTER — HOSPITAL ENCOUNTER (INPATIENT)
Age: 84
LOS: 22 days | Discharge: NURSING, REHAB OR LONG-TERM CARE | DRG: 199 | End: 2024-12-31
Attending: STUDENT IN AN ORGANIZED HEALTH CARE EDUCATION/TRAINING PROGRAM | Admitting: SURGERY
Payer: MEDICARE

## 2024-12-09 ENCOUNTER — APPOINTMENT (OUTPATIENT)
Dept: CT IMAGING | Age: 84
DRG: 199 | End: 2024-12-09
Payer: MEDICARE

## 2024-12-09 ENCOUNTER — APPOINTMENT (OUTPATIENT)
Dept: GENERAL RADIOLOGY | Age: 84
DRG: 199 | End: 2024-12-09
Payer: MEDICARE

## 2024-12-09 DIAGNOSIS — S22.41XA CLOSED FRACTURE OF MULTIPLE RIBS OF RIGHT SIDE, INITIAL ENCOUNTER: ICD-10-CM

## 2024-12-09 DIAGNOSIS — J90 BILATERAL PLEURAL EFFUSION: ICD-10-CM

## 2024-12-09 DIAGNOSIS — I48.91 ATRIAL FIBRILLATION, UNSPECIFIED TYPE (HCC): ICD-10-CM

## 2024-12-09 DIAGNOSIS — I50.23 ACUTE ON CHRONIC SYSTOLIC CONGESTIVE HEART FAILURE (HCC): ICD-10-CM

## 2024-12-09 DIAGNOSIS — S27.0XXA TRAUMATIC PNEUMOTHORAX, INITIAL ENCOUNTER: ICD-10-CM
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
## 2024-12-09 LAB
ALBUMIN SERPL BCG-MCNC: 3.5 G/DL (ref 3.5–5.1)
ALP SERPL-CCNC: 254 U/L (ref 38–126)
ALT SERPL W/O P-5'-P-CCNC: 32 U/L (ref 11–66)
ANION GAP SERPL CALC-SCNC: 13 MEQ/L (ref 8–16)
AST SERPL-CCNC: 43 U/L (ref 5–40)
BASOPHILS ABSOLUTE: 0 THOU/MM3 (ref 0–0.1)
BASOPHILS NFR BLD AUTO: 0.2 %
BILIRUB SERPL-MCNC: 1.8 MG/DL (ref 0.3–1.2)
BUN SERPL-MCNC: 35 MG/DL (ref 7–22)
CALCIUM SERPL-MCNC: 9 MG/DL (ref 8.5–10.5)
CHLORIDE SERPL-SCNC: 109 MEQ/L (ref 98–111)
CO2 SERPL-SCNC: 22 MEQ/L (ref 23–33)
CREAT SERPL-MCNC: 1 MG/DL (ref 0.4–1.2)
DEPRECATED RDW RBC AUTO: 55.5 FL (ref 35–45)
EOSINOPHIL NFR BLD AUTO: 0.3 %
EOSINOPHILS ABSOLUTE: 0 THOU/MM3 (ref 0–0.4)
ERYTHROCYTE [DISTWIDTH] IN BLOOD BY AUTOMATED COUNT: 16.4 % (ref 11.5–14.5)
FLUAV RNA RESP QL NAA+PROBE: NOT DETECTED
FLUBV RNA RESP QL NAA+PROBE: NOT DETECTED
GFR SERPL CREATININE-BSD FRML MDRD: 74 ML/MIN/1.73M2
GLUCOSE SERPL-MCNC: 110 MG/DL (ref 70–108)
HCT VFR BLD AUTO: 39.3 % (ref 42–52)
HGB BLD-MCNC: 11.7 GM/DL (ref 14–18)
IMM GRANULOCYTES # BLD AUTO: 0.04 THOU/MM3 (ref 0–0.07)
IMM GRANULOCYTES NFR BLD AUTO: 0.4 %
INR PPP: 1.36 (ref 0.85–1.13)
LACTATE SERPL-SCNC: 2.8 MMOL/L (ref 0.5–2)
LYMPHOCYTES ABSOLUTE: 0.8 THOU/MM3 (ref 1–4.8)
LYMPHOCYTES NFR BLD AUTO: 7.6 %
MAGNESIUM SERPL-MCNC: 2.1 MG/DL (ref 1.6–2.4)
MCH RBC QN AUTO: 28.1 PG (ref 26–33)
MCHC RBC AUTO-ENTMCNC: 29.8 GM/DL (ref 32.2–35.5)
MCV RBC AUTO: 94.5 FL (ref 80–94)
MONOCYTES ABSOLUTE: 1.4 THOU/MM3 (ref 0.4–1.3)
MONOCYTES NFR BLD AUTO: 13.8 %
NEUTROPHILS ABSOLUTE: 7.8 THOU/MM3 (ref 1.8–7.7)
NEUTROPHILS NFR BLD AUTO: 77.7 %
NRBC BLD AUTO-RTO: 0 /100 WBC
NT-PROBNP SERPL IA-MCNC: 4522 PG/ML (ref 0–449)
OSMOLALITY SERPL CALC.SUM OF ELEC: 295.5 MOSMOL/KG (ref 275–300)
PLATELET # BLD AUTO: 160 THOU/MM3 (ref 130–400)
PMV BLD AUTO: 10.7 FL (ref 9.4–12.4)
POTASSIUM SERPL-SCNC: 4.7 MEQ/L (ref 3.5–5.2)
PROT SERPL-MCNC: 7.1 G/DL (ref 6.1–8)
RBC # BLD AUTO: 4.16 MILL/MM3 (ref 4.7–6.1)
SARS-COV-2 RNA RESP QL NAA+PROBE: NOT DETECTED
SODIUM SERPL-SCNC: 144 MEQ/L (ref 135–145)
TROPONIN, HIGH SENSITIVITY: 55 NG/L (ref 0–12)
TSH SERPL DL<=0.005 MIU/L-ACNC: 7.4 UIU/ML (ref 0.4–4.2)
WBC # BLD AUTO: 10.1 THOU/MM3 (ref 4.8–10.8)

## 2024-12-09 PROCEDURE — 76376 3D RENDER W/INTRP POSTPROCES: CPT

## 2024-12-09 PROCEDURE — 6820000001 HC L2 TRAUMA SURGERY EVALUATION: Performed by: SURGERY

## 2024-12-09 PROCEDURE — 93005 ELECTROCARDIOGRAM TRACING: CPT | Performed by: STUDENT IN AN ORGANIZED HEALTH CARE EDUCATION/TRAINING PROGRAM

## 2024-12-09 PROCEDURE — 96374 THER/PROPH/DIAG INJ IV PUSH: CPT

## 2024-12-09 PROCEDURE — 36415 COLL VENOUS BLD VENIPUNCTURE: CPT

## 2024-12-09 PROCEDURE — 6360000002 HC RX W HCPCS

## 2024-12-09 PROCEDURE — 2500000003 HC RX 250 WO HCPCS

## 2024-12-09 PROCEDURE — 71046 X-RAY EXAM CHEST 2 VIEWS: CPT

## 2024-12-09 PROCEDURE — 85025 COMPLETE CBC W/AUTO DIFF WBC: CPT

## 2024-12-09 PROCEDURE — 87636 SARSCOV2 & INF A&B AMP PRB: CPT

## 2024-12-09 PROCEDURE — 70450 CT HEAD/BRAIN W/O DYE: CPT

## 2024-12-09 PROCEDURE — 6360000004 HC RX CONTRAST MEDICATION: Performed by: STUDENT IN AN ORGANIZED HEALTH CARE EDUCATION/TRAINING PROGRAM

## 2024-12-09 PROCEDURE — 71260 CT THORAX DX C+: CPT

## 2024-12-09 PROCEDURE — APPSS30 APP SPLIT SHARED TIME 16-30 MINUTES: Performed by: NURSE PRACTITIONER

## 2024-12-09 PROCEDURE — 83880 ASSAY OF NATRIURETIC PEPTIDE: CPT

## 2024-12-09 PROCEDURE — 74177 CT ABD & PELVIS W/CONTRAST: CPT

## 2024-12-09 PROCEDURE — 96375 TX/PRO/DX INJ NEW DRUG ADDON: CPT

## 2024-12-09 PROCEDURE — 83605 ASSAY OF LACTIC ACID: CPT

## 2024-12-09 PROCEDURE — 2060000000 HC ICU INTERMEDIATE R&B

## 2024-12-09 PROCEDURE — 85610 PROTHROMBIN TIME: CPT

## 2024-12-09 PROCEDURE — 80053 COMPREHEN METABOLIC PANEL: CPT

## 2024-12-09 PROCEDURE — 84443 ASSAY THYROID STIM HORMONE: CPT

## 2024-12-09 PROCEDURE — 83735 ASSAY OF MAGNESIUM: CPT

## 2024-12-09 PROCEDURE — 72125 CT NECK SPINE W/O DYE: CPT

## 2024-12-09 PROCEDURE — 99285 EMERGENCY DEPT VISIT HI MDM: CPT

## 2024-12-09 PROCEDURE — 84484 ASSAY OF TROPONIN QUANT: CPT

## 2024-12-09 RX ORDER — IOPAMIDOL 755 MG/ML
80 INJECTION, SOLUTION INTRAVASCULAR
Status: COMPLETED | OUTPATIENT
Start: 2024-12-09 | End: 2024-12-09

## 2024-12-09 RX ORDER — DILTIAZEM HYDROCHLORIDE 5 MG/ML
10 INJECTION INTRAVENOUS ONCE
Status: COMPLETED | OUTPATIENT
Start: 2024-12-09 | End: 2024-12-09

## 2024-12-09 RX ORDER — FENTANYL CITRATE 50 UG/ML
25 INJECTION, SOLUTION INTRAMUSCULAR; INTRAVENOUS ONCE
Status: COMPLETED | OUTPATIENT
Start: 2024-12-09 | End: 2024-12-09

## 2024-12-09 RX ADMIN — IOPAMIDOL 80 ML: 755 INJECTION, SOLUTION INTRAVENOUS at 21:52

## 2024-12-09 RX ADMIN — DILTIAZEM HYDROCHLORIDE 10 MG: 5 INJECTION, SOLUTION INTRAVENOUS at 21:10

## 2024-12-09 RX ADMIN — FENTANYL CITRATE 25 MCG: 50 INJECTION, SOLUTION INTRAMUSCULAR; INTRAVENOUS at 22:38

## 2024-12-09 ASSESSMENT — PAIN - FUNCTIONAL ASSESSMENT: PAIN_FUNCTIONAL_ASSESSMENT: 0-10

## 2024-12-09 ASSESSMENT — PAIN SCALES - GENERAL: PAINLEVEL_OUTOF10: 8

## 2024-12-10 ENCOUNTER — APPOINTMENT (OUTPATIENT)
Dept: GENERAL RADIOLOGY | Age: 84
DRG: 199 | End: 2024-12-10
Payer: MEDICARE

## 2024-12-10 ENCOUNTER — APPOINTMENT (OUTPATIENT)
Age: 84
DRG: 199 | End: 2024-12-10
Payer: MEDICARE

## 2024-12-10 PROBLEM — J90 PLEURAL EFFUSION, BILATERAL: Status: ACTIVE | Noted: 2024-12-10

## 2024-12-10 PROBLEM — S27.321A RIGHT PULMONARY CONTUSION: Status: ACTIVE | Noted: 2024-12-10

## 2024-12-10 PROBLEM — J93.9 PNEUMOTHORAX ON RIGHT: Status: ACTIVE | Noted: 2024-12-10

## 2024-12-10 PROBLEM — S22.41XA CLOSED FRACTURE OF MULTIPLE RIBS OF RIGHT SIDE: Status: ACTIVE | Noted: 2024-12-10
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
## 2024-12-10 PROBLEM — S32.040A COMPRESSION FRACTURE OF L4 VERTEBRA (HCC): Status: ACTIVE | Noted: 2024-12-10

## 2024-12-10 LAB
ALBUMIN SERPL BCG-MCNC: 2.9 G/DL (ref 3.5–5.1)
ALBUMIN SERPL BCG-MCNC: 3 G/DL (ref 3.5–5.1)
ALP SERPL-CCNC: 220 U/L (ref 38–126)
ALP SERPL-CCNC: 222 U/L (ref 38–126)
ALT SERPL W/O P-5'-P-CCNC: 24 U/L (ref 11–66)
ALT SERPL W/O P-5'-P-CCNC: 26 U/L (ref 11–66)
ANION GAP SERPL CALC-SCNC: 13 MEQ/L (ref 8–16)
AST SERPL-CCNC: 25 U/L (ref 5–40)
AST SERPL-CCNC: 30 U/L (ref 5–40)
BILIRUB CONJ SERPL-MCNC: 0.8 MG/DL (ref 0.1–13.8)
BILIRUB CONJ SERPL-MCNC: 0.9 MG/DL (ref 0.1–13.8)
BILIRUB SERPL-MCNC: 1.3 MG/DL (ref 0.3–1.2)
BILIRUB SERPL-MCNC: 1.5 MG/DL (ref 0.3–1.2)
BUN SERPL-MCNC: 34 MG/DL (ref 7–22)
CALCIUM SERPL-MCNC: 8.9 MG/DL (ref 8.5–10.5)
CHLORIDE SERPL-SCNC: 108 MEQ/L (ref 98–111)
CO2 SERPL-SCNC: 20 MEQ/L (ref 23–33)
CREAT SERPL-MCNC: 1.2 MG/DL (ref 0.4–1.2)
DIGOXIN SERPL-MCNC: < 0.3 NG/ML (ref 0.5–2)
ECHO BSA: 2.13 M2
ECHO LA DIAMETER INDEX: 2.06 CM/M2
ECHO LA DIAMETER: 4.3 CM
ECHO LV EDV A4C: 77 ML
ECHO LV EDV INDEX A4C: 37 ML/M2
ECHO LV EF PHYSICIAN: 35 %
ECHO LV EJECTION FRACTION A4C: 43 %
ECHO LV ESV A4C: 44 ML
ECHO LV ESV INDEX A4C: 21 ML/M2
ECHO LV FRACTIONAL SHORTENING: 27 % (ref 28–44)
ECHO LV INTERNAL DIMENSION DIASTOLE INDEX: 2.3 CM/M2
ECHO LV INTERNAL DIMENSION DIASTOLIC: 4.8 CM (ref 4.2–5.9)
ECHO LV INTERNAL DIMENSION SYSTOLIC INDEX: 1.67 CM/M2
ECHO LV INTERNAL DIMENSION SYSTOLIC: 3.5 CM
ECHO LV IVSD: 0.7 CM (ref 0.6–1)
ECHO LV MASS 2D: 137.1 G (ref 88–224)
ECHO LV MASS INDEX 2D: 65.6 G/M2 (ref 49–115)
ECHO LV POSTERIOR WALL DIASTOLIC: 1 CM (ref 0.6–1)
ECHO LV RELATIVE WALL THICKNESS RATIO: 0.42
ECHO RV INTERNAL DIMENSION: 4.6 CM
EKG Q-T INTERVAL: 290 MS
EKG Q-T INTERVAL: 326 MS
EKG QRS DURATION: 102 MS
EKG QRS DURATION: 96 MS
EKG QTC CALCULATION (BAZETT): 465 MS
EKG QTC CALCULATION (BAZETT): 497 MS
EKG R AXIS: -63 DEGREES
EKG R AXIS: -63 DEGREES
EKG T AXIS: 39 DEGREES
EKG T AXIS: 73 DEGREES
EKG VENTRICULAR RATE: 140 BPM
EKG VENTRICULAR RATE: 155 BPM
GFR SERPL CREATININE-BSD FRML MDRD: 60 ML/MIN/1.73M2
GLUCOSE SERPL-MCNC: 117 MG/DL (ref 70–108)
LACTATE SERPL-SCNC: 2.3 MMOL/L (ref 0.5–2)
LACTATE SERPL-SCNC: 2.9 MMOL/L (ref 0.5–2)
LACTATE SERPL-SCNC: 3 MMOL/L (ref 0.5–2)
LACTATE SERPL-SCNC: 3.1 MMOL/L (ref 0.5–2)
MRSA DNA SPEC QL NAA+PROBE: NEGATIVE
POTASSIUM SERPL-SCNC: 4.8 MEQ/L (ref 3.5–5.2)
PROT SERPL-MCNC: 6.5 G/DL (ref 6.1–8)
PROT SERPL-MCNC: 6.5 G/DL (ref 6.1–8)
SODIUM SERPL-SCNC: 141 MEQ/L (ref 135–145)
T4 FREE SERPL-MCNC: 0.6 NG/DL (ref 0.93–1.68)
TROPONIN, HIGH SENSITIVITY: 48 NG/L (ref 0–12)

## 2024-12-10 PROCEDURE — 6370000000 HC RX 637 (ALT 250 FOR IP): Performed by: STUDENT IN AN ORGANIZED HEALTH CARE EDUCATION/TRAINING PROGRAM

## 2024-12-10 PROCEDURE — 92611 MOTION FLUOROSCOPY/SWALLOW: CPT

## 2024-12-10 PROCEDURE — 93307 TTE W/O DOPPLER COMPLETE: CPT

## 2024-12-10 PROCEDURE — 94799 UNLISTED PULMONARY SVC/PX: CPT

## 2024-12-10 PROCEDURE — 99223 1ST HOSP IP/OBS HIGH 75: CPT | Performed by: INTERNAL MEDICINE

## 2024-12-10 PROCEDURE — 6370000000 HC RX 637 (ALT 250 FOR IP)

## 2024-12-10 PROCEDURE — 93005 ELECTROCARDIOGRAM TRACING: CPT | Performed by: NURSE PRACTITIONER

## 2024-12-10 PROCEDURE — 6370000000 HC RX 637 (ALT 250 FOR IP): Performed by: NURSE PRACTITIONER

## 2024-12-10 PROCEDURE — 93010 ELECTROCARDIOGRAM REPORT: CPT | Performed by: NUCLEAR MEDICINE

## 2024-12-10 PROCEDURE — 2060000000 HC ICU INTERMEDIATE R&B

## 2024-12-10 PROCEDURE — 84439 ASSAY OF FREE THYROXINE: CPT

## 2024-12-10 PROCEDURE — 2500000003 HC RX 250 WO HCPCS

## 2024-12-10 PROCEDURE — 92610 EVALUATE SWALLOWING FUNCTION: CPT

## 2024-12-10 PROCEDURE — 6360000002 HC RX W HCPCS

## 2024-12-10 PROCEDURE — 80048 BASIC METABOLIC PNL TOTAL CA: CPT

## 2024-12-10 PROCEDURE — 80076 HEPATIC FUNCTION PANEL: CPT

## 2024-12-10 PROCEDURE — 2500000003 HC RX 250 WO HCPCS: Performed by: SURGERY

## 2024-12-10 PROCEDURE — 80162 ASSAY OF DIGOXIN TOTAL: CPT

## 2024-12-10 PROCEDURE — 71045 X-RAY EXAM CHEST 1 VIEW: CPT

## 2024-12-10 PROCEDURE — 87641 MR-STAPH DNA AMP PROBE: CPT

## 2024-12-10 PROCEDURE — 74230 X-RAY XM SWLNG FUNCJ C+: CPT

## 2024-12-10 PROCEDURE — 99232 SBSQ HOSP IP/OBS MODERATE 35: CPT | Performed by: SURGERY

## 2024-12-10 PROCEDURE — 93307 TTE W/O DOPPLER COMPLETE: CPT | Performed by: INTERNAL MEDICINE

## 2024-12-10 PROCEDURE — 84484 ASSAY OF TROPONIN QUANT: CPT

## 2024-12-10 PROCEDURE — 83605 ASSAY OF LACTIC ACID: CPT

## 2024-12-10 PROCEDURE — 99223 1ST HOSP IP/OBS HIGH 75: CPT

## 2024-12-10 PROCEDURE — 94010 BREATHING CAPACITY TEST: CPT

## 2024-12-10 PROCEDURE — 36415 COLL VENOUS BLD VENIPUNCTURE: CPT

## 2024-12-10 PROCEDURE — 92523 SPEECH SOUND LANG COMPREHEN: CPT

## 2024-12-10 RX ORDER — METOPROLOL SUCCINATE 100 MG/1
100 TABLET, EXTENDED RELEASE ORAL DAILY
Status: DISCONTINUED | OUTPATIENT
Start: 2024-12-10 | End: 2024-12-10

## 2024-12-10 RX ORDER — POTASSIUM CHLORIDE 29.8 MG/ML
20 INJECTION INTRAVENOUS PRN
Status: DISCONTINUED | OUTPATIENT
Start: 2024-12-10 | End: 2024-12-31 | Stop reason: HOSPADM

## 2024-12-10 RX ORDER — METOPROLOL TARTRATE 100 MG/1
100 TABLET ORAL 2 TIMES DAILY
Status: DISCONTINUED | OUTPATIENT
Start: 2024-12-10 | End: 2024-12-13

## 2024-12-10 RX ORDER — METHOCARBAMOL 500 MG/1
1000 TABLET, FILM COATED ORAL 4 TIMES DAILY
Status: DISCONTINUED | OUTPATIENT
Start: 2024-12-10 | End: 2024-12-31 | Stop reason: HOSPADM

## 2024-12-10 RX ORDER — ONDANSETRON 2 MG/ML
4 INJECTION INTRAMUSCULAR; INTRAVENOUS EVERY 6 HOURS PRN
Status: DISCONTINUED | OUTPATIENT
Start: 2024-12-10 | End: 2024-12-31 | Stop reason: HOSPADM

## 2024-12-10 RX ORDER — SPIRONOLACTONE 25 MG/1
25 TABLET ORAL DAILY
Status: DISCONTINUED | OUTPATIENT
Start: 2024-12-10 | End: 2024-12-17

## 2024-12-10 RX ORDER — LEVOTHYROXINE SODIUM 50 UG/1
50 TABLET ORAL DAILY
Status: DISCONTINUED | OUTPATIENT
Start: 2024-12-10 | End: 2024-12-31 | Stop reason: HOSPADM

## 2024-12-10 RX ORDER — MORPHINE SULFATE 2 MG/ML
2 INJECTION, SOLUTION INTRAMUSCULAR; INTRAVENOUS
Status: DISCONTINUED | OUTPATIENT
Start: 2024-12-10 | End: 2024-12-18

## 2024-12-10 RX ORDER — SODIUM CHLORIDE 0.9 % (FLUSH) 0.9 %
5-40 SYRINGE (ML) INJECTION PRN
Status: DISCONTINUED | OUTPATIENT
Start: 2024-12-10 | End: 2024-12-31 | Stop reason: HOSPADM

## 2024-12-10 RX ORDER — MAGNESIUM SULFATE IN WATER 40 MG/ML
2000 INJECTION, SOLUTION INTRAVENOUS PRN
Status: DISCONTINUED | OUTPATIENT
Start: 2024-12-10 | End: 2024-12-25

## 2024-12-10 RX ORDER — HYDROCODONE BITARTRATE AND ACETAMINOPHEN 5; 325 MG/1; MG/1
2 TABLET ORAL EVERY 4 HOURS PRN
Status: DISCONTINUED | OUTPATIENT
Start: 2024-12-10 | End: 2024-12-18

## 2024-12-10 RX ORDER — MORPHINE SULFATE 4 MG/ML
4 INJECTION, SOLUTION INTRAMUSCULAR; INTRAVENOUS
Status: DISCONTINUED | OUTPATIENT
Start: 2024-12-10 | End: 2024-12-18

## 2024-12-10 RX ORDER — SODIUM CHLORIDE 0.9 % (FLUSH) 0.9 %
5-40 SYRINGE (ML) INJECTION EVERY 12 HOURS SCHEDULED
Status: DISCONTINUED | OUTPATIENT
Start: 2024-12-10 | End: 2024-12-31 | Stop reason: HOSPADM

## 2024-12-10 RX ORDER — ACETAMINOPHEN 325 MG/1
650 TABLET ORAL EVERY 4 HOURS PRN
Status: DISCONTINUED | OUTPATIENT
Start: 2024-12-10 | End: 2024-12-31 | Stop reason: HOSPADM

## 2024-12-10 RX ORDER — SODIUM CHLORIDE 9 MG/ML
INJECTION, SOLUTION INTRAVENOUS PRN
Status: DISCONTINUED | OUTPATIENT
Start: 2024-12-10 | End: 2024-12-31 | Stop reason: HOSPADM

## 2024-12-10 RX ORDER — FAMOTIDINE 20 MG/1
20 TABLET, FILM COATED ORAL 2 TIMES DAILY
Status: DISCONTINUED | OUTPATIENT
Start: 2024-12-10 | End: 2024-12-11

## 2024-12-10 RX ORDER — LEVOTHYROXINE SODIUM 25 UG/1
25 TABLET ORAL DAILY
Status: ON HOLD | COMMUNITY
Start: 2024-11-27 | End: 2024-12-30 | Stop reason: HOSPADM

## 2024-12-10 RX ORDER — BUMETANIDE 0.25 MG/ML
1 INJECTION, SOLUTION INTRAMUSCULAR; INTRAVENOUS 2 TIMES DAILY
Status: DISCONTINUED | OUTPATIENT
Start: 2024-12-10 | End: 2024-12-10

## 2024-12-10 RX ORDER — POTASSIUM CHLORIDE 7.45 MG/ML
10 INJECTION INTRAVENOUS PRN
Status: DISCONTINUED | OUTPATIENT
Start: 2024-12-10 | End: 2024-12-31 | Stop reason: HOSPADM

## 2024-12-10 RX ORDER — ONDANSETRON 4 MG/1
4 TABLET, ORALLY DISINTEGRATING ORAL EVERY 8 HOURS PRN
Status: DISCONTINUED | OUTPATIENT
Start: 2024-12-10 | End: 2024-12-31 | Stop reason: HOSPADM

## 2024-12-10 RX ORDER — BUMETANIDE 0.25 MG/ML
1 INJECTION, SOLUTION INTRAMUSCULAR; INTRAVENOUS DAILY
Status: DISCONTINUED | OUTPATIENT
Start: 2024-12-11 | End: 2024-12-18

## 2024-12-10 RX ORDER — LIDOCAINE 4 G/G
2 PATCH TOPICAL DAILY
Status: DISCONTINUED | OUTPATIENT
Start: 2024-12-10 | End: 2024-12-31 | Stop reason: HOSPADM

## 2024-12-10 RX ORDER — HYDROCODONE BITARTRATE AND ACETAMINOPHEN 5; 325 MG/1; MG/1
1 TABLET ORAL EVERY 4 HOURS PRN
Status: DISCONTINUED | OUTPATIENT
Start: 2024-12-10 | End: 2024-12-18

## 2024-12-10 RX ORDER — METOPROLOL TARTRATE 1 MG/ML
5 INJECTION, SOLUTION INTRAVENOUS ONCE
Status: COMPLETED | OUTPATIENT
Start: 2024-12-10 | End: 2024-12-10

## 2024-12-10 RX ORDER — VALSARTAN 40 MG/1
40 TABLET ORAL DAILY
Status: DISCONTINUED | OUTPATIENT
Start: 2024-12-10 | End: 2024-12-18

## 2024-12-10 RX ORDER — POLYETHYLENE GLYCOL 3350 17 G/17G
17 POWDER, FOR SOLUTION ORAL DAILY
Status: DISCONTINUED | OUTPATIENT
Start: 2024-12-10 | End: 2024-12-31 | Stop reason: HOSPADM

## 2024-12-10 RX ADMIN — METOPROLOL 100 MG: 100 TABLET ORAL at 23:18

## 2024-12-10 RX ADMIN — AMIODARONE HYDROCHLORIDE 150 MG: 1.5 INJECTION, SOLUTION INTRAVENOUS at 01:00

## 2024-12-10 RX ADMIN — METHOCARBAMOL 1000 MG: 500 TABLET ORAL at 13:46

## 2024-12-10 RX ADMIN — METOPROLOL SUCCINATE 100 MG: 100 TABLET, EXTENDED RELEASE ORAL at 08:15

## 2024-12-10 RX ADMIN — METHOCARBAMOL 1000 MG: 500 TABLET ORAL at 20:03

## 2024-12-10 RX ADMIN — AMIODARONE HYDROCHLORIDE 1 MG/MIN: 1.8 INJECTION, SOLUTION INTRAVENOUS at 01:15

## 2024-12-10 RX ADMIN — AMIODARONE HYDROCHLORIDE 0.5 MG/MIN: 1.8 INJECTION, SOLUTION INTRAVENOUS at 20:00

## 2024-12-10 RX ADMIN — METOPROLOL TARTRATE 5 MG: 5 INJECTION INTRAVENOUS at 03:18

## 2024-12-10 RX ADMIN — FAMOTIDINE 20 MG: 20 TABLET, FILM COATED ORAL at 08:15

## 2024-12-10 RX ADMIN — LEVOTHYROXINE SODIUM 50 MCG: 0.05 TABLET ORAL at 08:15

## 2024-12-10 RX ADMIN — AMIODARONE HYDROCHLORIDE 0.5 MG/MIN: 1.8 INJECTION, SOLUTION INTRAVENOUS at 06:53

## 2024-12-10 RX ADMIN — BARIUM SULFATE 20 ML: 0.81 POWDER, FOR SUSPENSION ORAL at 10:16

## 2024-12-10 RX ADMIN — BARIUM SULFATE 10 ML: 400 PASTE ORAL at 10:16

## 2024-12-10 RX ADMIN — BUMETANIDE 1 MG: 0.25 INJECTION INTRAMUSCULAR; INTRAVENOUS at 06:47

## 2024-12-10 RX ADMIN — METHOCARBAMOL 1000 MG: 500 TABLET ORAL at 08:14

## 2024-12-10 RX ADMIN — VALSARTAN 40 MG: 40 TABLET ORAL at 08:15

## 2024-12-10 ASSESSMENT — PAIN DESCRIPTION - LOCATION: LOCATION: BACK

## 2024-12-10 ASSESSMENT — PAIN SCALES - GENERAL
PAINLEVEL_OUTOF10: 0

## 2024-12-10 NOTE — CONSULTS
[facility]  Consult    Patient's Name/Date of Birth: Tato Cannon / 1940 (84 y.o.)    Date: December 10, 2024     Referring Provider: Ashley Escobar MD    CHIEF COMPLAINT: Fall      HPI: This is a pleasant 84 y.o. male with PMHx of HTN, HLD, hard of hearing, HFrEF, chronic A-fib s/p DCCV, hypothyroidism presents after a fall, but given patient's past medical history cardiologist consulted.  Patient is noncompliant with his medication as prescribed, he takes Eliquis once a day rather than twice, and takes Bumex twice a day rather than once.  Patient denies any chest pain, SOB, palpitations, dizziness, LOC, weakness, or sensory changes.          EKG atrial fibrillation with RVR, incomplete RBBB, seen in previous EKGs of 2/20/2015  Echo: 10/10/2024 decreased systolic function with EF 35 to 40%       All labs, EKG's, diagnostic testing and images as well as cardiac cath, stress testing were reviewed during this encounter    Past Medical History:   Diagnosis Date    A-fib (HCC)     CHF (congestive heart failure) (HCC)     HLD (hyperlipidemia)     [place] (hard of hearing)     Hearing aids    Hyperlipidemia     Hypertension      History reviewed. No pertinent surgical history.  Current Facility-Administered Medications   Medication Dose Route Frequency Provider Last Rate Last Admin    sodium chloride flush 0.9 % injection 5-40 mL  5-40 mL IntraVENous 2 times per day Tiffanie Casillas APRN - CNP        sodium chloride flush 0.9 % injection 5-40 mL  5-40 mL IntraVENous PRN Tiffanie Casillas APRN - CNP        0.9 % sodium chloride infusion   IntraVENous PRN Tiffanie Casillas APRN - CNP        potassium chloride 20 mEq/50 mL IVPB (Central Line)  20 mEq IntraVENous PRN Tiffanie Casillas APRN - CNP        Or    potassium chloride 10 mEq/100 mL IVPB (Peripheral Line)  10 mEq IntraVENous PRN Tiffanie aCsillas APRN - CNP        magnesium sulfate 2000 mg in 50 mL IVPB premix  2,000 mg IntraVENous

## 2024-12-10 NOTE — ED NOTES
ED to inpatient nurses report      Chief Complaint:  Chief Complaint   Patient presents with    Fall     Present to ED from: home    MOA:     LOC: alert and orientated to name, place, date  Mobility: Requires assistance * 2  Oxygen Baseline: RA    Current needs required: RA     Code Status:   Prior    What abnormal results were found and what did you give/do to treat them? See chart and labs   Any procedures or intervention occur? none    Mental Status:  Level of Consciousness: Alert (0)    Psych Assessment:        Vitals:  Patient Vitals for the past 24 hrs:   BP Temp Temp src Pulse Resp SpO2 Height Weight   12/09/24 2335 99/83 -- -- (!) 148 18 97 % -- --   12/09/24 2219 (!) 107/94 -- -- (!) 137 16 95 % -- --   12/09/24 2112 106/86 -- -- (!) 133 16 96 % -- --   12/09/24 2110 106/86 -- -- (!) 144 -- -- -- --   12/09/24 2023 111/89 -- -- (!) 139 14 95 % -- --   12/09/24 1948 (!) 115/95 97.8 °F (36.6 °C) Oral (!) 154 16 98 % 1.753 m (5' 9\") 79.4 kg (175 lb)        LDAs:   Peripheral IV 12/09/24 Right Antecubital (Active)       Peripheral IV 12/09/24 Distal;Right Forearm (Active)       Ambulatory Status:  Presents to emergency department  because of falls (Syncope, seizure, or loss of consciousness): Yes, Age > 70: Yes, Altered Mental Status, Intoxication with alcohol or substance confusion (Disorientation, impaired judgment, poor safety awaremess, or inability to follow instructions): No, Impaired Mobility: Ambulates or transfers with assistive devices or assistance; Unable to ambulate or transer.: No, Nursing Judgement: Yes    Diagnosis:  DISPOSITION Admitted 12/09/2024 11:40:00 PM   Final diagnoses:   Traumatic pneumothorax, initial encounter   Bilateral pleural effusion   Closed fracture of multiple ribs of right side, initial encounter   Atrial fibrillation, unspecified type (HCC)   Fall, initial encounter        Consults:  IP CONSULT TO TRAUMA SURGERY     Pain Score:  Pain Assessment  Pain Assessment: 0-10  Pain

## 2024-12-10 NOTE — ED NOTES
Pt medicated via MAR. Covid/flu collected. Pt resting in bed with family at the bedside. RR easy and unlabored. Call light in reach.

## 2024-12-10 NOTE — PROCEDURES
PROCEDURE NOTE  Date: 12/10/2024   Name: Tato Cannon  YOB: 1940    Procedures      EKG was completed and handed to RN

## 2024-12-10 NOTE — CONSULTS
Hospitalist Initial Consult Note      Patient:  Tato Cannon    Unit/Bed:4K-08/008-A  YOB: 1940  MRN: [id number]   Acct: [id number]     PCP: Mao Spencer MD  Date of Admission: 12/9/2024     Chief Complaint:  fall  Reason for consult  CHF exacerbation and afib with rvr    Date of Service: Pt seen/examined in consultation on 12/10/2024     History Of Present Illness:      Tato Cannon84 y.o. male who we are asked to see/evaluate by Ashley Escobar MD for medical management of CHF exacerbation and Afib with RVR.  Patient very hard of hearing.  Patient reports he had a fall last night.  Per ER documentation family states the patient fell down 3 porch stairs.  Patient does tell me he has had increased swelling in his legs that has caused difficulty with ambulation.  He follows with Dr. Romero in the heart failure clinic.  States that he was admitted in the summer for heart failure exacerbation and had been doing well up until the end of October.  States since October he has had increased lower extremity edema.  Has had weeping edema.  In the ER was found to have rib fractures and admitted to trauma service.  Hospitalist consulted for medical management of chronic conditions as well as CHF exacerbation and A-fib with RVR.  Upon exam, patient resting in bed.  Denies any cardiac chest pain or shortness of breath.  Does endorse right lateral chest wall/rib pain.  States this is where he fell.      Assessment and Plan:-  Trauma by Fall: Multiple right-sided rib fractures with right pulmonary contusion, right apical pneumothorax.  Managed per primary.  Atrial Fibrillation with RVR: EKG showing A-fib with RVR, rate in the 150s.  Appears at home patient is on Toprol-XL and Eliquis. Per prior documentation had previously been on amiodarone and was stopped due to elevated LFTs, also previously on digoxin. Patient was given bolus of diltiazem in ER.  No significant improvement in

## 2024-12-10 NOTE — ED TRIAGE NOTES
Pt present to the ED via EMS from home with c/c of fall. EMS reports pt fell last night at 2200 down the last three steps of his stairs. PT states he did not lose consciousness but did hit his head. Pt states when he fell he also hit right flank. Pt has a small bruise on his right flank. EKG completed. IV access established. Labs drawn.

## 2024-12-10 NOTE — H&P
I have independently performed an evaluation on Tato . I have reviewed the   documentation completed by the Tiffanie Casillas APRN - RICK     I personally saw and examined the patient     Total time spent 25minutes.  Time could have been discontiguous.  Time does not include procedures.  Time does include my direct assessment of the patient and coordination of care.      Patient fell last evening down the stairs.  Tried to sleep off the pain however came in due to back pain.  Patient found to have apical pneumothorax rib.  Rib fractures with associated pulmonary contusion L4 compression fraction possibleold and bilateral third pleural effusions.  He also is an acute exacerbation of his CHF with atrial for but RVR.  He has an elevated troponin.  Medicine has been consulted.  Spine to consulted pending mobilization will repeat chest x-ray in the morning if worse may need a small chest tube.  Continue patient's pain control.    Electronically signed by Ashley Escobar MD on 12/10/2024 at 4:22 AM    Trauma H&P  Dr. Ashley Escobar     Patient:  Tato Cannon  Admit date: 12/9/2024   YOB: 1940 Date of Evaluation: 12/9/2024  MRN: [id number]  Acct: [id number]    Injury Date:12/08/2024  Injury time:Evening  PCP: Mao Spencer MD   Referring physician: Dr. Trent    Time of Trauma Surgeon Notification:  2247  Time of Trauma NAEL Arrival: 2300  Time of Trauma Surgeon Arrival:  425  Services Requested Within 30 Minutes: None   Time Contacted:N/A    Assessment:    Trauma by fall  Multiple right sided rib fractures with right pulmonary contusion   Right apical pneumothorax  L4 compression fracture  Bilateral pleural effusions  Acute exacerbation of CHF  Atrial fibrillation with RVR  Elevated troponin   HTN, HLD, Afib, CHF  Plan:    Admit to 4K under Trauma Services    Trauma by fall   - Fall precautions   - PT, OT eval and treat    Multiple right sided rib fractures with right pulmonary contusion   - CT of

## 2024-12-11 ENCOUNTER — APPOINTMENT (OUTPATIENT)
Dept: ULTRASOUND IMAGING | Age: 84
DRG: 199 | End: 2024-12-11
Payer: MEDICARE

## 2024-12-11 ENCOUNTER — APPOINTMENT (OUTPATIENT)
Dept: GENERAL RADIOLOGY | Age: 84
DRG: 199 | End: 2024-12-11
Payer: MEDICARE

## 2024-12-11 PROBLEM — I50.23 ACUTE ON CHRONIC SYSTOLIC CONGESTIVE HEART FAILURE (HCC): Status: ACTIVE | Noted: 2023-05-16

## 2024-12-11 PROBLEM — R00.1 SINUS BRADYCARDIA: Status: ACTIVE | Noted: 2024-12-11

## 2024-12-11 PROBLEM — S27.0XXA PNEUMOTHORAX, TRAUMATIC: Status: ACTIVE | Noted: 2024-12-11

## 2024-12-11 PROBLEM — I42.8 NICM (NONISCHEMIC CARDIOMYOPATHY) (HCC): Status: ACTIVE | Noted: 2024-12-11

## 2024-12-11 LAB
ALBUMIN SERPL BCG-MCNC: 3 G/DL (ref 3.5–5.1)
ALP SERPL-CCNC: 228 U/L (ref 38–126)
ALT SERPL W/O P-5'-P-CCNC: 23 U/L (ref 11–66)
ANION GAP SERPL CALC-SCNC: 15 MEQ/L (ref 8–16)
ANION GAP SERPL CALC-SCNC: 18 MEQ/L (ref 8–16)
ARTERIAL PATENCY WRIST A: POSITIVE
AST SERPL-CCNC: 24 U/L (ref 5–40)
BASE EXCESS BLDA CALC-SCNC: -5.4 MMOL/L (ref -2.5–2.5)
BASOPHILS ABSOLUTE: 0 THOU/MM3 (ref 0–0.1)
BASOPHILS ABSOLUTE: 0 THOU/MM3 (ref 0–0.1)
BASOPHILS NFR BLD AUTO: 0.3 %
BASOPHILS NFR BLD AUTO: 0.5 %
BDY SITE: ABNORMAL
BILIRUB CONJ SERPL-MCNC: 0.7 MG/DL (ref 0.1–13.8)
BILIRUB SERPL-MCNC: 1.1 MG/DL (ref 0.3–1.2)
BUN SERPL-MCNC: 43 MG/DL (ref 7–22)
BUN SERPL-MCNC: 51 MG/DL (ref 7–22)
CALCIUM SERPL-MCNC: 8.6 MG/DL (ref 8.5–10.5)
CALCIUM SERPL-MCNC: 8.7 MG/DL (ref 8.5–10.5)
CHLORIDE SERPL-SCNC: 104 MEQ/L (ref 98–111)
CHLORIDE SERPL-SCNC: 105 MEQ/L (ref 98–111)
CO2 SERPL-SCNC: 16 MEQ/L (ref 23–33)
CO2 SERPL-SCNC: 18 MEQ/L (ref 23–33)
COLLECTED BY:: ABNORMAL
CREAT SERPL-MCNC: 1.8 MG/DL (ref 0.4–1.2)
CREAT SERPL-MCNC: 1.8 MG/DL (ref 0.4–1.2)
DEPRECATED RDW RBC AUTO: 57.1 FL (ref 35–45)
DEPRECATED RDW RBC AUTO: 57.6 FL (ref 35–45)
DEVICE: ABNORMAL
EKG ATRIAL RATE: 44 BPM
EKG ATRIAL RATE: 55 BPM
EKG P AXIS: 41 DEGREES
EKG P AXIS: 61 DEGREES
EKG P-R INTERVAL: 164 MS
EKG P-R INTERVAL: 184 MS
EKG Q-T INTERVAL: 426 MS
EKG Q-T INTERVAL: 508 MS
EKG QRS DURATION: 86 MS
EKG QRS DURATION: 98 MS
EKG QTC CALCULATION (BAZETT): 407 MS
EKG QTC CALCULATION (BAZETT): 434 MS
EKG R AXIS: -48 DEGREES
EKG R AXIS: -61 DEGREES
EKG T AXIS: -160 DEGREES
EKG T AXIS: 57 DEGREES
EKG VENTRICULAR RATE: 44 BPM
EKG VENTRICULAR RATE: 55 BPM
EOSINOPHIL NFR BLD AUTO: 0.3 %
EOSINOPHIL NFR BLD AUTO: 1.5 %
EOSINOPHILS ABSOLUTE: 0 THOU/MM3 (ref 0–0.4)
EOSINOPHILS ABSOLUTE: 0.1 THOU/MM3 (ref 0–0.4)
ERYTHROCYTE [DISTWIDTH] IN BLOOD BY AUTOMATED COUNT: 16.8 % (ref 11.5–14.5)
ERYTHROCYTE [DISTWIDTH] IN BLOOD BY AUTOMATED COUNT: 16.8 % (ref 11.5–14.5)
GFR SERPL CREATININE-BSD FRML MDRD: 37 ML/MIN/1.73M2
GFR SERPL CREATININE-BSD FRML MDRD: 37 ML/MIN/1.73M2
GLUCOSE BLD STRIP.AUTO-MCNC: 162 MG/DL (ref 70–108)
GLUCOSE BLD STRIP.AUTO-MCNC: 183 MG/DL (ref 70–108)
GLUCOSE SERPL-MCNC: 101 MG/DL (ref 70–108)
GLUCOSE SERPL-MCNC: 143 MG/DL (ref 70–108)
HCO3 BLDA-SCNC: 17 MMOL/L (ref 23–28)
HCT VFR BLD AUTO: 36.7 % (ref 42–52)
HCT VFR BLD AUTO: 37.2 % (ref 42–52)
HGB BLD-MCNC: 10.7 GM/DL (ref 14–18)
HGB BLD-MCNC: 11.1 GM/DL (ref 14–18)
IMM GRANULOCYTES # BLD AUTO: 0.03 THOU/MM3 (ref 0–0.07)
IMM GRANULOCYTES # BLD AUTO: 0.05 THOU/MM3 (ref 0–0.07)
IMM GRANULOCYTES NFR BLD AUTO: 0.5 %
IMM GRANULOCYTES NFR BLD AUTO: 0.6 %
LACTATE SERPL-SCNC: 2.6 MMOL/L (ref 0.5–2)
LACTATE SERPL-SCNC: 2.7 MMOL/L (ref 0.5–2)
LACTATE SERPL-SCNC: 3 MMOL/L (ref 0.5–2)
LACTATE SERPL-SCNC: 4.2 MMOL/L (ref 0.5–2)
LACTATE SERPL-SCNC: 4.8 MMOL/L (ref 0.5–2)
LACTIC ACID, SEPSIS: 3.8 MMOL/L (ref 0.5–1.9)
LDH FLD L TO P-CCNC: 830 U/L
LYMPHOCYTES ABSOLUTE: 0.8 THOU/MM3 (ref 1–4.8)
LYMPHOCYTES ABSOLUTE: 0.9 THOU/MM3 (ref 1–4.8)
LYMPHOCYTES NFR BLD AUTO: 10.7 %
LYMPHOCYTES NFR BLD AUTO: 13.2 %
MCH RBC QN AUTO: 28.2 PG (ref 26–33)
MCH RBC QN AUTO: 28.5 PG (ref 26–33)
MCHC RBC AUTO-ENTMCNC: 29.2 GM/DL (ref 32.2–35.5)
MCHC RBC AUTO-ENTMCNC: 29.8 GM/DL (ref 32.2–35.5)
MCV RBC AUTO: 95.4 FL (ref 80–94)
MCV RBC AUTO: 96.8 FL (ref 80–94)
MONOCYTES ABSOLUTE: 0.4 THOU/MM3 (ref 0.4–1.3)
MONOCYTES ABSOLUTE: 1.3 THOU/MM3 (ref 0.4–1.3)
MONOCYTES NFR BLD AUTO: 15.2 %
MONOCYTES NFR BLD AUTO: 7.1 %
NEUTROPHILS ABSOLUTE: 4.8 THOU/MM3 (ref 1.8–7.7)
NEUTROPHILS ABSOLUTE: 6.2 THOU/MM3 (ref 1.8–7.7)
NEUTROPHILS NFR BLD AUTO: 71.5 %
NEUTROPHILS NFR BLD AUTO: 78.6 %
NRBC BLD AUTO-RTO: 0 /100 WBC
NRBC BLD AUTO-RTO: 0 /100 WBC
NT-PROBNP SERPL IA-MCNC: 5433 PG/ML (ref 0–449)
PCO2 BLDA: 24 MMHG (ref 35–45)
PH BIFL: 7.37 [PH]
PH BLDA: 7.45 [PH] (ref 7.35–7.45)
PLATELET # BLD AUTO: 162 THOU/MM3 (ref 130–400)
PLATELET # BLD AUTO: 172 THOU/MM3 (ref 130–400)
PMV BLD AUTO: 11 FL (ref 9.4–12.4)
PMV BLD AUTO: 11.2 FL (ref 9.4–12.4)
PO2 BLDA: 81 MMHG (ref 71–104)
POTASSIUM SERPL-SCNC: 5.2 MEQ/L (ref 3.5–5.2)
POTASSIUM SERPL-SCNC: 5.2 MEQ/L (ref 3.5–5.2)
POTASSIUM SERPL-SCNC: 5.3 MEQ/L (ref 3.5–5.2)
POTASSIUM SERPL-SCNC: 6 MEQ/L (ref 3.5–5.2)
PROCALCITONIN SERPL IA-MCNC: 0.44 NG/ML (ref 0.01–0.09)
PROT SERPL-MCNC: 6.7 G/DL (ref 6.1–8)
RBC # BLD AUTO: 3.79 MILL/MM3 (ref 4.7–6.1)
RBC # BLD AUTO: 3.9 MILL/MM3 (ref 4.7–6.1)
REASON FOR REJECTION: NORMAL
REJECTED TEST: NORMAL
SAO2 % BLDA: 97 %
SODIUM SERPL-SCNC: 138 MEQ/L (ref 135–145)
SODIUM SERPL-SCNC: 138 MEQ/L (ref 135–145)
T4 FREE SERPL-MCNC: 0.76 NG/DL (ref 0.93–1.68)
TROPONIN, HIGH SENSITIVITY: 54 NG/L (ref 0–12)
TSH SERPL DL<=0.005 MIU/L-ACNC: 14.93 UIU/ML (ref 0.4–4.2)
VENTILATION MODE VENT: ABNORMAL
WBC # BLD AUTO: 6.1 THOU/MM3 (ref 4.8–10.8)
WBC # BLD AUTO: 8.7 THOU/MM3 (ref 4.8–10.8)

## 2024-12-11 PROCEDURE — 83986 ASSAY PH BODY FLUID NOS: CPT

## 2024-12-11 PROCEDURE — 87205 SMEAR GRAM STAIN: CPT

## 2024-12-11 PROCEDURE — 89051 BODY FLUID CELL COUNT: CPT

## 2024-12-11 PROCEDURE — 2060000000 HC ICU INTERMEDIATE R&B

## 2024-12-11 PROCEDURE — P9041 ALBUMIN (HUMAN),5%, 50ML: HCPCS

## 2024-12-11 PROCEDURE — 36415 COLL VENOUS BLD VENIPUNCTURE: CPT

## 2024-12-11 PROCEDURE — 82803 BLOOD GASES ANY COMBINATION: CPT

## 2024-12-11 PROCEDURE — 83605 ASSAY OF LACTIC ACID: CPT

## 2024-12-11 PROCEDURE — 80053 COMPREHEN METABOLIC PANEL: CPT

## 2024-12-11 PROCEDURE — 94799 UNLISTED PULMONARY SVC/PX: CPT

## 2024-12-11 PROCEDURE — 6360000002 HC RX W HCPCS

## 2024-12-11 PROCEDURE — 97530 THERAPEUTIC ACTIVITIES: CPT

## 2024-12-11 PROCEDURE — 36600 WITHDRAWAL OF ARTERIAL BLOOD: CPT

## 2024-12-11 PROCEDURE — 71045 X-RAY EXAM CHEST 1 VIEW: CPT

## 2024-12-11 PROCEDURE — 97166 OT EVAL MOD COMPLEX 45 MIN: CPT

## 2024-12-11 PROCEDURE — 51798 US URINE CAPACITY MEASURE: CPT

## 2024-12-11 PROCEDURE — 84132 ASSAY OF SERUM POTASSIUM: CPT

## 2024-12-11 PROCEDURE — 2580000003 HC RX 258: Performed by: STUDENT IN AN ORGANIZED HEALTH CARE EDUCATION/TRAINING PROGRAM

## 2024-12-11 PROCEDURE — 82948 REAGENT STRIP/BLOOD GLUCOSE: CPT

## 2024-12-11 PROCEDURE — 87075 CULTR BACTERIA EXCEPT BLOOD: CPT

## 2024-12-11 PROCEDURE — 83615 LACTATE (LD) (LDH) ENZYME: CPT

## 2024-12-11 PROCEDURE — 84145 PROCALCITONIN (PCT): CPT

## 2024-12-11 PROCEDURE — 97162 PT EVAL MOD COMPLEX 30 MIN: CPT

## 2024-12-11 PROCEDURE — 6370000000 HC RX 637 (ALT 250 FOR IP)

## 2024-12-11 PROCEDURE — 93010 ELECTROCARDIOGRAM REPORT: CPT | Performed by: NUCLEAR MEDICINE

## 2024-12-11 PROCEDURE — 84443 ASSAY THYROID STIM HORMONE: CPT

## 2024-12-11 PROCEDURE — 6370000000 HC RX 637 (ALT 250 FOR IP): Performed by: STUDENT IN AN ORGANIZED HEALTH CARE EDUCATION/TRAINING PROGRAM

## 2024-12-11 PROCEDURE — 87070 CULTURE OTHR SPECIMN AEROBIC: CPT

## 2024-12-11 PROCEDURE — 93005 ELECTROCARDIOGRAM TRACING: CPT

## 2024-12-11 PROCEDURE — 99233 SBSQ HOSP IP/OBS HIGH 50: CPT | Performed by: STUDENT IN AN ORGANIZED HEALTH CARE EDUCATION/TRAINING PROGRAM

## 2024-12-11 PROCEDURE — 84439 ASSAY OF FREE THYROXINE: CPT

## 2024-12-11 PROCEDURE — 83880 ASSAY OF NATRIURETIC PEPTIDE: CPT

## 2024-12-11 PROCEDURE — 85025 COMPLETE CBC W/AUTO DIFF WBC: CPT

## 2024-12-11 PROCEDURE — 99232 SBSQ HOSP IP/OBS MODERATE 35: CPT | Performed by: NURSE PRACTITIONER

## 2024-12-11 PROCEDURE — 84484 ASSAY OF TROPONIN QUANT: CPT

## 2024-12-11 PROCEDURE — 82248 BILIRUBIN DIRECT: CPT

## 2024-12-11 PROCEDURE — 6370000000 HC RX 637 (ALT 250 FOR IP): Performed by: NURSE PRACTITIONER

## 2024-12-11 PROCEDURE — 97112 NEUROMUSCULAR REEDUCATION: CPT

## 2024-12-11 PROCEDURE — 32555 ASPIRATE PLEURA W/ IMAGING: CPT

## 2024-12-11 PROCEDURE — 2580000003 HC RX 258: Performed by: NURSE PRACTITIONER

## 2024-12-11 PROCEDURE — APPSS30 APP SPLIT SHARED TIME 16-30 MINUTES: Performed by: NURSE PRACTITIONER

## 2024-12-11 PROCEDURE — 0W993ZZ DRAINAGE OF RIGHT PLEURAL CAVITY, PERCUTANEOUS APPROACH: ICD-10-PCS | Performed by: RADIOLOGY

## 2024-12-11 RX ORDER — IPRATROPIUM BROMIDE AND ALBUTEROL SULFATE 2.5; .5 MG/3ML; MG/3ML
1 SOLUTION RESPIRATORY (INHALATION) EVERY 4 HOURS PRN
Status: DISCONTINUED | OUTPATIENT
Start: 2024-12-11 | End: 2024-12-31 | Stop reason: HOSPADM

## 2024-12-11 RX ORDER — DEXTROSE MONOHYDRATE 100 MG/ML
INJECTION, SOLUTION INTRAVENOUS CONTINUOUS PRN
Status: DISCONTINUED | OUTPATIENT
Start: 2024-12-11 | End: 2024-12-31 | Stop reason: HOSPADM

## 2024-12-11 RX ORDER — ATROPINE SULFATE 0.1 MG/ML
0.5 INJECTION INTRAVENOUS
Status: ACTIVE | OUTPATIENT
Start: 2024-12-11 | End: 2024-12-12

## 2024-12-11 RX ORDER — 0.9 % SODIUM CHLORIDE 0.9 %
500 INTRAVENOUS SOLUTION INTRAVENOUS ONCE
Status: DISCONTINUED | OUTPATIENT
Start: 2024-12-11 | End: 2024-12-11

## 2024-12-11 RX ORDER — FAMOTIDINE 20 MG/1
20 TABLET, FILM COATED ORAL DAILY
Status: DISCONTINUED | OUTPATIENT
Start: 2024-12-12 | End: 2024-12-31 | Stop reason: HOSPADM

## 2024-12-11 RX ORDER — ALBUMIN HUMAN 50 G/1000ML
25 SOLUTION INTRAVENOUS ONCE
Status: COMPLETED | OUTPATIENT
Start: 2024-12-11 | End: 2024-12-11

## 2024-12-11 RX ORDER — GLUCAGON 1 MG/ML
1 KIT INJECTION PRN
Status: DISCONTINUED | OUTPATIENT
Start: 2024-12-11 | End: 2024-12-31 | Stop reason: HOSPADM

## 2024-12-11 RX ORDER — IPRATROPIUM BROMIDE AND ALBUTEROL SULFATE 2.5; .5 MG/3ML; MG/3ML
1 SOLUTION RESPIRATORY (INHALATION)
Status: DISCONTINUED | OUTPATIENT
Start: 2024-12-11 | End: 2024-12-11

## 2024-12-11 RX ADMIN — SODIUM CHLORIDE, PRESERVATIVE FREE 10 ML: 5 INJECTION INTRAVENOUS at 20:12

## 2024-12-11 RX ADMIN — INSULIN HUMAN 10 UNITS: 100 INJECTION, SOLUTION PARENTERAL at 18:05

## 2024-12-11 RX ADMIN — METHOCARBAMOL 1000 MG: 500 TABLET ORAL at 20:11

## 2024-12-11 RX ADMIN — DEXTROSE MONOHYDRATE 250 ML: 100 INJECTION, SOLUTION INTRAVENOUS at 18:06

## 2024-12-11 RX ADMIN — SODIUM CHLORIDE, PRESERVATIVE FREE 10 ML: 5 INJECTION INTRAVENOUS at 13:07

## 2024-12-11 RX ADMIN — METHOCARBAMOL 1000 MG: 500 TABLET ORAL at 13:06

## 2024-12-11 RX ADMIN — METHOCARBAMOL 1000 MG: 500 TABLET ORAL at 10:05

## 2024-12-11 RX ADMIN — LEVOTHYROXINE SODIUM 50 MCG: 0.05 TABLET ORAL at 06:11

## 2024-12-11 RX ADMIN — METHOCARBAMOL 1000 MG: 500 TABLET ORAL at 15:44

## 2024-12-11 RX ADMIN — APIXABAN 2.5 MG: 2.5 TABLET, FILM COATED ORAL at 12:55

## 2024-12-11 RX ADMIN — BUMETANIDE 1 MG: 0.25 INJECTION INTRAMUSCULAR; INTRAVENOUS at 13:06

## 2024-12-11 RX ADMIN — POLYETHYLENE GLYCOL 3350 17 G: 17 POWDER, FOR SOLUTION ORAL at 10:06

## 2024-12-11 RX ADMIN — FAMOTIDINE 20 MG: 20 TABLET, FILM COATED ORAL at 10:06

## 2024-12-11 RX ADMIN — ALBUMIN (HUMAN) 25 G: 12.5 INJECTION, SOLUTION INTRAVENOUS at 18:36

## 2024-12-11 ASSESSMENT — PAIN SCALES - GENERAL
PAINLEVEL_OUTOF10: 0
PAINLEVEL_OUTOF10: 3
PAINLEVEL_OUTOF10: 0

## 2024-12-11 ASSESSMENT — PAIN DESCRIPTION - LOCATION: LOCATION: BACK

## 2024-12-11 NOTE — PROCEDURES
PROCEDURE NOTE  Date: 12/11/2024   Name: Tato Cannon  YOB: 1940    Procedures    EKG completed, given to Joe HANNAH

## 2024-12-11 NOTE — RT PROTOCOL NOTE
RT Inhaler-Nebulizer Bronchodilator Protocol Note    There is a bronchodilator order in the chart from a provider indicating to follow the RT Bronchodilator Protocol and there is an “Initiate RT Inhaler-Nebulizer Bronchodilator Protocol” order as well (see protocol at bottom of note).    CXR Findings:  XR CHEST PORTABLE    Result Date: 12/10/2024  1. Small right pleural effusion with mild dependent right basilar atelectasis or pneumonia. Aeration of the right lung base is slightly worsened from prior examination. **This report has been created using voice recognition software.  It may contain minor errors which are inherent in voice recognition technology.** Electronically signed by Dr. John Shine      The findings from the last RT Protocol Assessment were as follows:   History Pulmonary Disease: None or smoker <15 pack years  Respiratory Pattern: Regular pattern and RR 12-20 bpm  Breath Sounds: Slightly diminished and/or crackles  Cough: Strong, spontaneous, non-productive  Indication for Bronchodilator Therapy:    Bronchodilator Assessment Score: 2    Aerosolized bronchodilator medication orders have been revised according to the RT Inhaler-Nebulizer Bronchodilator Protocol below.    Respiratory Therapist to perform RT Therapy Protocol Assessment initially then follow the protocol.  Repeat RT Therapy Protocol Assessment PRN for score 0-3 or on second treatment, BID, and PRN for scores above 3.    No Indications - adjust the frequency to every 6 hours PRN wheezing or bronchospasm, if no treatments needed after 48 hours then discontinue using Per Protocol order mode.     If indication present, adjust the RT bronchodilator orders based on the Bronchodilator Assessment Score as indicated below.  Use Inhaler orders unless patient has one or more of the following: on home nebulizer, not able to hold breath for 10 seconds, is not alert and oriented, cannot activate and use MDI correctly, or respiratory rate 25 breaths

## 2024-12-11 NOTE — PROCEDURES
PROCEDURE NOTE  Date: 12/11/2024   Name: Tato Cannon  YOB: 1940    Procedures    12 lead EKG completed. Results handed to Rachelle HANNAH.

## 2024-12-12 ENCOUNTER — APPOINTMENT (OUTPATIENT)
Dept: CT IMAGING | Age: 84
DRG: 199 | End: 2024-12-12
Payer: MEDICARE

## 2024-12-12 LAB
ALBUMIN SERPL BCG-MCNC: 3.2 G/DL (ref 3.5–5.1)
ALP SERPL-CCNC: 217 U/L (ref 38–126)
ALT SERPL W/O P-5'-P-CCNC: 39 U/L (ref 11–66)
AMORPH SED URNS QL MICRO: ABNORMAL
ANION GAP SERPL CALC-SCNC: 18 MEQ/L (ref 8–16)
ARTERIAL PATENCY WRIST A: POSITIVE
AST SERPL-CCNC: 48 U/L (ref 5–40)
BACTERIA URNS QL MICRO: ABNORMAL /HPF
BASE EXCESS BLDA CALC-SCNC: -2.8 MMOL/L (ref -2.5–2.5)
BASOPHILS ABSOLUTE: 0 THOU/MM3 (ref 0–0.1)
BASOPHILS NFR BLD AUTO: 0.2 %
BDY SITE: ABNORMAL
BILIRUB CONJ SERPL-MCNC: 0.9 MG/DL (ref 0.1–13.8)
BILIRUB SERPL-MCNC: 1.5 MG/DL (ref 0.3–1.2)
BILIRUB UR QL STRIP.AUTO: NEGATIVE
BUN SERPL-MCNC: 55 MG/DL (ref 7–22)
CALCIUM SERPL-MCNC: 8.9 MG/DL (ref 8.5–10.5)
CASTS #/AREA URNS LPF: ABNORMAL /LPF
CASTS 2: ABNORMAL /LPF
CHARACTER UR: CLEAR
CHARACTER, BODY FLUID: ABNORMAL
CHLORIDE SERPL-SCNC: 102 MEQ/L (ref 98–111)
CO2 SERPL-SCNC: 17 MEQ/L (ref 23–33)
COLLECTED BY:: ABNORMAL
COLOR FLD: ABNORMAL
COLOR, UA: YELLOW
CREAT SERPL-MCNC: 1.9 MG/DL (ref 0.4–1.2)
CREAT UR-MCNC: 57.6 MG/DL
CRYSTALS URNS MICRO: ABNORMAL
CRYSTALS URNS MICRO: ABNORMAL
DEPRECATED RDW RBC AUTO: 54.5 FL (ref 35–45)
DEVICE: ABNORMAL
EKG ATRIAL RATE: 73 BPM
EKG P AXIS: 30 DEGREES
EKG P-R INTERVAL: 174 MS
EKG Q-T INTERVAL: 384 MS
EKG QRS DURATION: 82 MS
EKG QTC CALCULATION (BAZETT): 423 MS
EKG R AXIS: -70 DEGREES
EKG T AXIS: 93 DEGREES
EKG VENTRICULAR RATE: 73 BPM
EOSINOPHIL NFR BLD AUTO: 0.3 %
EOSINOPHILS ABSOLUTE: 0 THOU/MM3 (ref 0–0.4)
EPITHELIAL CELLS, UA: ABNORMAL /HPF
ERYTHROCYTE [DISTWIDTH] IN BLOOD BY AUTOMATED COUNT: 16.7 % (ref 11.5–14.5)
FIO2 ON VENT O2 ANALYZER: 2 %
GFR SERPL CREATININE-BSD FRML MDRD: 34 ML/MIN/1.73M2
GLUCOSE BLD STRIP.AUTO-MCNC: 105 MG/DL (ref 70–108)
GLUCOSE BLD STRIP.AUTO-MCNC: 83 MG/DL (ref 70–108)
GLUCOSE BLD STRIP.AUTO-MCNC: 91 MG/DL (ref 70–108)
GLUCOSE SERPL-MCNC: 106 MG/DL (ref 70–108)
GLUCOSE UR QL STRIP.AUTO: NEGATIVE MG/DL
GRANULOCYTES NFR FLD AUTO: 0 %
HCO3 BLDA-SCNC: 20 MMOL/L (ref 23–28)
HCT VFR BLD AUTO: 34.3 % (ref 42–52)
HGB BLD-MCNC: 10.5 GM/DL (ref 14–18)
HGB UR QL STRIP.AUTO: ABNORMAL
IMM GRANULOCYTES # BLD AUTO: 0.05 THOU/MM3 (ref 0–0.07)
IMM GRANULOCYTES NFR BLD AUTO: 0.6 %
KETONES UR QL STRIP.AUTO: NEGATIVE
LACTATE SERPL-SCNC: 2.1 MMOL/L (ref 0.5–2)
LACTATE SERPL-SCNC: 2.6 MMOL/L (ref 0.5–2)
LACTATE SERPL-SCNC: 2.9 MMOL/L (ref 0.5–2)
LYMPHOCYTES ABSOLUTE: 0.9 THOU/MM3 (ref 1–4.8)
LYMPHOCYTES NFR BLD AUTO: 10.3 %
LYMPHOCYTES NFR FLD MANUAL: 7 %
MCH RBC QN AUTO: 27.9 PG (ref 26–33)
MCHC RBC AUTO-ENTMCNC: 30.6 GM/DL (ref 32.2–35.5)
MCV RBC AUTO: 91 FL (ref 80–94)
MESOTHELIAL CELLS BODY FLUID: ABNORMAL
MISCELLANEOUS 2: ABNORMAL
MISCELLANEOUS LAB TEST RESULT: ABNORMAL
MONOCYTES ABSOLUTE: 1.1 THOU/MM3 (ref 0.4–1.3)
MONOCYTES NFR BLD AUTO: 12.7 %
MONOCYTES NFR FLD MANUAL: 60 %
MONONUC CELLS NFR FLD AUTO: 0 %
MUCOUS THREADS URNS QL MICRO: ABNORMAL
NEUTROPHILS ABSOLUTE: 6.5 THOU/MM3 (ref 1.8–7.7)
NEUTROPHILS NFR BLD AUTO: 75.9 %
NEUTS SEG NFR FLD MANUAL: 33 %
NITRITE UR QL STRIP: NEGATIVE
NRBC BLD AUTO-RTO: 1 /100 WBC
NUC CELL # FLD AUTO: 9049 /CUMM (ref 0–500)
PATHOLOGIST REVIEW: ABNORMAL
PCO2 BLDA: 30 MMHG (ref 35–45)
PH BLDA: 7.45 [PH] (ref 7.35–7.45)
PH UR STRIP.AUTO: 5 [PH] (ref 5–9)
PLATELET # BLD AUTO: 166 THOU/MM3 (ref 130–400)
PMV BLD AUTO: 11 FL (ref 9.4–12.4)
PO2 BLDA: 84 MMHG (ref 71–104)
POTASSIUM SERPL-SCNC: 5.2 MEQ/L (ref 3.5–5.2)
POTASSIUM SERPL-SCNC: 5.4 MEQ/L (ref 3.5–5.2)
PROT SERPL-MCNC: 6.6 G/DL (ref 6.1–8)
PROT UR STRIP.AUTO-MCNC: ABNORMAL MG/DL
RBC # BLD AUTO: 3.77 MILL/MM3 (ref 4.7–6.1)
RBC # FLD AUTO: ABNORMAL /CUMM
RBC URINE: ABNORMAL /HPF
RENAL EPI CELLS #/AREA URNS HPF: ABNORMAL /[HPF]
SAO2 % BLDA: 97 %
SODIUM SERPL-SCNC: 137 MEQ/L (ref 135–145)
SP GR UR REFRACT.AUTO: 1.02 (ref 1–1.03)
SPECIMEN: ABNORMAL
TOTAL VOLUME RECEIVED BODY FLUID: 70 ML
UROBILINOGEN, URINE: 0.2 EU/DL (ref 0–1)
UUN 24H UR-MCNC: 396 MG/DL
VENTILATION MODE VENT: ABNORMAL
WBC # BLD AUTO: 8.6 THOU/MM3 (ref 4.8–10.8)
WBC #/AREA URNS HPF: ABNORMAL /HPF
WBC #/AREA URNS HPF: NEGATIVE /[HPF]
YEAST LIKE FUNGI URNS QL MICRO: ABNORMAL

## 2024-12-12 PROCEDURE — 51702 INSERT TEMP BLADDER CATH: CPT

## 2024-12-12 PROCEDURE — 2580000003 HC RX 258: Performed by: NURSE PRACTITIONER

## 2024-12-12 PROCEDURE — 97129 THER IVNTJ 1ST 15 MIN: CPT

## 2024-12-12 PROCEDURE — 82570 ASSAY OF URINE CREATININE: CPT

## 2024-12-12 PROCEDURE — 2580000003 HC RX 258

## 2024-12-12 PROCEDURE — 36600 WITHDRAWAL OF ARTERIAL BLOOD: CPT

## 2024-12-12 PROCEDURE — 93010 ELECTROCARDIOGRAM REPORT: CPT | Performed by: NUCLEAR MEDICINE

## 2024-12-12 PROCEDURE — 70450 CT HEAD/BRAIN W/O DYE: CPT

## 2024-12-12 PROCEDURE — 71250 CT THORAX DX C-: CPT

## 2024-12-12 PROCEDURE — 84540 ASSAY OF URINE/UREA-N: CPT

## 2024-12-12 PROCEDURE — 93005 ELECTROCARDIOGRAM TRACING: CPT

## 2024-12-12 PROCEDURE — 82803 BLOOD GASES ANY COMBINATION: CPT

## 2024-12-12 PROCEDURE — 84132 ASSAY OF SERUM POTASSIUM: CPT

## 2024-12-12 PROCEDURE — 51798 US URINE CAPACITY MEASURE: CPT

## 2024-12-12 PROCEDURE — 81001 URINALYSIS AUTO W/SCOPE: CPT

## 2024-12-12 PROCEDURE — 6360000002 HC RX W HCPCS

## 2024-12-12 PROCEDURE — 82948 REAGENT STRIP/BLOOD GLUCOSE: CPT

## 2024-12-12 PROCEDURE — 36415 COLL VENOUS BLD VENIPUNCTURE: CPT

## 2024-12-12 PROCEDURE — 83605 ASSAY OF LACTIC ACID: CPT

## 2024-12-12 PROCEDURE — 99232 SBSQ HOSP IP/OBS MODERATE 35: CPT | Performed by: NURSE PRACTITIONER

## 2024-12-12 PROCEDURE — 80048 BASIC METABOLIC PNL TOTAL CA: CPT

## 2024-12-12 PROCEDURE — 2060000000 HC ICU INTERMEDIATE R&B

## 2024-12-12 PROCEDURE — 85025 COMPLETE CBC W/AUTO DIFF WBC: CPT

## 2024-12-12 PROCEDURE — 51701 INSERT BLADDER CATHETER: CPT

## 2024-12-12 PROCEDURE — 80076 HEPATIC FUNCTION PANEL: CPT

## 2024-12-12 PROCEDURE — 99233 SBSQ HOSP IP/OBS HIGH 50: CPT | Performed by: STUDENT IN AN ORGANIZED HEALTH CARE EDUCATION/TRAINING PROGRAM

## 2024-12-12 RX ORDER — BUMETANIDE 0.25 MG/ML
0.5 INJECTION, SOLUTION INTRAMUSCULAR; INTRAVENOUS ONCE
Status: COMPLETED | OUTPATIENT
Start: 2024-12-12 | End: 2024-12-12

## 2024-12-12 RX ADMIN — BUMETANIDE 0.5 MG: 0.25 INJECTION INTRAMUSCULAR; INTRAVENOUS at 05:50

## 2024-12-12 RX ADMIN — WATER 1000 MG: 1 INJECTION INTRAMUSCULAR; INTRAVENOUS; SUBCUTANEOUS at 16:04

## 2024-12-12 RX ADMIN — SODIUM CHLORIDE, PRESERVATIVE FREE 10 ML: 5 INJECTION INTRAVENOUS at 22:30

## 2024-12-12 RX ADMIN — SODIUM CHLORIDE, PRESERVATIVE FREE 10 ML: 5 INJECTION INTRAVENOUS at 15:09

## 2024-12-12 RX ADMIN — AZITHROMYCIN MONOHYDRATE 500 MG: 500 INJECTION, POWDER, LYOPHILIZED, FOR SOLUTION INTRAVENOUS at 15:08

## 2024-12-12 ASSESSMENT — PAIN SCALES - GENERAL
PAINLEVEL_OUTOF10: 0
PAINLEVEL_OUTOF10: 0

## 2024-12-12 NOTE — PROCEDURES
PROCEDURE NOTE  Date: 12/12/2024   Name: Tato Cannon  YOB: 1940    Procedures  12 lead EKG completed. Results handed to Vida Fox CET

## 2024-12-12 NOTE — PALLIATIVE CARE
Initial Evaluation        Patient:   Tato Cannon  YOB: 1940  Age:  84 y.o.  Room:  [facility]  MRN:  [id number]   Acct: [id number]    Date of Admission:  12/9/2024  7:34 PM  Date of Service:  12/12/2024  Completed By:  Cliff Lassiter RN        Reason for Palliative Care Evaluation:-   Goals of Care     Current Concerns   Patient confused      Palliative Performance Scale   50%  Mainly sit/lie; Extensive disease; Can't do any work; Considerable assist; intake normal or reduced; LOC full/confusion     History    Patient admitted 12/9 from home after fall. Patient with multiple right rib fractures with pulmonary contusion, apical pneumothorax, L4 compression fracture, bilateral pleural effusions. Other chronic conditions include A-fib, HF, HTN.      Goals of Care Discussions and Plan         Family/Patient Discussion:- Patient confused with sitter at bedside. Case reviewed with nurse. No family at beside at present. Will attempt to meet family at bedside at a later time.     Plan/Follow-Up:-   Palliative care will continue to follow, please call for additional needs.    Treatment Limitations: did not assess at this time    Code Status:Full Code No additional code details    ACP documents on file:  -None    Healthcare Power of /Healthcare Surrogate Decision Makers:  Per [place] Code 2133.08: [place] Hierarchy of Surrogate Decision Makers Silas Cannon (son) and Kristel Thomson (daughter)            Electronically signed by Cliff Lassiter RN on 12/12/2024 at 4:49 PM           Palliative Care Office: [phone]

## 2024-12-13 ENCOUNTER — APPOINTMENT (OUTPATIENT)
Dept: GENERAL RADIOLOGY | Age: 84
DRG: 199 | End: 2024-12-13
Payer: MEDICARE

## 2024-12-13 LAB
ALBUMIN SERPL BCG-MCNC: 2.7 G/DL (ref 3.5–5.1)
ALP SERPL-CCNC: 194 U/L (ref 38–126)
ALT SERPL W/O P-5'-P-CCNC: 35 U/L (ref 11–66)
AMMONIA PLAS-MCNC: 24 UMOL/L (ref 11–60)
ANION GAP SERPL CALC-SCNC: 19 MEQ/L (ref 8–16)
AST SERPL-CCNC: 42 U/L (ref 5–40)
B-OH-BUTYR SERPL-MSCNC: 11.44 MG/DL (ref 0.2–2.81)
BASE EXCESS BLDA CALC-SCNC: -0.5 MMOL/L (ref -2–3)
BASOPHILS ABSOLUTE: 0 THOU/MM3 (ref 0–0.1)
BASOPHILS NFR BLD AUTO: 0.3 %
BILIRUB CONJ SERPL-MCNC: 0.9 MG/DL (ref 0.1–13.8)
BILIRUB SERPL-MCNC: 1.5 MG/DL (ref 0.3–1.2)
BUN SERPL-MCNC: 53 MG/DL (ref 7–22)
CALCIUM SERPL-MCNC: 9 MG/DL (ref 8.5–10.5)
CHLORIDE SERPL-SCNC: 110 MEQ/L (ref 98–111)
CO2 SERPL-SCNC: 16 MEQ/L (ref 23–33)
COLLECTED BY:: ABNORMAL
CREAT SERPL-MCNC: 1.6 MG/DL (ref 0.4–1.2)
DEPRECATED RDW RBC AUTO: 54.2 FL (ref 35–45)
DEVICE: ABNORMAL
EKG Q-T INTERVAL: 358 MS
EKG QRS DURATION: 106 MS
EKG QTC CALCULATION (BAZETT): 493 MS
EKG R AXIS: -63 DEGREES
EKG T AXIS: 56 DEGREES
EKG VENTRICULAR RATE: 114 BPM
EOSINOPHIL NFR BLD AUTO: 0.9 %
EOSINOPHILS ABSOLUTE: 0.1 THOU/MM3 (ref 0–0.4)
ERYTHROCYTE [DISTWIDTH] IN BLOOD BY AUTOMATED COUNT: 16.7 % (ref 11.5–14.5)
GFR SERPL CREATININE-BSD FRML MDRD: 42 ML/MIN/1.73M2
GLUCOSE BLD STRIP.AUTO-MCNC: 75 MG/DL (ref 70–108)
GLUCOSE BLD STRIP.AUTO-MCNC: 80 MG/DL (ref 70–108)
GLUCOSE BLD STRIP.AUTO-MCNC: 80 MG/DL (ref 70–108)
GLUCOSE BLD STRIP.AUTO-MCNC: 88 MG/DL (ref 70–108)
GLUCOSE BLD STRIP.AUTO-MCNC: 95 MG/DL (ref 70–108)
GLUCOSE SERPL-MCNC: 84 MG/DL (ref 70–108)
HCO3 BLDA-SCNC: 23 MMOL/L (ref 23–28)
HCT VFR BLD AUTO: 32.8 % (ref 42–52)
HGB BLD-MCNC: 10.1 GM/DL (ref 14–18)
IMM GRANULOCYTES # BLD AUTO: 0.05 THOU/MM3 (ref 0–0.07)
IMM GRANULOCYTES NFR BLD AUTO: 0.7 %
LYMPHOCYTES ABSOLUTE: 0.7 THOU/MM3 (ref 1–4.8)
LYMPHOCYTES NFR BLD AUTO: 9.6 %
MCH RBC QN AUTO: 28 PG (ref 26–33)
MCHC RBC AUTO-ENTMCNC: 30.8 GM/DL (ref 32.2–35.5)
MCV RBC AUTO: 90.9 FL (ref 80–94)
MONOCYTES ABSOLUTE: 1.1 THOU/MM3 (ref 0.4–1.3)
MONOCYTES NFR BLD AUTO: 14.6 %
NEUTROPHILS ABSOLUTE: 5.5 THOU/MM3 (ref 1.8–7.7)
NEUTROPHILS NFR BLD AUTO: 73.9 %
NRBC BLD AUTO-RTO: 0 /100 WBC
PCO2 TEMP ADJ BLDMV: 34 MMHG (ref 41–51)
PH BLDMV: 7.45 [PH] (ref 7.31–7.41)
PLATELET # BLD AUTO: 176 THOU/MM3 (ref 130–400)
PMV BLD AUTO: 10.5 FL (ref 9.4–12.4)
PO2 BLDMV: 23 MMHG (ref 25–40)
POTASSIUM SERPL-SCNC: 4.6 MEQ/L (ref 3.5–5.2)
PROT SERPL-MCNC: 6.2 G/DL (ref 6.1–8)
RBC # BLD AUTO: 3.61 MILL/MM3 (ref 4.7–6.1)
SAO2 % BLDMV: 42 %
SITE: ABNORMAL
SODIUM SERPL-SCNC: 145 MEQ/L (ref 135–145)
VENTILATION MODE VENT: ABNORMAL
WBC # BLD AUTO: 7.5 THOU/MM3 (ref 4.8–10.8)

## 2024-12-13 PROCEDURE — 36415 COLL VENOUS BLD VENIPUNCTURE: CPT

## 2024-12-13 PROCEDURE — 92610 EVALUATE SWALLOWING FUNCTION: CPT

## 2024-12-13 PROCEDURE — 99232 SBSQ HOSP IP/OBS MODERATE 35: CPT | Performed by: NURSE PRACTITIONER

## 2024-12-13 PROCEDURE — 93010 ELECTROCARDIOGRAM REPORT: CPT | Performed by: NUCLEAR MEDICINE

## 2024-12-13 PROCEDURE — 82948 REAGENT STRIP/BLOOD GLUCOSE: CPT

## 2024-12-13 PROCEDURE — 2580000003 HC RX 258

## 2024-12-13 PROCEDURE — 37799 UNLISTED PX VASCULAR SURGERY: CPT

## 2024-12-13 PROCEDURE — 80053 COMPREHEN METABOLIC PANEL: CPT

## 2024-12-13 PROCEDURE — 2060000000 HC ICU INTERMEDIATE R&B

## 2024-12-13 PROCEDURE — 2500000003 HC RX 250 WO HCPCS

## 2024-12-13 PROCEDURE — 2700000000 HC OXYGEN THERAPY PER DAY

## 2024-12-13 PROCEDURE — 82010 KETONE BODYS QUAN: CPT

## 2024-12-13 PROCEDURE — 2580000003 HC RX 258: Performed by: NURSE PRACTITIONER

## 2024-12-13 PROCEDURE — 82803 BLOOD GASES ANY COMBINATION: CPT

## 2024-12-13 PROCEDURE — 74018 RADEX ABDOMEN 1 VIEW: CPT

## 2024-12-13 PROCEDURE — 99233 SBSQ HOSP IP/OBS HIGH 50: CPT | Performed by: STUDENT IN AN ORGANIZED HEALTH CARE EDUCATION/TRAINING PROGRAM

## 2024-12-13 PROCEDURE — 6360000002 HC RX W HCPCS

## 2024-12-13 PROCEDURE — 85025 COMPLETE CBC W/AUTO DIFF WBC: CPT

## 2024-12-13 PROCEDURE — 6370000000 HC RX 637 (ALT 250 FOR IP): Performed by: NURSE PRACTITIONER

## 2024-12-13 PROCEDURE — 93005 ELECTROCARDIOGRAM TRACING: CPT | Performed by: STUDENT IN AN ORGANIZED HEALTH CARE EDUCATION/TRAINING PROGRAM

## 2024-12-13 PROCEDURE — 82140 ASSAY OF AMMONIA: CPT

## 2024-12-13 PROCEDURE — 82248 BILIRUBIN DIRECT: CPT

## 2024-12-13 PROCEDURE — 94761 N-INVAS EAR/PLS OXIMETRY MLT: CPT

## 2024-12-13 RX ORDER — METOPROLOL TARTRATE 1 MG/ML
5 INJECTION, SOLUTION INTRAVENOUS ONCE
Status: COMPLETED | OUTPATIENT
Start: 2024-12-13 | End: 2024-12-13

## 2024-12-13 RX ORDER — METOPROLOL TARTRATE 25 MG/1
25 TABLET, FILM COATED ORAL 2 TIMES DAILY
Status: DISCONTINUED | OUTPATIENT
Start: 2024-12-13 | End: 2024-12-13

## 2024-12-13 RX ADMIN — METOPROLOL TARTRATE 5 MG: 5 INJECTION INTRAVENOUS at 23:07

## 2024-12-13 RX ADMIN — DOXYCYCLINE 100 MG: 100 INJECTION, POWDER, LYOPHILIZED, FOR SOLUTION INTRAVENOUS at 09:16

## 2024-12-13 RX ADMIN — SODIUM BICARBONATE: 84 INJECTION, SOLUTION INTRAVENOUS at 12:21

## 2024-12-13 RX ADMIN — SODIUM CHLORIDE, PRESERVATIVE FREE 10 ML: 5 INJECTION INTRAVENOUS at 09:16

## 2024-12-13 RX ADMIN — SODIUM CHLORIDE, PRESERVATIVE FREE 10 ML: 5 INJECTION INTRAVENOUS at 22:57

## 2024-12-13 RX ADMIN — DOXYCYCLINE 100 MG: 100 INJECTION, POWDER, LYOPHILIZED, FOR SOLUTION INTRAVENOUS at 22:57

## 2024-12-13 RX ADMIN — METOPROLOL TARTRATE 5 MG: 5 INJECTION INTRAVENOUS at 06:25

## 2024-12-13 RX ADMIN — WATER 1000 MG: 1 INJECTION INTRAMUSCULAR; INTRAVENOUS; SUBCUTANEOUS at 16:22

## 2024-12-13 NOTE — SIGNIFICANT EVENT
I had a conversation with Henry Matos with palliative care today; Ms. Matos informed me that she spoke to patient's son Silas in the morning who was busy at work. Mrs. Matos spoke with patient's daughter Kristel.  Kristel stated she had a conversation with Silas and there other sibling who is a nurse about patient's CODE STATUS last night; family was in agreement that patient's CODE STATUS should be changed to DNR-CCA. Please refer to Ester Shawna note for further details.

## 2024-12-13 NOTE — PALLIATIVE CARE
.Advance Care Planning     Palliative Team Advance Care Planning (ACP) Conversation    Date of Conversation: 12/13/24    Individuals present for the conversation: Daughter Kristel Thomson      ACP documents on file prior to discussion:  -None    Previously completed document/s not on file: Not discussed.    Healthcare Decision Maker:    Primary Decision Maker: Kristel Thomson - Child - [phone]    Primary Decision Maker: Silas Cannon - Child - [phone]     Conversation Summary:  Call placed to Jose Ramon (son) as follow up on code status and previous conversation with physician. Jose Ramon is at work and unable to speak at this time. Call placed to Kristel (daughter) to discuss code status. Kristel stated she has talked with Jose Ramon and with her daughter who is an RN. They agreed that if the patient is unable to make the decision right now on his own code status they believe he would want to be a DNR based on their past conversations with him. She has noted his repeat trips to the hospital and how he has signed out AMA before. She noted he often talks about not feeling well at home and may not be compliant with his plan of care at home. She noted that they all agree that they do not want to prolong his suffering and life if his heart were to stop. We discussed intubation. The family did not discuss if they believe he would want intubated or not. We agreed to have further conversation at a later time about intubation but for now she agreed to intubation if needed. They wish for him to continue to be treated for all other illness at this time. This is consistent with a DNRCC-A code status.     Dr. Mcadams updated on conversation. Dr. Mata updated on conversation. Orders for DNRCC-A placed. Will continue further conversations with family on intubation status.    Resuscitation Status: DNRCC-A    Documentation Completed:  -No new documents completed.    I spent 30 minutes with the patient and/or surrogate decision maker

## 2024-12-13 NOTE — PROCEDURES
PROCEDURE NOTE  Date: 12/13/2024   Name: Tato Cannon  YOB: 1940    Procedures    12 lead EKG completed. Results handed to RN

## 2024-12-14 ENCOUNTER — APPOINTMENT (OUTPATIENT)
Dept: GENERAL RADIOLOGY | Age: 84
DRG: 199 | End: 2024-12-14
Payer: MEDICARE

## 2024-12-14 LAB
ALBUMIN SERPL BCG-MCNC: 2.7 G/DL (ref 3.5–5.1)
ALP SERPL-CCNC: 175 U/L (ref 38–126)
ALT SERPL W/O P-5'-P-CCNC: 32 U/L (ref 11–66)
ANION GAP SERPL CALC-SCNC: 12 MEQ/L (ref 8–16)
APTT PPP: 31.4 SECONDS (ref 22–38)
APTT PPP: 58.8 SECONDS (ref 22–38)
APTT PPP: 62.6 SECONDS (ref 22–38)
APTT PPP: 65.8 SECONDS (ref 22–38)
AST SERPL-CCNC: 37 U/L (ref 5–40)
BILIRUB CONJ SERPL-MCNC: 0.8 MG/DL (ref 0.1–13.8)
BILIRUB SERPL-MCNC: 1.2 MG/DL (ref 0.3–1.2)
BUN SERPL-MCNC: 40 MG/DL (ref 7–22)
CALCIUM SERPL-MCNC: 8.6 MG/DL (ref 8.5–10.5)
CHLORIDE SERPL-SCNC: 107 MEQ/L (ref 98–111)
CO2 SERPL-SCNC: 22 MEQ/L (ref 23–33)
CREAT SERPL-MCNC: 1.2 MG/DL (ref 0.4–1.2)
DEPRECATED RDW RBC AUTO: 53.5 FL (ref 35–45)
ERYTHROCYTE [DISTWIDTH] IN BLOOD BY AUTOMATED COUNT: 16.6 % (ref 11.5–14.5)
GFR SERPL CREATININE-BSD FRML MDRD: 60 ML/MIN/1.73M2
GLUCOSE BLD STRIP.AUTO-MCNC: 100 MG/DL (ref 70–108)
GLUCOSE BLD STRIP.AUTO-MCNC: 163 MG/DL (ref 70–108)
GLUCOSE SERPL-MCNC: 90 MG/DL (ref 70–108)
HCT VFR BLD AUTO: 32.2 % (ref 42–52)
HEPARIN UNFRACTIONATED: < 0.04 U/ML (ref 0.3–0.7)
HGB BLD-MCNC: 9.9 GM/DL (ref 14–18)
INR PPP: 1.4 (ref 0.85–1.13)
MCH RBC QN AUTO: 28.4 PG (ref 26–33)
MCHC RBC AUTO-ENTMCNC: 30.7 GM/DL (ref 32.2–35.5)
MCV RBC AUTO: 92.5 FL (ref 80–94)
PLATELET # BLD AUTO: 173 THOU/MM3 (ref 130–400)
PMV BLD AUTO: 10 FL (ref 9.4–12.4)
POTASSIUM SERPL-SCNC: 3.8 MEQ/L (ref 3.5–5.2)
PROT SERPL-MCNC: 5.9 G/DL (ref 6.1–8)
RBC # BLD AUTO: 3.48 MILL/MM3 (ref 4.7–6.1)
SODIUM SERPL-SCNC: 141 MEQ/L (ref 135–145)
WBC # BLD AUTO: 6.6 THOU/MM3 (ref 4.8–10.8)

## 2024-12-14 PROCEDURE — 82948 REAGENT STRIP/BLOOD GLUCOSE: CPT

## 2024-12-14 PROCEDURE — 85520 HEPARIN ASSAY: CPT

## 2024-12-14 PROCEDURE — 2060000000 HC ICU INTERMEDIATE R&B

## 2024-12-14 PROCEDURE — 2500000003 HC RX 250 WO HCPCS: Performed by: STUDENT IN AN ORGANIZED HEALTH CARE EDUCATION/TRAINING PROGRAM

## 2024-12-14 PROCEDURE — 6370000000 HC RX 637 (ALT 250 FOR IP): Performed by: NURSE PRACTITIONER

## 2024-12-14 PROCEDURE — 2580000003 HC RX 258: Performed by: NURSE PRACTITIONER

## 2024-12-14 PROCEDURE — 6370000000 HC RX 637 (ALT 250 FOR IP)

## 2024-12-14 PROCEDURE — 6360000002 HC RX W HCPCS

## 2024-12-14 PROCEDURE — 85027 COMPLETE CBC AUTOMATED: CPT

## 2024-12-14 PROCEDURE — 82248 BILIRUBIN DIRECT: CPT

## 2024-12-14 PROCEDURE — 2500000003 HC RX 250 WO HCPCS

## 2024-12-14 PROCEDURE — 85730 THROMBOPLASTIN TIME PARTIAL: CPT

## 2024-12-14 PROCEDURE — 74230 X-RAY XM SWLNG FUNCJ C+: CPT

## 2024-12-14 PROCEDURE — 2580000003 HC RX 258

## 2024-12-14 PROCEDURE — 99232 SBSQ HOSP IP/OBS MODERATE 35: CPT | Performed by: STUDENT IN AN ORGANIZED HEALTH CARE EDUCATION/TRAINING PROGRAM

## 2024-12-14 PROCEDURE — 92526 ORAL FUNCTION THERAPY: CPT

## 2024-12-14 PROCEDURE — 36415 COLL VENOUS BLD VENIPUNCTURE: CPT

## 2024-12-14 PROCEDURE — 85610 PROTHROMBIN TIME: CPT

## 2024-12-14 PROCEDURE — 80053 COMPREHEN METABOLIC PANEL: CPT

## 2024-12-14 PROCEDURE — 92611 MOTION FLUOROSCOPY/SWALLOW: CPT

## 2024-12-14 RX ORDER — HEPARIN SODIUM 1000 [USP'U]/ML
4000 INJECTION, SOLUTION INTRAVENOUS; SUBCUTANEOUS PRN
Status: DISCONTINUED | OUTPATIENT
Start: 2024-12-14 | End: 2024-12-26

## 2024-12-14 RX ORDER — METOPROLOL TARTRATE 25 MG/1
12.5 TABLET, FILM COATED ORAL 2 TIMES DAILY
Status: DISCONTINUED | OUTPATIENT
Start: 2024-12-14 | End: 2024-12-15

## 2024-12-14 RX ORDER — HEPARIN SODIUM 1000 [USP'U]/ML
2000 INJECTION, SOLUTION INTRAVENOUS; SUBCUTANEOUS PRN
Status: DISCONTINUED | OUTPATIENT
Start: 2024-12-14 | End: 2024-12-26

## 2024-12-14 RX ORDER — LEVOTHYROXINE SODIUM 50 UG/1
50 TABLET ORAL ONCE
Status: COMPLETED | OUTPATIENT
Start: 2024-12-14 | End: 2024-12-14

## 2024-12-14 RX ORDER — HEPARIN SODIUM 1000 [USP'U]/ML
4000 INJECTION, SOLUTION INTRAVENOUS; SUBCUTANEOUS ONCE
Status: COMPLETED | OUTPATIENT
Start: 2024-12-14 | End: 2024-12-14

## 2024-12-14 RX ORDER — HEPARIN SODIUM 10000 [USP'U]/100ML
5-30 INJECTION, SOLUTION INTRAVENOUS CONTINUOUS
Status: DISCONTINUED | OUTPATIENT
Start: 2024-12-14 | End: 2024-12-26

## 2024-12-14 RX ADMIN — LEVOTHYROXINE SODIUM 50 MCG: 0.05 TABLET ORAL at 17:09

## 2024-12-14 RX ADMIN — WATER 1000 MG: 1 INJECTION INTRAMUSCULAR; INTRAVENOUS; SUBCUTANEOUS at 15:24

## 2024-12-14 RX ADMIN — METHOCARBAMOL 1000 MG: 500 TABLET ORAL at 11:54

## 2024-12-14 RX ADMIN — SODIUM CHLORIDE, PRESERVATIVE FREE 10 ML: 5 INJECTION INTRAVENOUS at 08:36

## 2024-12-14 RX ADMIN — AMIODARONE HYDROCHLORIDE 150 MG: 1.5 INJECTION, SOLUTION INTRAVENOUS at 02:23

## 2024-12-14 RX ADMIN — HEPARIN SODIUM 4000 UNITS: 1000 INJECTION INTRAVENOUS; SUBCUTANEOUS at 02:31

## 2024-12-14 RX ADMIN — METOPROLOL TARTRATE 12.5 MG: 25 TABLET, FILM COATED ORAL at 20:11

## 2024-12-14 RX ADMIN — SODIUM CHLORIDE, PRESERVATIVE FREE 10 ML: 5 INJECTION INTRAVENOUS at 20:05

## 2024-12-14 RX ADMIN — AMIODARONE HYDROCHLORIDE 1 MG/MIN: 1.8 INJECTION, SOLUTION INTRAVENOUS at 21:15

## 2024-12-14 RX ADMIN — BARIUM SULFATE 50 ML: 400 SUSPENSION ORAL at 11:08

## 2024-12-14 RX ADMIN — METHOCARBAMOL 1000 MG: 500 TABLET ORAL at 17:09

## 2024-12-14 RX ADMIN — HEPARIN SODIUM 11 UNITS/KG/HR: 10000 INJECTION, SOLUTION INTRAVENOUS at 02:33

## 2024-12-14 RX ADMIN — DOXYCYCLINE 100 MG: 100 INJECTION, POWDER, LYOPHILIZED, FOR SOLUTION INTRAVENOUS at 08:36

## 2024-12-14 RX ADMIN — BARIUM SULFATE 30 ML: 400 PASTE ORAL at 11:08

## 2024-12-14 RX ADMIN — AMIODARONE HYDROCHLORIDE 1 MG/MIN: 1.8 INJECTION, SOLUTION INTRAVENOUS at 02:36

## 2024-12-14 RX ADMIN — DOXYCYCLINE 100 MG: 100 INJECTION, POWDER, LYOPHILIZED, FOR SOLUTION INTRAVENOUS at 20:18

## 2024-12-14 RX ADMIN — BARIUM SULFATE 100 ML: 0.81 POWDER, FOR SUSPENSION ORAL at 11:08

## 2024-12-14 RX ADMIN — METHOCARBAMOL 1000 MG: 500 TABLET ORAL at 20:05

## 2024-12-14 NOTE — SIGNIFICANT EVENT
PerfectServe received at 10:46 PM indicating the patient is in atrial fibrillation with RVR, heart rate 118-120s.  He was given Lopressor 5 mg IV.  Patient is unable to take medications p.o. due to high aspiration risk and failing SLP evaluation on 12/13/2024.  PerfectServe received at 11:01 PM that patient continues to be tachycardic with HR 130s.  He has not received Eliquis since 1255 on 12/11/2024.  Plan to start heparin drip and amiodarone bolus followed by drip for rate control.  Patient was evaluated at bedside resting comfortably with live sitter present.

## 2024-12-15 LAB
ALBUMIN SERPL BCG-MCNC: 2.7 G/DL (ref 3.5–5.1)
ALP SERPL-CCNC: 190 U/L (ref 38–126)
ALT SERPL W/O P-5'-P-CCNC: 31 U/L (ref 11–66)
ANION GAP SERPL CALC-SCNC: 12 MEQ/L (ref 8–16)
APTT PPP: 56.3 SECONDS (ref 22–38)
APTT PPP: 62.9 SECONDS (ref 22–38)
APTT PPP: 73.9 SECONDS (ref 22–38)
AST SERPL-CCNC: 34 U/L (ref 5–40)
BILIRUB CONJ SERPL-MCNC: 0.6 MG/DL (ref 0.1–13.8)
BILIRUB SERPL-MCNC: 1 MG/DL (ref 0.3–1.2)
BUN SERPL-MCNC: 33 MG/DL (ref 7–22)
CALCIUM SERPL-MCNC: 8.6 MG/DL (ref 8.5–10.5)
CHLORIDE SERPL-SCNC: 109 MEQ/L (ref 98–111)
CO2 SERPL-SCNC: 19 MEQ/L (ref 23–33)
CREAT SERPL-MCNC: 1 MG/DL (ref 0.4–1.2)
DEPRECATED RDW RBC AUTO: 56.6 FL (ref 35–45)
ERYTHROCYTE [DISTWIDTH] IN BLOOD BY AUTOMATED COUNT: 16.9 % (ref 11.5–14.5)
GFR SERPL CREATININE-BSD FRML MDRD: 74 ML/MIN/1.73M2
GLUCOSE SERPL-MCNC: 131 MG/DL (ref 70–108)
HCT VFR BLD AUTO: 35.6 % (ref 42–52)
HGB BLD-MCNC: 10.6 GM/DL (ref 14–18)
MCH RBC QN AUTO: 28.3 PG (ref 26–33)
MCHC RBC AUTO-ENTMCNC: 29.8 GM/DL (ref 32.2–35.5)
MCV RBC AUTO: 95.2 FL (ref 80–94)
PLATELET # BLD AUTO: 182 THOU/MM3 (ref 130–400)
PMV BLD AUTO: 10 FL (ref 9.4–12.4)
POTASSIUM SERPL-SCNC: 4.2 MEQ/L (ref 3.5–5.2)
PROT SERPL-MCNC: 5.7 G/DL (ref 6.1–8)
RBC # BLD AUTO: 3.74 MILL/MM3 (ref 4.7–6.1)
SODIUM SERPL-SCNC: 140 MEQ/L (ref 135–145)
WBC # BLD AUTO: 8.3 THOU/MM3 (ref 4.8–10.8)

## 2024-12-15 PROCEDURE — 6370000000 HC RX 637 (ALT 250 FOR IP): Performed by: NURSE PRACTITIONER

## 2024-12-15 PROCEDURE — 80053 COMPREHEN METABOLIC PANEL: CPT

## 2024-12-15 PROCEDURE — 85730 THROMBOPLASTIN TIME PARTIAL: CPT

## 2024-12-15 PROCEDURE — 2500000003 HC RX 250 WO HCPCS

## 2024-12-15 PROCEDURE — 2060000000 HC ICU INTERMEDIATE R&B

## 2024-12-15 PROCEDURE — 6370000000 HC RX 637 (ALT 250 FOR IP)

## 2024-12-15 PROCEDURE — 36415 COLL VENOUS BLD VENIPUNCTURE: CPT

## 2024-12-15 PROCEDURE — 2580000003 HC RX 258

## 2024-12-15 PROCEDURE — 6360000002 HC RX W HCPCS

## 2024-12-15 PROCEDURE — 2580000003 HC RX 258: Performed by: NURSE PRACTITIONER

## 2024-12-15 PROCEDURE — 82248 BILIRUBIN DIRECT: CPT

## 2024-12-15 PROCEDURE — 2500000003 HC RX 250 WO HCPCS: Performed by: STUDENT IN AN ORGANIZED HEALTH CARE EDUCATION/TRAINING PROGRAM

## 2024-12-15 PROCEDURE — 6370000000 HC RX 637 (ALT 250 FOR IP): Performed by: STUDENT IN AN ORGANIZED HEALTH CARE EDUCATION/TRAINING PROGRAM

## 2024-12-15 PROCEDURE — 85027 COMPLETE CBC AUTOMATED: CPT

## 2024-12-15 PROCEDURE — 99232 SBSQ HOSP IP/OBS MODERATE 35: CPT | Performed by: STUDENT IN AN ORGANIZED HEALTH CARE EDUCATION/TRAINING PROGRAM

## 2024-12-15 PROCEDURE — 99232 SBSQ HOSP IP/OBS MODERATE 35: CPT | Performed by: PHYSICIAN ASSISTANT

## 2024-12-15 RX ORDER — AMIODARONE HYDROCHLORIDE 200 MG/1
200 TABLET ORAL 2 TIMES DAILY
Status: DISCONTINUED | OUTPATIENT
Start: 2024-12-16 | End: 2024-12-16

## 2024-12-15 RX ORDER — METOPROLOL TARTRATE 1 MG/ML
5 INJECTION, SOLUTION INTRAVENOUS ONCE
Status: DISCONTINUED | OUTPATIENT
Start: 2024-12-15 | End: 2024-12-15

## 2024-12-15 RX ORDER — METOPROLOL TARTRATE 25 MG/1
25 TABLET, FILM COATED ORAL DAILY
Status: DISCONTINUED | OUTPATIENT
Start: 2024-12-15 | End: 2024-12-16

## 2024-12-15 RX ORDER — METOPROLOL TARTRATE 25 MG/1
25 TABLET, FILM COATED ORAL DAILY
Status: DISCONTINUED | OUTPATIENT
Start: 2024-12-16 | End: 2024-12-15

## 2024-12-15 RX ADMIN — METHOCARBAMOL 1000 MG: 500 TABLET ORAL at 09:00

## 2024-12-15 RX ADMIN — METOPROLOL TARTRATE 25 MG: 25 TABLET, FILM COATED ORAL at 21:52

## 2024-12-15 RX ADMIN — METHOCARBAMOL 1000 MG: 500 TABLET ORAL at 21:53

## 2024-12-15 RX ADMIN — HEPARIN SODIUM 15 UNITS/KG/HR: 10000 INJECTION, SOLUTION INTRAVENOUS at 20:26

## 2024-12-15 RX ADMIN — AMIODARONE HYDROCHLORIDE 0.5 MG/MIN: 1.8 INJECTION, SOLUTION INTRAVENOUS at 20:24

## 2024-12-15 RX ADMIN — SODIUM CHLORIDE, PRESERVATIVE FREE 10 ML: 5 INJECTION INTRAVENOUS at 09:01

## 2024-12-15 RX ADMIN — AMIODARONE HYDROCHLORIDE 1 MG/MIN: 1.8 INJECTION, SOLUTION INTRAVENOUS at 11:16

## 2024-12-15 RX ADMIN — HEPARIN SODIUM 13 UNITS/KG/HR: 10000 INJECTION, SOLUTION INTRAVENOUS at 01:26

## 2024-12-15 RX ADMIN — DOXYCYCLINE 100 MG: 100 INJECTION, POWDER, LYOPHILIZED, FOR SOLUTION INTRAVENOUS at 10:16

## 2024-12-15 RX ADMIN — FAMOTIDINE 20 MG: 20 TABLET, FILM COATED ORAL at 09:04

## 2024-12-15 RX ADMIN — METHOCARBAMOL 1000 MG: 500 TABLET ORAL at 17:12

## 2024-12-15 RX ADMIN — WATER 1000 MG: 1 INJECTION INTRAMUSCULAR; INTRAVENOUS; SUBCUTANEOUS at 14:59

## 2024-12-15 RX ADMIN — METHOCARBAMOL 1000 MG: 500 TABLET ORAL at 14:05

## 2024-12-15 RX ADMIN — DOXYCYCLINE 100 MG: 100 INJECTION, POWDER, LYOPHILIZED, FOR SOLUTION INTRAVENOUS at 21:58

## 2024-12-15 RX ADMIN — SODIUM CHLORIDE, PRESERVATIVE FREE 10 ML: 5 INJECTION INTRAVENOUS at 21:54

## 2024-12-15 RX ADMIN — AMIODARONE HYDROCHLORIDE 0.5 MG/MIN: 1.8 INJECTION, SOLUTION INTRAVENOUS at 03:24

## 2024-12-15 ASSESSMENT — PAIN SCALES - GENERAL: PAINLEVEL_OUTOF10: 0

## 2024-12-15 ASSESSMENT — PAIN DESCRIPTION - DESCRIPTORS: DESCRIPTORS: ACHING

## 2024-12-15 ASSESSMENT — PAIN DESCRIPTION - LOCATION
LOCATION: RIB CAGE
LOCATION: RIB CAGE

## 2024-12-16 ENCOUNTER — APPOINTMENT (OUTPATIENT)
Dept: GENERAL RADIOLOGY | Age: 84
DRG: 199 | End: 2024-12-16
Payer: MEDICARE

## 2024-12-16 LAB
ALBUMIN SERPL BCG-MCNC: 2.5 G/DL (ref 3.5–5.1)
ALP SERPL-CCNC: 184 U/L (ref 38–126)
ALT SERPL W/O P-5'-P-CCNC: 28 U/L (ref 11–66)
ANION GAP SERPL CALC-SCNC: 11 MEQ/L (ref 8–16)
APTT PPP: 51.2 SECONDS (ref 22–38)
APTT PPP: 98.2 SECONDS (ref 22–38)
AST SERPL-CCNC: 35 U/L (ref 5–40)
BACTERIA SPEC ANAEROBE CULT: NORMAL
BACTERIA SPEC BFLD CULT: NORMAL
BILIRUB CONJ SERPL-MCNC: 0.3 MG/DL (ref 0.1–13.8)
BILIRUB SERPL-MCNC: 0.8 MG/DL (ref 0.3–1.2)
BUN SERPL-MCNC: 28 MG/DL (ref 7–22)
CALCIUM SERPL-MCNC: 8.2 MG/DL (ref 8.5–10.5)
CHLORIDE SERPL-SCNC: 109 MEQ/L (ref 98–111)
CO2 SERPL-SCNC: 23 MEQ/L (ref 23–33)
CREAT SERPL-MCNC: 1 MG/DL (ref 0.4–1.2)
DEPRECATED RDW RBC AUTO: 54.9 FL (ref 35–45)
ERYTHROCYTE [DISTWIDTH] IN BLOOD BY AUTOMATED COUNT: 16.9 % (ref 11.5–14.5)
GFR SERPL CREATININE-BSD FRML MDRD: 74 ML/MIN/1.73M2
GLUCOSE BLD STRIP.AUTO-MCNC: 105 MG/DL (ref 70–108)
GLUCOSE SERPL-MCNC: 98 MG/DL (ref 70–108)
GRAM STN SPEC: NORMAL
HCT VFR BLD AUTO: 33.6 % (ref 42–52)
HEPARIN UNFRACTIONATED: 0.1 U/ML (ref 0.3–0.7)
HGB BLD-MCNC: 10.3 GM/DL (ref 14–18)
MCH RBC QN AUTO: 28.3 PG (ref 26–33)
MCHC RBC AUTO-ENTMCNC: 30.7 GM/DL (ref 32.2–35.5)
MCV RBC AUTO: 92.3 FL (ref 80–94)
PLATELET # BLD AUTO: 216 THOU/MM3 (ref 130–400)
PMV BLD AUTO: 10.7 FL (ref 9.4–12.4)
POTASSIUM SERPL-SCNC: 4.3 MEQ/L (ref 3.5–5.2)
PROT SERPL-MCNC: 5.6 G/DL (ref 6.1–8)
RBC # BLD AUTO: 3.64 MILL/MM3 (ref 4.7–6.1)
SODIUM SERPL-SCNC: 143 MEQ/L (ref 135–145)
WBC # BLD AUTO: 10.1 THOU/MM3 (ref 4.8–10.8)

## 2024-12-16 PROCEDURE — 82948 REAGENT STRIP/BLOOD GLUCOSE: CPT

## 2024-12-16 PROCEDURE — 6360000002 HC RX W HCPCS

## 2024-12-16 PROCEDURE — 99233 SBSQ HOSP IP/OBS HIGH 50: CPT | Performed by: STUDENT IN AN ORGANIZED HEALTH CARE EDUCATION/TRAINING PROGRAM

## 2024-12-16 PROCEDURE — 85027 COMPLETE CBC AUTOMATED: CPT

## 2024-12-16 PROCEDURE — 85520 HEPARIN ASSAY: CPT

## 2024-12-16 PROCEDURE — 6370000000 HC RX 637 (ALT 250 FOR IP): Performed by: NURSE PRACTITIONER

## 2024-12-16 PROCEDURE — 97110 THERAPEUTIC EXERCISES: CPT

## 2024-12-16 PROCEDURE — 71045 X-RAY EXAM CHEST 1 VIEW: CPT

## 2024-12-16 PROCEDURE — 82248 BILIRUBIN DIRECT: CPT

## 2024-12-16 PROCEDURE — 99232 SBSQ HOSP IP/OBS MODERATE 35: CPT | Performed by: NURSE PRACTITIONER

## 2024-12-16 PROCEDURE — 2580000003 HC RX 258: Performed by: NURSE PRACTITIONER

## 2024-12-16 PROCEDURE — 80053 COMPREHEN METABOLIC PANEL: CPT

## 2024-12-16 PROCEDURE — 36415 COLL VENOUS BLD VENIPUNCTURE: CPT

## 2024-12-16 PROCEDURE — 2580000003 HC RX 258

## 2024-12-16 PROCEDURE — 92526 ORAL FUNCTION THERAPY: CPT

## 2024-12-16 PROCEDURE — 6370000000 HC RX 637 (ALT 250 FOR IP)

## 2024-12-16 PROCEDURE — 2060000000 HC ICU INTERMEDIATE R&B

## 2024-12-16 PROCEDURE — 97535 SELF CARE MNGMENT TRAINING: CPT

## 2024-12-16 PROCEDURE — 85730 THROMBOPLASTIN TIME PARTIAL: CPT

## 2024-12-16 RX ORDER — METOPROLOL TARTRATE 25 MG/1
25 TABLET, FILM COATED ORAL 2 TIMES DAILY
Status: DISCONTINUED | OUTPATIENT
Start: 2024-12-16 | End: 2024-12-16

## 2024-12-16 RX ORDER — METOPROLOL TARTRATE 25 MG/1
25 TABLET, FILM COATED ORAL 3 TIMES DAILY
Status: DISCONTINUED | OUTPATIENT
Start: 2024-12-16 | End: 2024-12-18

## 2024-12-16 RX ORDER — DIGOXIN 125 MCG
62.5 TABLET ORAL DAILY
Status: DISCONTINUED | OUTPATIENT
Start: 2024-12-17 | End: 2024-12-31 | Stop reason: HOSPADM

## 2024-12-16 RX ORDER — AMIODARONE HYDROCHLORIDE 200 MG/1
200 TABLET ORAL DAILY
Status: DISCONTINUED | OUTPATIENT
Start: 2024-12-16 | End: 2024-12-16

## 2024-12-16 RX ORDER — DIGOXIN 125 MCG
125 TABLET ORAL DAILY
Status: DISCONTINUED | OUTPATIENT
Start: 2024-12-16 | End: 2024-12-16

## 2024-12-16 RX ORDER — AMIODARONE HYDROCHLORIDE 200 MG/1
200 TABLET ORAL DAILY
Status: DISCONTINUED | OUTPATIENT
Start: 2024-12-17 | End: 2024-12-31 | Stop reason: HOSPADM

## 2024-12-16 RX ORDER — DIGOXIN 125 MCG
125 TABLET ORAL ONCE
Status: COMPLETED | OUTPATIENT
Start: 2024-12-16 | End: 2024-12-16

## 2024-12-16 RX ORDER — DOXYCYCLINE HYCLATE 100 MG
100 TABLET ORAL EVERY 12 HOURS SCHEDULED
Status: COMPLETED | OUTPATIENT
Start: 2024-12-16 | End: 2024-12-17

## 2024-12-16 RX ADMIN — DOXYCYCLINE HYCLATE 100 MG: 100 TABLET, COATED ORAL at 20:40

## 2024-12-16 RX ADMIN — SODIUM CHLORIDE, PRESERVATIVE FREE 10 ML: 5 INJECTION INTRAVENOUS at 09:30

## 2024-12-16 RX ADMIN — METOPROLOL TARTRATE 25 MG: 25 TABLET, FILM COATED ORAL at 14:50

## 2024-12-16 RX ADMIN — METHOCARBAMOL 1000 MG: 500 TABLET ORAL at 12:52

## 2024-12-16 RX ADMIN — POLYETHYLENE GLYCOL 3350 17 G: 17 POWDER, FOR SOLUTION ORAL at 09:30

## 2024-12-16 RX ADMIN — METHOCARBAMOL 1000 MG: 500 TABLET ORAL at 18:00

## 2024-12-16 RX ADMIN — AMIODARONE HYDROCHLORIDE 200 MG: 200 TABLET ORAL at 09:30

## 2024-12-16 RX ADMIN — METHOCARBAMOL 1000 MG: 500 TABLET ORAL at 20:40

## 2024-12-16 RX ADMIN — FAMOTIDINE 20 MG: 20 TABLET, FILM COATED ORAL at 09:30

## 2024-12-16 RX ADMIN — METOPROLOL TARTRATE 25 MG: 25 TABLET, FILM COATED ORAL at 20:40

## 2024-12-16 RX ADMIN — METHOCARBAMOL 1000 MG: 500 TABLET ORAL at 09:30

## 2024-12-16 RX ADMIN — DOXYCYCLINE HYCLATE 100 MG: 100 TABLET, COATED ORAL at 12:50

## 2024-12-16 RX ADMIN — DIGOXIN 125 MCG: 0.12 TABLET ORAL at 09:30

## 2024-12-16 RX ADMIN — WATER 1000 MG: 1 INJECTION INTRAMUSCULAR; INTRAVENOUS; SUBCUTANEOUS at 14:50

## 2024-12-16 RX ADMIN — HEPARIN SODIUM 13 UNITS/KG/HR: 10000 INJECTION, SOLUTION INTRAVENOUS at 16:26

## 2024-12-16 RX ADMIN — METOPROLOL TARTRATE 25 MG: 25 TABLET, FILM COATED ORAL at 09:30

## 2024-12-16 ASSESSMENT — PAIN SCALES - GENERAL: PAINLEVEL_OUTOF10: 0

## 2024-12-16 NOTE — PALLIATIVE CARE
Follow Up / Progress Note        Patient:   Tato Cannon  YOB: 1940  Age:  84 y.o.  Room:  [facility]  MRN:  [id number]               Plan/Follow-Up:  Attempted to see patient now with improved mental status. Patient sleeping, did not disturb. Will attempt to see patient when awake at a later time.          Electronically signed by Cliff Lassiter RN on 12/16/2024 at 3:48 PM             Palliative Care Office: [phone]

## 2024-12-17 LAB
ALBUMIN SERPL BCG-MCNC: 2.7 G/DL (ref 3.5–5.1)
ALP SERPL-CCNC: 217 U/L (ref 38–126)
ALT SERPL W/O P-5'-P-CCNC: 28 U/L (ref 11–66)
ANION GAP SERPL CALC-SCNC: 12 MEQ/L (ref 8–16)
AST SERPL-CCNC: 27 U/L (ref 5–40)
BILIRUB CONJ SERPL-MCNC: 0.8 MG/DL (ref 0.1–13.8)
BILIRUB SERPL-MCNC: 1.2 MG/DL (ref 0.3–1.2)
BUN SERPL-MCNC: 25 MG/DL (ref 7–22)
CALCIUM SERPL-MCNC: 8.7 MG/DL (ref 8.5–10.5)
CHLORIDE SERPL-SCNC: 109 MEQ/L (ref 98–111)
CO2 SERPL-SCNC: 22 MEQ/L (ref 23–33)
CREAT SERPL-MCNC: 1.1 MG/DL (ref 0.4–1.2)
DEPRECATED RDW RBC AUTO: 56.4 FL (ref 35–45)
ERYTHROCYTE [DISTWIDTH] IN BLOOD BY AUTOMATED COUNT: 17 % (ref 11.5–14.5)
GFR SERPL CREATININE-BSD FRML MDRD: 66 ML/MIN/1.73M2
GLUCOSE SERPL-MCNC: 107 MG/DL (ref 70–108)
HCT VFR BLD AUTO: 36.6 % (ref 42–52)
HEPARIN UNFRACTIONATED: 0.22 U/ML (ref 0.3–0.7)
HEPARIN UNFRACTIONATED: 0.31 U/ML (ref 0.3–0.7)
HEPARIN UNFRACTIONATED: 0.36 U/ML (ref 0.3–0.7)
HGB BLD-MCNC: 11 GM/DL (ref 14–18)
MCH RBC QN AUTO: 27.7 PG (ref 26–33)
MCHC RBC AUTO-ENTMCNC: 30.1 GM/DL (ref 32.2–35.5)
MCV RBC AUTO: 92.2 FL (ref 80–94)
PLATELET # BLD AUTO: 227 THOU/MM3 (ref 130–400)
PMV BLD AUTO: 9.8 FL (ref 9.4–12.4)
POTASSIUM SERPL-SCNC: 4.2 MEQ/L (ref 3.5–5.2)
PROT SERPL-MCNC: 6.4 G/DL (ref 6.1–8)
RBC # BLD AUTO: 3.97 MILL/MM3 (ref 4.7–6.1)
SODIUM SERPL-SCNC: 143 MEQ/L (ref 135–145)
T4 FREE SERPL-MCNC: 0.61 NG/DL (ref 0.93–1.68)
TSH SERPL DL<=0.005 MIU/L-ACNC: 8.69 UIU/ML (ref 0.4–4.2)
WBC # BLD AUTO: 12 THOU/MM3 (ref 4.8–10.8)

## 2024-12-17 PROCEDURE — 84443 ASSAY THYROID STIM HORMONE: CPT

## 2024-12-17 PROCEDURE — 84439 ASSAY OF FREE THYROXINE: CPT

## 2024-12-17 PROCEDURE — 6360000002 HC RX W HCPCS

## 2024-12-17 PROCEDURE — 99232 SBSQ HOSP IP/OBS MODERATE 35: CPT | Performed by: NURSE PRACTITIONER

## 2024-12-17 PROCEDURE — 6370000000 HC RX 637 (ALT 250 FOR IP): Performed by: NURSE PRACTITIONER

## 2024-12-17 PROCEDURE — 85027 COMPLETE CBC AUTOMATED: CPT

## 2024-12-17 PROCEDURE — 2060000000 HC ICU INTERMEDIATE R&B

## 2024-12-17 PROCEDURE — 80053 COMPREHEN METABOLIC PANEL: CPT

## 2024-12-17 PROCEDURE — 85520 HEPARIN ASSAY: CPT

## 2024-12-17 PROCEDURE — 99233 SBSQ HOSP IP/OBS HIGH 50: CPT | Performed by: STUDENT IN AN ORGANIZED HEALTH CARE EDUCATION/TRAINING PROGRAM

## 2024-12-17 PROCEDURE — 82248 BILIRUBIN DIRECT: CPT

## 2024-12-17 PROCEDURE — 97530 THERAPEUTIC ACTIVITIES: CPT

## 2024-12-17 PROCEDURE — 97110 THERAPEUTIC EXERCISES: CPT

## 2024-12-17 PROCEDURE — 6370000000 HC RX 637 (ALT 250 FOR IP)

## 2024-12-17 PROCEDURE — 36415 COLL VENOUS BLD VENIPUNCTURE: CPT

## 2024-12-17 RX ORDER — SPIRONOLACTONE 25 MG/1
25 TABLET ORAL DAILY
Status: DISCONTINUED | OUTPATIENT
Start: 2024-12-17 | End: 2024-12-31 | Stop reason: HOSPADM

## 2024-12-17 RX ADMIN — DOXYCYCLINE HYCLATE 100 MG: 100 TABLET, COATED ORAL at 20:07

## 2024-12-17 RX ADMIN — HEPARIN SODIUM 2000 UNITS: 1000 INJECTION INTRAVENOUS; SUBCUTANEOUS at 15:42

## 2024-12-17 RX ADMIN — METHOCARBAMOL 1000 MG: 500 TABLET ORAL at 08:58

## 2024-12-17 RX ADMIN — BUMETANIDE 1 MG: 0.25 INJECTION INTRAMUSCULAR; INTRAVENOUS at 09:08

## 2024-12-17 RX ADMIN — METHOCARBAMOL 1000 MG: 500 TABLET ORAL at 11:21

## 2024-12-17 RX ADMIN — SPIRONOLACTONE 25 MG: 25 TABLET ORAL at 12:01

## 2024-12-17 RX ADMIN — DIGOXIN 62.5 MCG: 0.12 TABLET ORAL at 08:58

## 2024-12-17 RX ADMIN — METHOCARBAMOL 1000 MG: 500 TABLET ORAL at 15:29

## 2024-12-17 RX ADMIN — METOPROLOL TARTRATE 25 MG: 25 TABLET, FILM COATED ORAL at 20:07

## 2024-12-17 RX ADMIN — DOXYCYCLINE HYCLATE 100 MG: 100 TABLET, COATED ORAL at 08:58

## 2024-12-17 RX ADMIN — AMIODARONE HYDROCHLORIDE 200 MG: 200 TABLET ORAL at 08:58

## 2024-12-17 RX ADMIN — HEPARIN SODIUM 2000 UNITS: 1000 INJECTION INTRAVENOUS; SUBCUTANEOUS at 01:32

## 2024-12-17 RX ADMIN — HEPARIN SODIUM 17 UNITS/KG/HR: 10000 INJECTION, SOLUTION INTRAVENOUS at 15:42

## 2024-12-17 RX ADMIN — METHOCARBAMOL 1000 MG: 500 TABLET ORAL at 20:07

## 2024-12-17 RX ADMIN — HEPARIN SODIUM 15 UNITS/KG/HR: 10000 INJECTION, SOLUTION INTRAVENOUS at 12:47

## 2024-12-17 RX ADMIN — METOPROLOL TARTRATE 25 MG: 25 TABLET, FILM COATED ORAL at 08:58

## 2024-12-17 RX ADMIN — METOPROLOL TARTRATE 25 MG: 25 TABLET, FILM COATED ORAL at 15:29

## 2024-12-17 RX ADMIN — ACETAMINOPHEN 650 MG: 325 TABLET ORAL at 23:51

## 2024-12-17 RX ADMIN — FAMOTIDINE 20 MG: 20 TABLET, FILM COATED ORAL at 09:05

## 2024-12-17 ASSESSMENT — PATIENT HEALTH QUESTIONNAIRE - PHQ9
SUM OF ALL RESPONSES TO PHQ9 QUESTIONS 1 & 2: 0
1. LITTLE INTEREST OR PLEASURE IN DOING THINGS: NOT AT ALL
SUM OF ALL RESPONSES TO PHQ QUESTIONS 1-9: 0
2. FEELING DOWN, DEPRESSED OR HOPELESS: NOT AT ALL
SUM OF ALL RESPONSES TO PHQ QUESTIONS 1-9: 0

## 2024-12-17 ASSESSMENT — LIFESTYLE VARIABLES
HOW MANY STANDARD DRINKS CONTAINING ALCOHOL DO YOU HAVE ON A TYPICAL DAY: PATIENT DOES NOT DRINK
HOW OFTEN DO YOU HAVE A DRINK CONTAINING ALCOHOL: NEVER

## 2024-12-17 ASSESSMENT — PAIN DESCRIPTION - ORIENTATION
ORIENTATION: LEFT
ORIENTATION: LEFT

## 2024-12-17 ASSESSMENT — PAIN SCALES - GENERAL
PAINLEVEL_OUTOF10: 4
PAINLEVEL_OUTOF10: 5

## 2024-12-17 ASSESSMENT — PAIN DESCRIPTION - LOCATION
LOCATION: RIB CAGE
LOCATION: RIB CAGE

## 2024-12-18 LAB
ALBUMIN SERPL BCG-MCNC: 2.7 G/DL (ref 3.5–5.1)
ALP SERPL-CCNC: 202 U/L (ref 38–126)
ALT SERPL W/O P-5'-P-CCNC: 24 U/L (ref 11–66)
ANION GAP SERPL CALC-SCNC: 13 MEQ/L (ref 8–16)
AST SERPL-CCNC: 23 U/L (ref 5–40)
BILIRUB CONJ SERPL-MCNC: 0.8 MG/DL (ref 0.1–13.8)
BILIRUB SERPL-MCNC: 1.2 MG/DL (ref 0.3–1.2)
BUN SERPL-MCNC: 26 MG/DL (ref 7–22)
CALCIUM SERPL-MCNC: 8.8 MG/DL (ref 8.5–10.5)
CHLORIDE SERPL-SCNC: 103 MEQ/L (ref 98–111)
CO2 SERPL-SCNC: 23 MEQ/L (ref 23–33)
CREAT SERPL-MCNC: 1.1 MG/DL (ref 0.4–1.2)
DEPRECATED RDW RBC AUTO: 56.1 FL (ref 35–45)
EKG Q-T INTERVAL: 326 MS
EKG QRS DURATION: 138 MS
EKG QTC CALCULATION (BAZETT): 456 MS
EKG R AXIS: -60 DEGREES
EKG T AXIS: 103 DEGREES
EKG VENTRICULAR RATE: 118 BPM
ERYTHROCYTE [DISTWIDTH] IN BLOOD BY AUTOMATED COUNT: 17.1 % (ref 11.5–14.5)
GFR SERPL CREATININE-BSD FRML MDRD: 66 ML/MIN/1.73M2
GLUCOSE SERPL-MCNC: 113 MG/DL (ref 70–108)
HCT VFR BLD AUTO: 34.1 % (ref 42–52)
HEPARIN UNFRACTIONATED: 0.37 U/ML (ref 0.3–0.7)
HEPARIN UNFRACTIONATED: 0.38 U/ML (ref 0.3–0.7)
HEPARIN UNFRACTIONATED: > 2 U/ML (ref 0.3–0.7)
HGB BLD-MCNC: 10.3 GM/DL (ref 14–18)
MAGNESIUM SERPL-MCNC: 1.7 MG/DL (ref 1.6–2.4)
MCH RBC QN AUTO: 27.6 PG (ref 26–33)
MCHC RBC AUTO-ENTMCNC: 30.2 GM/DL (ref 32.2–35.5)
MCV RBC AUTO: 91.4 FL (ref 80–94)
PHOSPHATE SERPL-MCNC: 2.6 MG/DL (ref 2.4–4.7)
PLATELET # BLD AUTO: 212 THOU/MM3 (ref 130–400)
PMV BLD AUTO: 10 FL (ref 9.4–12.4)
POTASSIUM SERPL-SCNC: 4 MEQ/L (ref 3.5–5.2)
PROT SERPL-MCNC: 6 G/DL (ref 6.1–8)
RBC # BLD AUTO: 3.73 MILL/MM3 (ref 4.7–6.1)
SODIUM SERPL-SCNC: 139 MEQ/L (ref 135–145)
WBC # BLD AUTO: 9.8 THOU/MM3 (ref 4.8–10.8)

## 2024-12-18 PROCEDURE — 6360000002 HC RX W HCPCS

## 2024-12-18 PROCEDURE — 99232 SBSQ HOSP IP/OBS MODERATE 35: CPT | Performed by: NURSE PRACTITIONER

## 2024-12-18 PROCEDURE — 2500000003 HC RX 250 WO HCPCS: Performed by: NURSE PRACTITIONER

## 2024-12-18 PROCEDURE — 85027 COMPLETE CBC AUTOMATED: CPT

## 2024-12-18 PROCEDURE — 93005 ELECTROCARDIOGRAM TRACING: CPT

## 2024-12-18 PROCEDURE — 6370000000 HC RX 637 (ALT 250 FOR IP): Performed by: NURSE PRACTITIONER

## 2024-12-18 PROCEDURE — 36415 COLL VENOUS BLD VENIPUNCTURE: CPT

## 2024-12-18 PROCEDURE — 6370000000 HC RX 637 (ALT 250 FOR IP)

## 2024-12-18 PROCEDURE — 97535 SELF CARE MNGMENT TRAINING: CPT

## 2024-12-18 PROCEDURE — 84100 ASSAY OF PHOSPHORUS: CPT

## 2024-12-18 PROCEDURE — 2060000000 HC ICU INTERMEDIATE R&B

## 2024-12-18 PROCEDURE — 6370000000 HC RX 637 (ALT 250 FOR IP): Performed by: INTERNAL MEDICINE

## 2024-12-18 PROCEDURE — 99233 SBSQ HOSP IP/OBS HIGH 50: CPT | Performed by: INTERNAL MEDICINE

## 2024-12-18 PROCEDURE — 85520 HEPARIN ASSAY: CPT

## 2024-12-18 PROCEDURE — 97110 THERAPEUTIC EXERCISES: CPT

## 2024-12-18 PROCEDURE — 93010 ELECTROCARDIOGRAM REPORT: CPT | Performed by: NUCLEAR MEDICINE

## 2024-12-18 PROCEDURE — 82248 BILIRUBIN DIRECT: CPT

## 2024-12-18 PROCEDURE — 83735 ASSAY OF MAGNESIUM: CPT

## 2024-12-18 PROCEDURE — 6360000002 HC RX W HCPCS: Performed by: NURSE PRACTITIONER

## 2024-12-18 PROCEDURE — 80053 COMPREHEN METABOLIC PANEL: CPT

## 2024-12-18 RX ORDER — LANOLIN ALCOHOL/MO/W.PET/CERES
100 CREAM (GRAM) TOPICAL DAILY
Status: DISCONTINUED | OUTPATIENT
Start: 2024-12-18 | End: 2024-12-31 | Stop reason: HOSPADM

## 2024-12-18 RX ORDER — METOPROLOL TARTRATE 50 MG
50 TABLET ORAL 2 TIMES DAILY
Status: DISCONTINUED | OUTPATIENT
Start: 2024-12-18 | End: 2024-12-19

## 2024-12-18 RX ORDER — METOPROLOL TARTRATE 25 MG/1
25 TABLET, FILM COATED ORAL ONCE
Status: COMPLETED | OUTPATIENT
Start: 2024-12-18 | End: 2024-12-18

## 2024-12-18 RX ORDER — MAGNESIUM SULFATE IN WATER 40 MG/ML
2000 INJECTION, SOLUTION INTRAVENOUS ONCE
Status: COMPLETED | OUTPATIENT
Start: 2024-12-18 | End: 2024-12-18

## 2024-12-18 RX ORDER — HALOPERIDOL 2 MG/1
2 TABLET ORAL ONCE
Status: DISCONTINUED | OUTPATIENT
Start: 2024-12-18 | End: 2024-12-18

## 2024-12-18 RX ORDER — TRAZODONE HYDROCHLORIDE 50 MG/1
50 TABLET, FILM COATED ORAL NIGHTLY
Status: DISCONTINUED | OUTPATIENT
Start: 2024-12-18 | End: 2024-12-19

## 2024-12-18 RX ORDER — VALSARTAN 40 MG/1
20 TABLET ORAL DAILY
Status: DISCONTINUED | OUTPATIENT
Start: 2024-12-19 | End: 2024-12-31 | Stop reason: HOSPADM

## 2024-12-18 RX ORDER — HALOPERIDOL 5 MG/ML
1 INJECTION INTRAMUSCULAR EVERY 6 HOURS PRN
Status: DISCONTINUED | OUTPATIENT
Start: 2024-12-18 | End: 2024-12-20 | Stop reason: DRUGHIGH

## 2024-12-18 RX ORDER — HALOPERIDOL 2 MG/1
2 TABLET ORAL ONCE
Status: COMPLETED | OUTPATIENT
Start: 2024-12-18 | End: 2024-12-18

## 2024-12-18 RX ORDER — BUMETANIDE 0.25 MG/ML
1 INJECTION, SOLUTION INTRAMUSCULAR; INTRAVENOUS 2 TIMES DAILY
Status: DISCONTINUED | OUTPATIENT
Start: 2024-12-18 | End: 2024-12-20

## 2024-12-18 RX ORDER — HALOPERIDOL 5 MG/ML
5 INJECTION INTRAMUSCULAR ONCE
Status: COMPLETED | OUTPATIENT
Start: 2024-12-18 | End: 2024-12-18

## 2024-12-18 RX ADMIN — MAGNESIUM SULFATE HEPTAHYDRATE 2000 MG: 40 INJECTION, SOLUTION INTRAVENOUS at 11:43

## 2024-12-18 RX ADMIN — LEVOTHYROXINE SODIUM 50 MCG: 0.05 TABLET ORAL at 08:35

## 2024-12-18 RX ADMIN — METHOCARBAMOL 1000 MG: 500 TABLET ORAL at 17:10

## 2024-12-18 RX ADMIN — AMIODARONE HYDROCHLORIDE 200 MG: 200 TABLET ORAL at 08:37

## 2024-12-18 RX ADMIN — METHOCARBAMOL 1000 MG: 500 TABLET ORAL at 21:00

## 2024-12-18 RX ADMIN — POLYETHYLENE GLYCOL 3350 17 G: 17 POWDER, FOR SOLUTION ORAL at 08:38

## 2024-12-18 RX ADMIN — BUMETANIDE 1 MG: 0.25 INJECTION INTRAMUSCULAR; INTRAVENOUS at 08:37

## 2024-12-18 RX ADMIN — Medication 6 MG: at 21:00

## 2024-12-18 RX ADMIN — METOPROLOL TARTRATE 25 MG: 25 TABLET, FILM COATED ORAL at 12:12

## 2024-12-18 RX ADMIN — METHOCARBAMOL 1000 MG: 500 TABLET ORAL at 08:37

## 2024-12-18 RX ADMIN — HEPARIN SODIUM 14 UNITS/KG/HR: 10000 INJECTION, SOLUTION INTRAVENOUS at 21:09

## 2024-12-18 RX ADMIN — TRAZODONE HYDROCHLORIDE 50 MG: 50 TABLET ORAL at 21:00

## 2024-12-18 RX ADMIN — METOPROLOL TARTRATE 50 MG: 25 TABLET, FILM COATED ORAL at 21:00

## 2024-12-18 RX ADMIN — BUMETANIDE 1 MG: 0.25 INJECTION INTRAMUSCULAR; INTRAVENOUS at 17:09

## 2024-12-18 RX ADMIN — FAMOTIDINE 20 MG: 20 TABLET, FILM COATED ORAL at 08:37

## 2024-12-18 RX ADMIN — METOPROLOL TARTRATE 25 MG: 25 TABLET, FILM COATED ORAL at 08:34

## 2024-12-18 RX ADMIN — HALOPERIDOL 2 MG: 2 TABLET ORAL at 01:29

## 2024-12-18 RX ADMIN — Medication 100 MG: at 17:10

## 2024-12-18 RX ADMIN — ACETAMINOPHEN 650 MG: 325 TABLET ORAL at 08:33

## 2024-12-18 RX ADMIN — SPIRONOLACTONE 25 MG: 25 TABLET ORAL at 08:37

## 2024-12-18 RX ADMIN — HEPARIN SODIUM 17 UNITS/KG/HR: 10000 INJECTION, SOLUTION INTRAVENOUS at 08:31

## 2024-12-18 RX ADMIN — METHOCARBAMOL 1000 MG: 500 TABLET ORAL at 12:13

## 2024-12-18 RX ADMIN — SODIUM CHLORIDE, PRESERVATIVE FREE 10 ML: 5 INJECTION INTRAVENOUS at 08:46

## 2024-12-18 RX ADMIN — HALOPERIDOL LACTATE 5 MG: 5 INJECTION, SOLUTION INTRAMUSCULAR at 04:23

## 2024-12-18 RX ADMIN — DIGOXIN 62.5 MCG: 0.12 TABLET ORAL at 08:35

## 2024-12-18 ASSESSMENT — PAIN DESCRIPTION - ONSET
ONSET: ON-GOING
ONSET: ON-GOING

## 2024-12-18 ASSESSMENT — PAIN DESCRIPTION - LOCATION
LOCATION: RIB CAGE

## 2024-12-18 ASSESSMENT — PAIN DESCRIPTION - DESCRIPTORS
DESCRIPTORS: PATIENT UNABLE TO DESCRIBE

## 2024-12-18 ASSESSMENT — PAIN DESCRIPTION - FREQUENCY
FREQUENCY: CONTINUOUS
FREQUENCY: CONTINUOUS

## 2024-12-18 ASSESSMENT — PAIN SCALES - GENERAL
PAINLEVEL_OUTOF10: 7
PAINLEVEL_OUTOF10: 4
PAINLEVEL_OUTOF10: 7
PAINLEVEL_OUTOF10: 7

## 2024-12-18 ASSESSMENT — PAIN - FUNCTIONAL ASSESSMENT: PAIN_FUNCTIONAL_ASSESSMENT: PREVENTS OR INTERFERES SOME ACTIVE ACTIVITIES AND ADLS

## 2024-12-18 ASSESSMENT — PAIN DESCRIPTION - ORIENTATION
ORIENTATION: RIGHT

## 2024-12-18 ASSESSMENT — PAIN DESCRIPTION - PAIN TYPE
TYPE: ACUTE PAIN
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
## 2024-12-18 NOTE — PROCEDURES
PROCEDURE NOTE  Date: 12/18/2024   Name: Tato Cannon  YOB: 1940    Procedures    12 lead EKG completed. Results handed to RN

## 2024-12-19 LAB
ALBUMIN SERPL BCG-MCNC: 2.7 G/DL (ref 3.5–5.1)
ALP SERPL-CCNC: 214 U/L (ref 38–126)
ALT SERPL W/O P-5'-P-CCNC: 24 U/L (ref 11–66)
ANION GAP SERPL CALC-SCNC: 13 MEQ/L (ref 8–16)
AST SERPL-CCNC: 22 U/L (ref 5–40)
BILIRUB CONJ SERPL-MCNC: 0.6 MG/DL (ref 0.1–13.8)
BILIRUB SERPL-MCNC: 1 MG/DL (ref 0.3–1.2)
BUN SERPL-MCNC: 28 MG/DL (ref 7–22)
CALCIUM SERPL-MCNC: 8.6 MG/DL (ref 8.5–10.5)
CHLORIDE SERPL-SCNC: 105 MEQ/L (ref 98–111)
CO2 SERPL-SCNC: 24 MEQ/L (ref 23–33)
CREAT SERPL-MCNC: 1.1 MG/DL (ref 0.4–1.2)
DEPRECATED RDW RBC AUTO: 57.9 FL (ref 35–45)
DIGOXIN SERPL-MCNC: 0.6 NG/ML (ref 0.5–2)
ERYTHROCYTE [DISTWIDTH] IN BLOOD BY AUTOMATED COUNT: 17 % (ref 11.5–14.5)
GFR SERPL CREATININE-BSD FRML MDRD: 66 ML/MIN/1.73M2
GLUCOSE SERPL-MCNC: 103 MG/DL (ref 70–108)
HCT VFR BLD AUTO: 33.7 % (ref 42–52)
HEPARIN UNFRACTIONATED: 0.15 U/ML (ref 0.3–0.7)
HEPARIN UNFRACTIONATED: 0.31 U/ML (ref 0.3–0.7)
HEPARIN UNFRACTIONATED: 0.37 U/ML (ref 0.3–0.7)
HEPARIN UNFRACTIONATED: 0.39 U/ML (ref 0.3–0.7)
HGB BLD-MCNC: 9.9 GM/DL (ref 14–18)
MAGNESIUM SERPL-MCNC: 2 MG/DL (ref 1.6–2.4)
MCH RBC QN AUTO: 27.6 PG (ref 26–33)
MCHC RBC AUTO-ENTMCNC: 29.4 GM/DL (ref 32.2–35.5)
MCV RBC AUTO: 93.9 FL (ref 80–94)
PHOSPHATE SERPL-MCNC: 2.9 MG/DL (ref 2.4–4.7)
PLATELET # BLD AUTO: 237 THOU/MM3 (ref 130–400)
PMV BLD AUTO: 10.1 FL (ref 9.4–12.4)
POTASSIUM SERPL-SCNC: 4.1 MEQ/L (ref 3.5–5.2)
POTASSIUM SERPL-SCNC: 4.2 MEQ/L (ref 3.5–5.2)
PROT SERPL-MCNC: 6.1 G/DL (ref 6.1–8)
RBC # BLD AUTO: 3.59 MILL/MM3 (ref 4.7–6.1)
SODIUM SERPL-SCNC: 142 MEQ/L (ref 135–145)
WBC # BLD AUTO: 8.4 THOU/MM3 (ref 4.8–10.8)

## 2024-12-19 PROCEDURE — 6370000000 HC RX 637 (ALT 250 FOR IP): Performed by: NURSE PRACTITIONER

## 2024-12-19 PROCEDURE — 97116 GAIT TRAINING THERAPY: CPT

## 2024-12-19 PROCEDURE — 6370000000 HC RX 637 (ALT 250 FOR IP)

## 2024-12-19 PROCEDURE — 6360000002 HC RX W HCPCS: Performed by: INTERNAL MEDICINE

## 2024-12-19 PROCEDURE — 82248 BILIRUBIN DIRECT: CPT

## 2024-12-19 PROCEDURE — 83735 ASSAY OF MAGNESIUM: CPT

## 2024-12-19 PROCEDURE — 6370000000 HC RX 637 (ALT 250 FOR IP): Performed by: STUDENT IN AN ORGANIZED HEALTH CARE EDUCATION/TRAINING PROGRAM

## 2024-12-19 PROCEDURE — 36415 COLL VENOUS BLD VENIPUNCTURE: CPT

## 2024-12-19 PROCEDURE — 80053 COMPREHEN METABOLIC PANEL: CPT

## 2024-12-19 PROCEDURE — 99232 SBSQ HOSP IP/OBS MODERATE 35: CPT | Performed by: PHYSICIAN ASSISTANT

## 2024-12-19 PROCEDURE — 2500000003 HC RX 250 WO HCPCS: Performed by: NURSE PRACTITIONER

## 2024-12-19 PROCEDURE — 94640 AIRWAY INHALATION TREATMENT: CPT

## 2024-12-19 PROCEDURE — 84100 ASSAY OF PHOSPHORUS: CPT

## 2024-12-19 PROCEDURE — 6360000002 HC RX W HCPCS

## 2024-12-19 PROCEDURE — 6370000000 HC RX 637 (ALT 250 FOR IP): Performed by: INTERNAL MEDICINE

## 2024-12-19 PROCEDURE — 99233 SBSQ HOSP IP/OBS HIGH 50: CPT | Performed by: INTERNAL MEDICINE

## 2024-12-19 PROCEDURE — 6370000000 HC RX 637 (ALT 250 FOR IP): Performed by: PHYSICIAN ASSISTANT

## 2024-12-19 PROCEDURE — 85027 COMPLETE CBC AUTOMATED: CPT

## 2024-12-19 PROCEDURE — 94761 N-INVAS EAR/PLS OXIMETRY MLT: CPT

## 2024-12-19 PROCEDURE — 2060000000 HC ICU INTERMEDIATE R&B

## 2024-12-19 PROCEDURE — 97110 THERAPEUTIC EXERCISES: CPT

## 2024-12-19 PROCEDURE — 84132 ASSAY OF SERUM POTASSIUM: CPT

## 2024-12-19 PROCEDURE — 6360000002 HC RX W HCPCS: Performed by: NURSE PRACTITIONER

## 2024-12-19 PROCEDURE — 85520 HEPARIN ASSAY: CPT

## 2024-12-19 PROCEDURE — 80162 ASSAY OF DIGOXIN TOTAL: CPT

## 2024-12-19 RX ORDER — TAMSULOSIN HYDROCHLORIDE 0.4 MG/1
0.4 CAPSULE ORAL DAILY
Status: DISCONTINUED | OUTPATIENT
Start: 2024-12-20 | End: 2024-12-31 | Stop reason: HOSPADM

## 2024-12-19 RX ORDER — METOPROLOL TARTRATE 100 MG/1
100 TABLET ORAL 2 TIMES DAILY
Status: DISCONTINUED | OUTPATIENT
Start: 2024-12-19 | End: 2024-12-25

## 2024-12-19 RX ORDER — TRAZODONE HYDROCHLORIDE 50 MG/1
50 TABLET, FILM COATED ORAL NIGHTLY
Status: DISCONTINUED | OUTPATIENT
Start: 2024-12-19 | End: 2024-12-23

## 2024-12-19 RX ADMIN — SODIUM CHLORIDE, PRESERVATIVE FREE 10 ML: 5 INJECTION INTRAVENOUS at 09:39

## 2024-12-19 RX ADMIN — HEPARIN SODIUM 2000 UNITS: 1000 INJECTION INTRAVENOUS; SUBCUTANEOUS at 05:20

## 2024-12-19 RX ADMIN — POLYETHYLENE GLYCOL 3350 17 G: 17 POWDER, FOR SOLUTION ORAL at 09:38

## 2024-12-19 RX ADMIN — HALOPERIDOL LACTATE 1 MG: 5 INJECTION, SOLUTION INTRAMUSCULAR at 02:35

## 2024-12-19 RX ADMIN — METOPROLOL TARTRATE 50 MG: 25 TABLET, FILM COATED ORAL at 09:38

## 2024-12-19 RX ADMIN — METHOCARBAMOL 1000 MG: 500 TABLET ORAL at 09:37

## 2024-12-19 RX ADMIN — METOPROLOL 100 MG: 100 TABLET ORAL at 19:57

## 2024-12-19 RX ADMIN — METHOCARBAMOL 1000 MG: 500 TABLET ORAL at 17:27

## 2024-12-19 RX ADMIN — ACETAMINOPHEN 650 MG: 325 TABLET ORAL at 11:42

## 2024-12-19 RX ADMIN — METHOCARBAMOL 1000 MG: 500 TABLET ORAL at 12:29

## 2024-12-19 RX ADMIN — SPIRONOLACTONE 25 MG: 25 TABLET ORAL at 09:37

## 2024-12-19 RX ADMIN — Medication 6 MG: at 19:57

## 2024-12-19 RX ADMIN — LEVOTHYROXINE SODIUM 50 MCG: 0.05 TABLET ORAL at 09:37

## 2024-12-19 RX ADMIN — TRAZODONE HYDROCHLORIDE 50 MG: 50 TABLET ORAL at 19:57

## 2024-12-19 RX ADMIN — BUMETANIDE 1 MG: 0.25 INJECTION INTRAMUSCULAR; INTRAVENOUS at 09:38

## 2024-12-19 RX ADMIN — METHOCARBAMOL 1000 MG: 500 TABLET ORAL at 19:57

## 2024-12-19 RX ADMIN — BUMETANIDE 1 MG: 0.25 INJECTION INTRAMUSCULAR; INTRAVENOUS at 17:26

## 2024-12-19 RX ADMIN — Medication 100 MG: at 09:38

## 2024-12-19 RX ADMIN — VALSARTAN 20 MG: 40 TABLET ORAL at 09:37

## 2024-12-19 RX ADMIN — HEPARIN SODIUM 14 UNITS/KG/HR: 10000 INJECTION, SOLUTION INTRAVENOUS at 01:38

## 2024-12-19 RX ADMIN — AMIODARONE HYDROCHLORIDE 200 MG: 200 TABLET ORAL at 09:38

## 2024-12-19 RX ADMIN — IPRATROPIUM BROMIDE AND ALBUTEROL SULFATE 1 DOSE: .5; 3 SOLUTION RESPIRATORY (INHALATION) at 10:19

## 2024-12-19 RX ADMIN — FAMOTIDINE 20 MG: 20 TABLET, FILM COATED ORAL at 09:38

## 2024-12-19 RX ADMIN — DIGOXIN 62.5 MCG: 0.12 TABLET ORAL at 09:37

## 2024-12-19 RX ADMIN — HEPARIN SODIUM 16 UNITS/KG/HR: 10000 INJECTION, SOLUTION INTRAVENOUS at 05:22

## 2024-12-19 RX ADMIN — HEPARIN SODIUM 16 UNITS/KG/HR: 10000 INJECTION, SOLUTION INTRAVENOUS at 21:30

## 2024-12-19 NOTE — PALLIATIVE CARE
Follow Up / Progress Note        Patient:   Tato Cannon  YOB: 1940  Age:  84 y.o.  Room:  [facility]  MRN:  [id number]               Plan/Follow-Up:  Discussion with family on intubation status noted yesterday. No family at bedside this morning. Patient is alert and oriented at this time, up in bed eating breakfast. Will attempt to see family and patient at bedside later today. Primary RN to call when family arrives.         Electronically signed by Cliff Lassiter RN on 12/19/2024 at 9:38 AM             Palliative Care Office: [phone]

## 2024-12-19 NOTE — RT PROTOCOL NOTE
RT Inhaler-Nebulizer Bronchodilator Protocol Note    There is a bronchodilator order in the chart from a provider indicating to follow the RT Bronchodilator Protocol and there is an “Initiate RT Inhaler-Nebulizer Bronchodilator Protocol” order as well (see protocol at bottom of note).    CXR Findings:  No results found.    The findings from the last RT Protocol Assessment were as follows:   History Pulmonary Disease: None or smoker <15 pack years  Respiratory Pattern: Dyspnea on exertion or RR 21-25 bpm  Breath Sounds: Slightly diminished and/or crackles  Cough: Strong, spontaneous, non-productive  Indication for Bronchodilator Therapy:    Bronchodilator Assessment Score: 4    Aerosolized bronchodilator medication orders have been revised according to the RT Inhaler-Nebulizer Bronchodilator Protocol below.    Respiratory Therapist to perform RT Therapy Protocol Assessment initially then follow the protocol.  Repeat RT Therapy Protocol Assessment PRN for score 0-3 or on second treatment, BID, and PRN for scores above 3.    No Indications - adjust the frequency to every 6 hours PRN wheezing or bronchospasm, if no treatments needed after 48 hours then discontinue using Per Protocol order mode.     If indication present, adjust the RT bronchodilator orders based on the Bronchodilator Assessment Score as indicated below.  Use Inhaler orders unless patient has one or more of the following: on home nebulizer, not able to hold breath for 10 seconds, is not alert and oriented, cannot activate and use MDI correctly, or respiratory rate 25 breaths per minute or more, then use the equivalent nebulizer order(s) with same Frequency and PRN reasons based on the score.  If a patient is on this medication at home then do not decrease Frequency below that used at home.    0-3 - enter or revise RT bronchodilator order(s) to equivalent RT Bronchodilator order with Frequency of every 4 hours PRN for wheezing or increased work of

## 2024-12-20 LAB
ANION GAP SERPL CALC-SCNC: 10 MEQ/L (ref 8–16)
BUN SERPL-MCNC: 27 MG/DL (ref 7–22)
CALCIUM SERPL-MCNC: 8.8 MG/DL (ref 8.5–10.5)
CHLORIDE SERPL-SCNC: 100 MEQ/L (ref 98–111)
CO2 SERPL-SCNC: 28 MEQ/L (ref 23–33)
CREAT SERPL-MCNC: 1 MG/DL (ref 0.4–1.2)
DEPRECATED RDW RBC AUTO: 56.2 FL (ref 35–45)
ERYTHROCYTE [DISTWIDTH] IN BLOOD BY AUTOMATED COUNT: 17 % (ref 11.5–14.5)
GFR SERPL CREATININE-BSD FRML MDRD: 74 ML/MIN/1.73M2
GLUCOSE SERPL-MCNC: 114 MG/DL (ref 70–108)
HCT VFR BLD AUTO: 35.1 % (ref 42–52)
HEPARIN UNFRACTIONATED: 0.39 U/ML (ref 0.3–0.7)
HGB BLD-MCNC: 10.5 GM/DL (ref 14–18)
MAGNESIUM SERPL-MCNC: 1.8 MG/DL (ref 1.6–2.4)
MCH RBC QN AUTO: 27.9 PG (ref 26–33)
MCHC RBC AUTO-ENTMCNC: 29.9 GM/DL (ref 32.2–35.5)
MCV RBC AUTO: 93.1 FL (ref 80–94)
PHOSPHATE SERPL-MCNC: 3.5 MG/DL (ref 2.4–4.7)
PLATELET # BLD AUTO: 228 THOU/MM3 (ref 130–400)
PMV BLD AUTO: 9.8 FL (ref 9.4–12.4)
POTASSIUM SERPL-SCNC: 4.1 MEQ/L (ref 3.5–5.2)
RBC # BLD AUTO: 3.77 MILL/MM3 (ref 4.7–6.1)
SODIUM SERPL-SCNC: 138 MEQ/L (ref 135–145)
WBC # BLD AUTO: 8.2 THOU/MM3 (ref 4.8–10.8)

## 2024-12-20 PROCEDURE — 36415 COLL VENOUS BLD VENIPUNCTURE: CPT

## 2024-12-20 PROCEDURE — 6360000002 HC RX W HCPCS: Performed by: INTERNAL MEDICINE

## 2024-12-20 PROCEDURE — 85027 COMPLETE CBC AUTOMATED: CPT

## 2024-12-20 PROCEDURE — 51798 US URINE CAPACITY MEASURE: CPT

## 2024-12-20 PROCEDURE — 85520 HEPARIN ASSAY: CPT

## 2024-12-20 PROCEDURE — 6370000000 HC RX 637 (ALT 250 FOR IP)

## 2024-12-20 PROCEDURE — 84100 ASSAY OF PHOSPHORUS: CPT

## 2024-12-20 PROCEDURE — 83735 ASSAY OF MAGNESIUM: CPT

## 2024-12-20 PROCEDURE — 2500000003 HC RX 250 WO HCPCS: Performed by: NURSE PRACTITIONER

## 2024-12-20 PROCEDURE — 6360000002 HC RX W HCPCS

## 2024-12-20 PROCEDURE — 6370000000 HC RX 637 (ALT 250 FOR IP): Performed by: NURSE PRACTITIONER

## 2024-12-20 PROCEDURE — 6360000002 HC RX W HCPCS: Performed by: NURSE PRACTITIONER

## 2024-12-20 PROCEDURE — 6370000000 HC RX 637 (ALT 250 FOR IP): Performed by: PHYSICIAN ASSISTANT

## 2024-12-20 PROCEDURE — 99232 SBSQ HOSP IP/OBS MODERATE 35: CPT | Performed by: PHYSICIAN ASSISTANT

## 2024-12-20 PROCEDURE — 6370000000 HC RX 637 (ALT 250 FOR IP): Performed by: INTERNAL MEDICINE

## 2024-12-20 PROCEDURE — 99233 SBSQ HOSP IP/OBS HIGH 50: CPT | Performed by: INTERNAL MEDICINE

## 2024-12-20 PROCEDURE — 80048 BASIC METABOLIC PNL TOTAL CA: CPT

## 2024-12-20 PROCEDURE — 2060000000 HC ICU INTERMEDIATE R&B

## 2024-12-20 RX ORDER — HALOPERIDOL 5 MG/ML
2 INJECTION INTRAMUSCULAR EVERY 6 HOURS PRN
Status: DISCONTINUED | OUTPATIENT
Start: 2024-12-20 | End: 2024-12-26

## 2024-12-20 RX ORDER — MAGNESIUM SULFATE IN WATER 40 MG/ML
2000 INJECTION, SOLUTION INTRAVENOUS ONCE
Status: COMPLETED | OUTPATIENT
Start: 2024-12-20 | End: 2024-12-20

## 2024-12-20 RX ORDER — QUETIAPINE FUMARATE 25 MG/1
25 TABLET, FILM COATED ORAL NIGHTLY
Status: DISCONTINUED | OUTPATIENT
Start: 2024-12-20 | End: 2024-12-23

## 2024-12-20 RX ORDER — TRAZODONE HYDROCHLORIDE 50 MG/1
50 TABLET, FILM COATED ORAL ONCE
Status: COMPLETED | OUTPATIENT
Start: 2024-12-20 | End: 2024-12-20

## 2024-12-20 RX ORDER — BUMETANIDE 1 MG/1
1 TABLET ORAL DAILY
Status: DISCONTINUED | OUTPATIENT
Start: 2024-12-21 | End: 2024-12-31 | Stop reason: HOSPADM

## 2024-12-20 RX ADMIN — SODIUM CHLORIDE, PRESERVATIVE FREE 10 ML: 5 INJECTION INTRAVENOUS at 21:46

## 2024-12-20 RX ADMIN — BUMETANIDE 1 MG: 0.25 INJECTION INTRAMUSCULAR; INTRAVENOUS at 09:10

## 2024-12-20 RX ADMIN — Medication 6 MG: at 21:40

## 2024-12-20 RX ADMIN — DIGOXIN 62.5 MCG: 0.12 TABLET ORAL at 11:27

## 2024-12-20 RX ADMIN — HALOPERIDOL LACTATE 2 MG: 5 INJECTION, SOLUTION INTRAMUSCULAR at 05:17

## 2024-12-20 RX ADMIN — SODIUM CHLORIDE, PRESERVATIVE FREE 10 ML: 5 INJECTION INTRAVENOUS at 09:21

## 2024-12-20 RX ADMIN — METHOCARBAMOL 1000 MG: 500 TABLET ORAL at 21:39

## 2024-12-20 RX ADMIN — TAMSULOSIN HYDROCHLORIDE 0.4 MG: 0.4 CAPSULE ORAL at 11:26

## 2024-12-20 RX ADMIN — TRAZODONE HYDROCHLORIDE 50 MG: 50 TABLET ORAL at 01:07

## 2024-12-20 RX ADMIN — HEPARIN SODIUM 16 UNITS/KG/HR: 10000 INJECTION, SOLUTION INTRAVENOUS at 16:49

## 2024-12-20 RX ADMIN — SPIRONOLACTONE 25 MG: 25 TABLET ORAL at 11:26

## 2024-12-20 RX ADMIN — METHOCARBAMOL 1000 MG: 500 TABLET ORAL at 11:26

## 2024-12-20 RX ADMIN — QUETIAPINE FUMARATE 25 MG: 25 TABLET ORAL at 22:31

## 2024-12-20 RX ADMIN — HALOPERIDOL LACTATE 1 MG: 5 INJECTION, SOLUTION INTRAMUSCULAR at 00:00

## 2024-12-20 RX ADMIN — AMIODARONE HYDROCHLORIDE 200 MG: 200 TABLET ORAL at 11:26

## 2024-12-20 RX ADMIN — METOPROLOL 100 MG: 100 TABLET ORAL at 11:27

## 2024-12-20 RX ADMIN — MAGNESIUM SULFATE HEPTAHYDRATE 2000 MG: 40 INJECTION, SOLUTION INTRAVENOUS at 09:22

## 2024-12-20 RX ADMIN — TRAZODONE HYDROCHLORIDE 50 MG: 50 TABLET ORAL at 21:33

## 2024-12-20 ASSESSMENT — PAIN SCALES - WONG BAKER
WONGBAKER_NUMERICALRESPONSE: NO HURT

## 2024-12-20 ASSESSMENT — PAIN SCALES - GENERAL: PAINLEVEL_OUTOF10: 0
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
## 2024-12-21 LAB
ANION GAP SERPL CALC-SCNC: 13 MEQ/L (ref 8–16)
BUN SERPL-MCNC: 30 MG/DL (ref 7–22)
CALCIUM SERPL-MCNC: 8.9 MG/DL (ref 8.5–10.5)
CHLORIDE SERPL-SCNC: 102 MEQ/L (ref 98–111)
CO2 SERPL-SCNC: 26 MEQ/L (ref 23–33)
CREAT SERPL-MCNC: 1.1 MG/DL (ref 0.4–1.2)
DEPRECATED RDW RBC AUTO: 57.2 FL (ref 35–45)
ERYTHROCYTE [DISTWIDTH] IN BLOOD BY AUTOMATED COUNT: 17.2 % (ref 11.5–14.5)
GFR SERPL CREATININE-BSD FRML MDRD: 66 ML/MIN/1.73M2
GLUCOSE SERPL-MCNC: 96 MG/DL (ref 70–108)
HCT VFR BLD AUTO: 34.6 % (ref 42–52)
HEPARIN UNFRACTIONATED: 0.39 U/ML (ref 0.3–0.7)
HGB BLD-MCNC: 10.4 GM/DL (ref 14–18)
MAGNESIUM SERPL-MCNC: 2.1 MG/DL (ref 1.6–2.4)
MCH RBC QN AUTO: 28.3 PG (ref 26–33)
MCHC RBC AUTO-ENTMCNC: 30.1 GM/DL (ref 32.2–35.5)
MCV RBC AUTO: 94.3 FL (ref 80–94)
PHOSPHATE SERPL-MCNC: 4.1 MG/DL (ref 2.4–4.7)
PLATELET # BLD AUTO: 249 THOU/MM3 (ref 130–400)
PMV BLD AUTO: 10.3 FL (ref 9.4–12.4)
POTASSIUM SERPL-SCNC: 4.5 MEQ/L (ref 3.5–5.2)
RBC # BLD AUTO: 3.67 MILL/MM3 (ref 4.7–6.1)
SODIUM SERPL-SCNC: 141 MEQ/L (ref 135–145)
WBC # BLD AUTO: 6.3 THOU/MM3 (ref 4.8–10.8)

## 2024-12-21 PROCEDURE — 51798 US URINE CAPACITY MEASURE: CPT

## 2024-12-21 PROCEDURE — 51702 INSERT TEMP BLADDER CATH: CPT

## 2024-12-21 PROCEDURE — 83735 ASSAY OF MAGNESIUM: CPT

## 2024-12-21 PROCEDURE — 84100 ASSAY OF PHOSPHORUS: CPT

## 2024-12-21 PROCEDURE — 6370000000 HC RX 637 (ALT 250 FOR IP)

## 2024-12-21 PROCEDURE — 6360000002 HC RX W HCPCS

## 2024-12-21 PROCEDURE — 85520 HEPARIN ASSAY: CPT

## 2024-12-21 PROCEDURE — 1200000003 HC TELEMETRY R&B

## 2024-12-21 PROCEDURE — 80048 BASIC METABOLIC PNL TOTAL CA: CPT

## 2024-12-21 PROCEDURE — 99233 SBSQ HOSP IP/OBS HIGH 50: CPT | Performed by: INTERNAL MEDICINE

## 2024-12-21 PROCEDURE — 99232 SBSQ HOSP IP/OBS MODERATE 35: CPT | Performed by: NURSE PRACTITIONER

## 2024-12-21 PROCEDURE — 36415 COLL VENOUS BLD VENIPUNCTURE: CPT

## 2024-12-21 PROCEDURE — 85027 COMPLETE CBC AUTOMATED: CPT

## 2024-12-21 PROCEDURE — 51701 INSERT BLADDER CATHETER: CPT

## 2024-12-21 RX ADMIN — HEPARIN SODIUM 16 UNITS/KG/HR: 10000 INJECTION, SOLUTION INTRAVENOUS at 12:10

## 2024-12-21 RX ADMIN — LEVOTHYROXINE SODIUM 50 MCG: 0.05 TABLET ORAL at 05:58

## 2024-12-21 ASSESSMENT — PAIN SCALES - WONG BAKER: WONGBAKER_NUMERICALRESPONSE: NO HURT

## 2024-12-22 LAB
ANION GAP SERPL CALC-SCNC: 16 MEQ/L (ref 8–16)
BASOPHILS ABSOLUTE: 0 THOU/MM3 (ref 0–0.1)
BASOPHILS NFR BLD AUTO: 0.4 %
BUN SERPL-MCNC: 30 MG/DL (ref 7–22)
CALCIUM SERPL-MCNC: 8.9 MG/DL (ref 8.5–10.5)
CHLORIDE SERPL-SCNC: 105 MEQ/L (ref 98–111)
CO2 SERPL-SCNC: 24 MEQ/L (ref 23–33)
CREAT SERPL-MCNC: 1.1 MG/DL (ref 0.4–1.2)
DEPRECATED RDW RBC AUTO: 56.8 FL (ref 35–45)
EOSINOPHIL NFR BLD AUTO: 1.1 %
EOSINOPHILS ABSOLUTE: 0.1 THOU/MM3 (ref 0–0.4)
ERYTHROCYTE [DISTWIDTH] IN BLOOD BY AUTOMATED COUNT: 17.5 % (ref 11.5–14.5)
GFR SERPL CREATININE-BSD FRML MDRD: 66 ML/MIN/1.73M2
GLUCOSE SERPL-MCNC: 82 MG/DL (ref 70–108)
HCT VFR BLD AUTO: 36.7 % (ref 42–52)
HEPARIN UNFRACTIONATED: 0.42 U/ML (ref 0.3–0.7)
HGB BLD-MCNC: 11.2 GM/DL (ref 14–18)
IMM GRANULOCYTES # BLD AUTO: 0.09 THOU/MM3 (ref 0–0.07)
IMM GRANULOCYTES NFR BLD AUTO: 1.1 %
LYMPHOCYTES ABSOLUTE: 1 THOU/MM3 (ref 1–4.8)
LYMPHOCYTES NFR BLD AUTO: 12.1 %
MAGNESIUM SERPL-MCNC: 2.1 MG/DL (ref 1.6–2.4)
MCH RBC QN AUTO: 27.9 PG (ref 26–33)
MCHC RBC AUTO-ENTMCNC: 30.5 GM/DL (ref 32.2–35.5)
MCV RBC AUTO: 91.5 FL (ref 80–94)
MONOCYTES ABSOLUTE: 1.2 THOU/MM3 (ref 0.4–1.3)
MONOCYTES NFR BLD AUTO: 14.5 %
NEUTROPHILS ABSOLUTE: 5.9 THOU/MM3 (ref 1.8–7.7)
NEUTROPHILS NFR BLD AUTO: 70.8 %
NRBC BLD AUTO-RTO: 0 /100 WBC
PHOSPHATE SERPL-MCNC: 3.5 MG/DL (ref 2.4–4.7)
PLATELET # BLD AUTO: 297 THOU/MM3 (ref 130–400)
PMV BLD AUTO: 9.8 FL (ref 9.4–12.4)
POTASSIUM SERPL-SCNC: 4.2 MEQ/L (ref 3.5–5.2)
RBC # BLD AUTO: 4.01 MILL/MM3 (ref 4.7–6.1)
SODIUM SERPL-SCNC: 145 MEQ/L (ref 135–145)
WBC # BLD AUTO: 8.4 THOU/MM3 (ref 4.8–10.8)

## 2024-12-22 PROCEDURE — 85025 COMPLETE CBC W/AUTO DIFF WBC: CPT

## 2024-12-22 PROCEDURE — 1200000003 HC TELEMETRY R&B

## 2024-12-22 PROCEDURE — 84100 ASSAY OF PHOSPHORUS: CPT

## 2024-12-22 PROCEDURE — 99233 SBSQ HOSP IP/OBS HIGH 50: CPT | Performed by: INTERNAL MEDICINE

## 2024-12-22 PROCEDURE — 36415 COLL VENOUS BLD VENIPUNCTURE: CPT

## 2024-12-22 PROCEDURE — 6360000002 HC RX W HCPCS

## 2024-12-22 PROCEDURE — 6370000000 HC RX 637 (ALT 250 FOR IP)

## 2024-12-22 PROCEDURE — 80048 BASIC METABOLIC PNL TOTAL CA: CPT

## 2024-12-22 PROCEDURE — 6370000000 HC RX 637 (ALT 250 FOR IP): Performed by: INTERNAL MEDICINE

## 2024-12-22 PROCEDURE — 85520 HEPARIN ASSAY: CPT

## 2024-12-22 PROCEDURE — 83735 ASSAY OF MAGNESIUM: CPT

## 2024-12-22 PROCEDURE — 51702 INSERT TEMP BLADDER CATH: CPT

## 2024-12-22 PROCEDURE — 6370000000 HC RX 637 (ALT 250 FOR IP): Performed by: NURSE PRACTITIONER

## 2024-12-22 RX ORDER — METOPROLOL TARTRATE 1 MG/ML
5 INJECTION, SOLUTION INTRAVENOUS EVERY 6 HOURS PRN
Status: DISCONTINUED | OUTPATIENT
Start: 2024-12-22 | End: 2024-12-31 | Stop reason: HOSPADM

## 2024-12-22 RX ORDER — BUMETANIDE 0.25 MG/ML
1 INJECTION, SOLUTION INTRAMUSCULAR; INTRAVENOUS DAILY
Status: DISCONTINUED | OUTPATIENT
Start: 2024-12-22 | End: 2024-12-23

## 2024-12-22 RX ADMIN — ACETAMINOPHEN 650 MG: 325 TABLET ORAL at 22:01

## 2024-12-22 RX ADMIN — HEPARIN SODIUM 16 UNITS/KG/HR: 10000 INJECTION, SOLUTION INTRAVENOUS at 07:23

## 2024-12-22 RX ADMIN — METHOCARBAMOL 1000 MG: 500 TABLET ORAL at 19:09

## 2024-12-22 RX ADMIN — QUETIAPINE FUMARATE 25 MG: 25 TABLET ORAL at 21:56

## 2024-12-22 RX ADMIN — BUMETANIDE 1 MG: 0.25 INJECTION INTRAMUSCULAR; INTRAVENOUS at 11:05

## 2024-12-22 RX ADMIN — TRAZODONE HYDROCHLORIDE 50 MG: 50 TABLET ORAL at 21:56

## 2024-12-22 RX ADMIN — Medication 6 MG: at 21:56

## 2024-12-22 ASSESSMENT — PAIN SCALES - GENERAL
PAINLEVEL_OUTOF10: 2
PAINLEVEL_OUTOF10: 0
PAINLEVEL_OUTOF10: 2

## 2024-12-23 PROBLEM — R41.0 ACUTE DELIRIUM: Status: ACTIVE | Noted: 2024-12-23

## 2024-12-23 LAB
ANION GAP SERPL CALC-SCNC: 10 MEQ/L (ref 8–16)
BASOPHILS ABSOLUTE: 0 THOU/MM3 (ref 0–0.1)
BASOPHILS NFR BLD AUTO: 0.6 %
BUN SERPL-MCNC: 21 MG/DL (ref 7–22)
CALCIUM SERPL-MCNC: 8.6 MG/DL (ref 8.5–10.5)
CHLORIDE SERPL-SCNC: 101 MEQ/L (ref 98–111)
CO2 SERPL-SCNC: 30 MEQ/L (ref 23–33)
CREAT SERPL-MCNC: 0.9 MG/DL (ref 0.4–1.2)
DEPRECATED RDW RBC AUTO: 58.4 FL (ref 35–45)
EOSINOPHIL NFR BLD AUTO: 1.9 %
EOSINOPHILS ABSOLUTE: 0.1 THOU/MM3 (ref 0–0.4)
ERYTHROCYTE [DISTWIDTH] IN BLOOD BY AUTOMATED COUNT: 17.3 % (ref 11.5–14.5)
GFR SERPL CREATININE-BSD FRML MDRD: 84 ML/MIN/1.73M2
GLUCOSE SERPL-MCNC: 78 MG/DL (ref 70–108)
HCT VFR BLD AUTO: 36.8 % (ref 42–52)
HEPARIN UNFRACTIONATED: 0.44 U/ML (ref 0.3–0.7)
HGB BLD-MCNC: 10.9 GM/DL (ref 14–18)
IMM GRANULOCYTES # BLD AUTO: 0.04 THOU/MM3 (ref 0–0.07)
IMM GRANULOCYTES NFR BLD AUTO: 0.5 %
LYMPHOCYTES ABSOLUTE: 0.9 THOU/MM3 (ref 1–4.8)
LYMPHOCYTES NFR BLD AUTO: 11.9 %
MAGNESIUM SERPL-MCNC: 1.9 MG/DL (ref 1.6–2.4)
MCH RBC QN AUTO: 28.3 PG (ref 26–33)
MCHC RBC AUTO-ENTMCNC: 29.6 GM/DL (ref 32.2–35.5)
MCV RBC AUTO: 95.6 FL (ref 80–94)
MONOCYTES ABSOLUTE: 1.2 THOU/MM3 (ref 0.4–1.3)
MONOCYTES NFR BLD AUTO: 15.5 %
NEUTROPHILS ABSOLUTE: 5.4 THOU/MM3 (ref 1.8–7.7)
NEUTROPHILS NFR BLD AUTO: 69.6 %
NRBC BLD AUTO-RTO: 0 /100 WBC
PHOSPHATE SERPL-MCNC: 3.1 MG/DL (ref 2.4–4.7)
PLATELET # BLD AUTO: 286 THOU/MM3 (ref 130–400)
PMV BLD AUTO: 9.2 FL (ref 9.4–12.4)
POTASSIUM SERPL-SCNC: 3.6 MEQ/L (ref 3.5–5.2)
RBC # BLD AUTO: 3.85 MILL/MM3 (ref 4.7–6.1)
SODIUM SERPL-SCNC: 141 MEQ/L (ref 135–145)
WBC # BLD AUTO: 7.8 THOU/MM3 (ref 4.8–10.8)

## 2024-12-23 PROCEDURE — 6370000000 HC RX 637 (ALT 250 FOR IP)

## 2024-12-23 PROCEDURE — 83735 ASSAY OF MAGNESIUM: CPT

## 2024-12-23 PROCEDURE — 6370000000 HC RX 637 (ALT 250 FOR IP): Performed by: NURSE PRACTITIONER

## 2024-12-23 PROCEDURE — 85025 COMPLETE CBC W/AUTO DIFF WBC: CPT

## 2024-12-23 PROCEDURE — 6360000002 HC RX W HCPCS

## 2024-12-23 PROCEDURE — 6360000002 HC RX W HCPCS: Performed by: INTERNAL MEDICINE

## 2024-12-23 PROCEDURE — 80048 BASIC METABOLIC PNL TOTAL CA: CPT

## 2024-12-23 PROCEDURE — 2500000003 HC RX 250 WO HCPCS: Performed by: NURSE PRACTITIONER

## 2024-12-23 PROCEDURE — 85520 HEPARIN ASSAY: CPT

## 2024-12-23 PROCEDURE — 90792 PSYCH DIAG EVAL W/MED SRVCS: CPT | Performed by: PSYCHIATRY & NEUROLOGY

## 2024-12-23 PROCEDURE — 84100 ASSAY OF PHOSPHORUS: CPT

## 2024-12-23 PROCEDURE — 1200000003 HC TELEMETRY R&B

## 2024-12-23 PROCEDURE — 36415 COLL VENOUS BLD VENIPUNCTURE: CPT

## 2024-12-23 PROCEDURE — 6360000002 HC RX W HCPCS: Performed by: NURSE PRACTITIONER

## 2024-12-23 PROCEDURE — 99233 SBSQ HOSP IP/OBS HIGH 50: CPT | Performed by: INTERNAL MEDICINE

## 2024-12-23 RX ORDER — POTASSIUM CHLORIDE 1500 MG/1
40 TABLET, EXTENDED RELEASE ORAL ONCE
Status: DISCONTINUED | OUTPATIENT
Start: 2024-12-23 | End: 2024-12-23

## 2024-12-23 RX ORDER — BUMETANIDE 0.25 MG/ML
0.5 INJECTION, SOLUTION INTRAMUSCULAR; INTRAVENOUS 2 TIMES DAILY
Status: DISCONTINUED | OUTPATIENT
Start: 2024-12-23 | End: 2024-12-23

## 2024-12-23 RX ORDER — QUETIAPINE FUMARATE 25 MG/1
25 TABLET, FILM COATED ORAL EVERY MORNING
Status: DISCONTINUED | OUTPATIENT
Start: 2024-12-24 | End: 2024-12-31 | Stop reason: HOSPADM

## 2024-12-23 RX ORDER — RIVASTIGMINE 4.6 MG/24H
1 PATCH, EXTENDED RELEASE TRANSDERMAL DAILY
Status: DISCONTINUED | OUTPATIENT
Start: 2024-12-23 | End: 2024-12-31 | Stop reason: HOSPADM

## 2024-12-23 RX ORDER — POTASSIUM CHLORIDE 7.45 MG/ML
10 INJECTION INTRAVENOUS
Status: COMPLETED | OUTPATIENT
Start: 2024-12-23 | End: 2024-12-23

## 2024-12-23 RX ORDER — MAGNESIUM SULFATE 1 G/100ML
1000 INJECTION INTRAVENOUS ONCE
Status: COMPLETED | OUTPATIENT
Start: 2024-12-23 | End: 2024-12-23

## 2024-12-23 RX ORDER — BUMETANIDE 0.25 MG/ML
1 INJECTION, SOLUTION INTRAMUSCULAR; INTRAVENOUS 2 TIMES DAILY
Status: DISCONTINUED | OUTPATIENT
Start: 2024-12-24 | End: 2024-12-31 | Stop reason: HOSPADM

## 2024-12-23 RX ADMIN — BUMETANIDE 0.5 MG: 0.25 INJECTION INTRAMUSCULAR; INTRAVENOUS at 17:13

## 2024-12-23 RX ADMIN — METHOCARBAMOL 1000 MG: 500 TABLET ORAL at 21:06

## 2024-12-23 RX ADMIN — MAGNESIUM SULFATE HEPTAHYDRATE 1000 MG: 1 INJECTION, SOLUTION INTRAVENOUS at 12:43

## 2024-12-23 RX ADMIN — SODIUM CHLORIDE, PRESERVATIVE FREE 10 ML: 5 INJECTION INTRAVENOUS at 21:06

## 2024-12-23 RX ADMIN — POTASSIUM CHLORIDE 10 MEQ: 7.46 INJECTION, SOLUTION INTRAVENOUS at 13:21

## 2024-12-23 RX ADMIN — POTASSIUM CHLORIDE 10 MEQ: 7.46 INJECTION, SOLUTION INTRAVENOUS at 14:20

## 2024-12-23 RX ADMIN — POTASSIUM CHLORIDE 10 MEQ: 7.46 INJECTION, SOLUTION INTRAVENOUS at 12:24

## 2024-12-23 RX ADMIN — HEPARIN SODIUM 16 UNITS/KG/HR: 10000 INJECTION, SOLUTION INTRAVENOUS at 20:32

## 2024-12-23 RX ADMIN — LEVOTHYROXINE SODIUM 50 MCG: 0.05 TABLET ORAL at 06:29

## 2024-12-23 RX ADMIN — HEPARIN SODIUM 16 UNITS/KG/HR: 10000 INJECTION, SOLUTION INTRAVENOUS at 02:50

## 2024-12-23 RX ADMIN — POTASSIUM CHLORIDE 10 MEQ: 7.46 INJECTION, SOLUTION INTRAVENOUS at 16:14

## 2024-12-23 RX ADMIN — BUMETANIDE 0.5 MG: 0.25 INJECTION INTRAMUSCULAR; INTRAVENOUS at 12:19

## 2024-12-23 ASSESSMENT — PAIN SCALES - WONG BAKER
WONGBAKER_NUMERICALRESPONSE: NO HURT
WONGBAKER_NUMERICALRESPONSE: NO HURT

## 2024-12-23 ASSESSMENT — PAIN SCALES - GENERAL: PAINLEVEL_OUTOF10: 0

## 2024-12-24 LAB
ANION GAP SERPL CALC-SCNC: 11 MEQ/L (ref 8–16)
BASOPHILS ABSOLUTE: 0 THOU/MM3 (ref 0–0.1)
BASOPHILS NFR BLD AUTO: 0.4 %
BUN SERPL-MCNC: 16 MG/DL (ref 7–22)
CALCIUM SERPL-MCNC: 8.8 MG/DL (ref 8.5–10.5)
CHLORIDE SERPL-SCNC: 100 MEQ/L (ref 98–111)
CO2 SERPL-SCNC: 28 MEQ/L (ref 23–33)
CREAT SERPL-MCNC: 0.9 MG/DL (ref 0.4–1.2)
DEPRECATED RDW RBC AUTO: 55.5 FL (ref 35–45)
EKG Q-T INTERVAL: 378 MS
EKG QRS DURATION: 122 MS
EKG QTC CALCULATION (BAZETT): 527 MS
EKG R AXIS: -60 DEGREES
EKG T AXIS: -85 DEGREES
EKG VENTRICULAR RATE: 117 BPM
EOSINOPHIL NFR BLD AUTO: 1.4 %
EOSINOPHILS ABSOLUTE: 0.1 THOU/MM3 (ref 0–0.4)
ERYTHROCYTE [DISTWIDTH] IN BLOOD BY AUTOMATED COUNT: 18 % (ref 11.5–14.5)
GFR SERPL CREATININE-BSD FRML MDRD: 84 ML/MIN/1.73M2
GLUCOSE SERPL-MCNC: 107 MG/DL (ref 70–108)
HCT VFR BLD AUTO: 38 % (ref 42–52)
HEPARIN UNFRACTIONATED: 0.48 U/ML (ref 0.3–0.7)
HGB BLD-MCNC: 11.7 GM/DL (ref 14–18)
IMM GRANULOCYTES # BLD AUTO: 0.06 THOU/MM3 (ref 0–0.07)
IMM GRANULOCYTES NFR BLD AUTO: 0.7 %
LYMPHOCYTES ABSOLUTE: 0.8 THOU/MM3 (ref 1–4.8)
LYMPHOCYTES NFR BLD AUTO: 9.8 %
MAGNESIUM SERPL-MCNC: 1.9 MG/DL (ref 1.6–2.4)
MCH RBC QN AUTO: 27.9 PG (ref 26–33)
MCHC RBC AUTO-ENTMCNC: 30.8 GM/DL (ref 32.2–35.5)
MCV RBC AUTO: 90.7 FL (ref 80–94)
MONOCYTES ABSOLUTE: 1.1 THOU/MM3 (ref 0.4–1.3)
MONOCYTES NFR BLD AUTO: 13.3 %
NEUTROPHILS ABSOLUTE: 6 THOU/MM3 (ref 1.8–7.7)
NEUTROPHILS NFR BLD AUTO: 74.4 %
NRBC BLD AUTO-RTO: 0 /100 WBC
PHOSPHATE SERPL-MCNC: 2.9 MG/DL (ref 2.4–4.7)
PLATELET # BLD AUTO: 336 THOU/MM3 (ref 130–400)
PMV BLD AUTO: 9.1 FL (ref 9.4–12.4)
POTASSIUM SERPL-SCNC: 4.1 MEQ/L (ref 3.5–5.2)
RBC # BLD AUTO: 4.19 MILL/MM3 (ref 4.7–6.1)
SODIUM SERPL-SCNC: 139 MEQ/L (ref 135–145)
WBC # BLD AUTO: 8.1 THOU/MM3 (ref 4.8–10.8)

## 2024-12-24 PROCEDURE — 93010 ELECTROCARDIOGRAM REPORT: CPT | Performed by: INTERNAL MEDICINE

## 2024-12-24 PROCEDURE — 6370000000 HC RX 637 (ALT 250 FOR IP)

## 2024-12-24 PROCEDURE — 85520 HEPARIN ASSAY: CPT

## 2024-12-24 PROCEDURE — 1200000003 HC TELEMETRY R&B

## 2024-12-24 PROCEDURE — 93005 ELECTROCARDIOGRAM TRACING: CPT | Performed by: INTERNAL MEDICINE

## 2024-12-24 PROCEDURE — 36415 COLL VENOUS BLD VENIPUNCTURE: CPT

## 2024-12-24 PROCEDURE — 97535 SELF CARE MNGMENT TRAINING: CPT

## 2024-12-24 PROCEDURE — 6370000000 HC RX 637 (ALT 250 FOR IP): Performed by: INTERNAL MEDICINE

## 2024-12-24 PROCEDURE — 6360000002 HC RX W HCPCS

## 2024-12-24 PROCEDURE — 6370000000 HC RX 637 (ALT 250 FOR IP): Performed by: NURSE PRACTITIONER

## 2024-12-24 PROCEDURE — 2500000003 HC RX 250 WO HCPCS: Performed by: NURSE PRACTITIONER

## 2024-12-24 PROCEDURE — 80048 BASIC METABOLIC PNL TOTAL CA: CPT

## 2024-12-24 PROCEDURE — 85025 COMPLETE CBC W/AUTO DIFF WBC: CPT

## 2024-12-24 PROCEDURE — 99232 SBSQ HOSP IP/OBS MODERATE 35: CPT | Performed by: INTERNAL MEDICINE

## 2024-12-24 PROCEDURE — 84100 ASSAY OF PHOSPHORUS: CPT

## 2024-12-24 PROCEDURE — 2500000003 HC RX 250 WO HCPCS

## 2024-12-24 PROCEDURE — 83735 ASSAY OF MAGNESIUM: CPT

## 2024-12-24 RX ORDER — METOPROLOL TARTRATE 1 MG/ML
2.5 INJECTION, SOLUTION INTRAVENOUS ONCE
Status: COMPLETED | OUTPATIENT
Start: 2024-12-24 | End: 2024-12-24

## 2024-12-24 RX ADMIN — DIGOXIN 62.5 MCG: 0.12 TABLET ORAL at 09:37

## 2024-12-24 RX ADMIN — FAMOTIDINE 20 MG: 20 TABLET, FILM COATED ORAL at 09:37

## 2024-12-24 RX ADMIN — TAMSULOSIN HYDROCHLORIDE 0.4 MG: 0.4 CAPSULE ORAL at 09:39

## 2024-12-24 RX ADMIN — BUMETANIDE 1 MG: 0.25 INJECTION INTRAMUSCULAR; INTRAVENOUS at 18:15

## 2024-12-24 RX ADMIN — ACETAMINOPHEN 650 MG: 325 TABLET ORAL at 13:03

## 2024-12-24 RX ADMIN — METHOCARBAMOL 1000 MG: 500 TABLET ORAL at 20:39

## 2024-12-24 RX ADMIN — POLYETHYLENE GLYCOL 3350 17 G: 17 POWDER, FOR SOLUTION ORAL at 09:40

## 2024-12-24 RX ADMIN — BUMETANIDE 1 MG: 0.25 INJECTION INTRAMUSCULAR; INTRAVENOUS at 09:45

## 2024-12-24 RX ADMIN — METHOCARBAMOL 1000 MG: 500 TABLET ORAL at 09:37

## 2024-12-24 RX ADMIN — SODIUM CHLORIDE, PRESERVATIVE FREE 10 ML: 5 INJECTION INTRAVENOUS at 20:39

## 2024-12-24 RX ADMIN — METHOCARBAMOL 1000 MG: 500 TABLET ORAL at 13:03

## 2024-12-24 RX ADMIN — Medication 100 MG: at 09:39

## 2024-12-24 RX ADMIN — AMIODARONE HYDROCHLORIDE 200 MG: 200 TABLET ORAL at 09:36

## 2024-12-24 RX ADMIN — SODIUM CHLORIDE, PRESERVATIVE FREE 10 ML: 5 INJECTION INTRAVENOUS at 09:45

## 2024-12-24 RX ADMIN — LEVOTHYROXINE SODIUM 50 MCG: 0.05 TABLET ORAL at 06:38

## 2024-12-24 RX ADMIN — METHOCARBAMOL 1000 MG: 500 TABLET ORAL at 18:15

## 2024-12-24 RX ADMIN — METOPROLOL TARTRATE 2.5 MG: 5 INJECTION INTRAVENOUS at 06:43

## 2024-12-24 RX ADMIN — QUETIAPINE FUMARATE 25 MG: 25 TABLET ORAL at 09:39

## 2024-12-24 RX ADMIN — ACETAMINOPHEN 650 MG: 325 TABLET ORAL at 18:16

## 2024-12-24 RX ADMIN — HEPARIN SODIUM 16 UNITS/KG/HR: 10000 INJECTION, SOLUTION INTRAVENOUS at 14:23

## 2024-12-24 RX ADMIN — ACETAMINOPHEN 650 MG: 325 TABLET ORAL at 06:50

## 2024-12-24 ASSESSMENT — PAIN DESCRIPTION - LOCATION
LOCATION: GENERALIZED
LOCATION: GENERALIZED

## 2024-12-24 ASSESSMENT — PAIN SCALES - GENERAL
PAINLEVEL_OUTOF10: 3
PAINLEVEL_OUTOF10: 3
PAINLEVEL_OUTOF10: 0

## 2024-12-24 ASSESSMENT — PAIN DESCRIPTION - DESCRIPTORS
DESCRIPTORS: ACHING
DESCRIPTORS: ACHING

## 2024-12-24 ASSESSMENT — PAIN DESCRIPTION - ORIENTATION: ORIENTATION: LEFT

## 2024-12-24 ASSESSMENT — PAIN - FUNCTIONAL ASSESSMENT: PAIN_FUNCTIONAL_ASSESSMENT: ACTIVITIES ARE NOT PREVENTED

## 2024-12-24 NOTE — PROCEDURES
PROCEDURE NOTE  Date: 12/24/2024   Name: Tato Cannon  YOB: 1940    Procedures    12 lead EKG completed. Results handed to Rosa HANNAH

## 2024-12-24 NOTE — CONSULTS
Department of Psychiatry   Psychiatric Assessment      Thank you very much for allowing us to participate in the care of this patient.      Reason for Consult: Agitation    HISTORY OF PRESENT ILLNESS:      Patient is an 84-year-old male with no significant psychiatric history admitted for CHF exacerbation.  Concerns that he has been dealing with worsening delirium and agitation.  Patient is unable to communicate much at this time.  He is not fully oriented to time place person or situation.  Staff indicated that he has been agitated on and off here and requiring emergency medications.  Staff also mentioned that he worked for second shift for 30 years and usually sleeps during the day.    PSYCHIATRIC HISTORY:  Per records no significant psychiatric history other than major neurocognitive decline.  No records of inpatient psychiatric hospitalizations        Social History:    Per records patient is from [facility] but we do not have any further information family at this time    SUBSTANCE USE HISTORY: No concerns for substance use issues    Family Medical and Psychiatric History:       History reviewed. No pertinent family history.  Prior to Admission medications    Medication Sig Start Date End Date Taking? Authorizing Provider   apixaban (ELIQUIS) 5 MG TABS tablet Take 1 tablet by mouth daily   Yes ProviderDanelle MD   levothyroxine (SYNTHROID) 25 MCG tablet Take 1 tablet by mouth Daily 11/27/24  Yes ProviderDanelle MD   potassium chloride (KLOR-CON M) 20 MEQ extended release tablet Take 1 tablet by mouth 2 times daily  Patient taking differently: Take 1 tablet by mouth daily 6/25/24  Yes Shun Simon MD   valsartan (DIOVAN) 40 MG tablet Take 1 tablet by mouth daily 3/19/24  Yes Elizabeth Hussein, APRN - CNP   metoprolol succinate (TOPROL XL) 100 MG extended release tablet Take 1 tablet by mouth daily 3/19/24  Yes Elizabeth Hussein APRN - CNP   bumetanide (BUMEX) 0.5 MG tablet Take 1 tablet by mouth

## 2024-12-25 LAB
ANION GAP SERPL CALC-SCNC: 10 MEQ/L (ref 8–16)
BASOPHILS ABSOLUTE: 0 THOU/MM3 (ref 0–0.1)
BASOPHILS NFR BLD AUTO: 0.5 %
BUN SERPL-MCNC: 13 MG/DL (ref 7–22)
CALCIUM SERPL-MCNC: 8.7 MG/DL (ref 8.5–10.5)
CHLORIDE SERPL-SCNC: 98 MEQ/L (ref 98–111)
CO2 SERPL-SCNC: 29 MEQ/L (ref 23–33)
CREAT SERPL-MCNC: 0.9 MG/DL (ref 0.4–1.2)
DEPRECATED RDW RBC AUTO: 56.6 FL (ref 35–45)
EKG Q-T INTERVAL: 284 MS
EKG QRS DURATION: 100 MS
EKG QTC CALCULATION (BAZETT): 394 MS
EKG R AXIS: -59 DEGREES
EKG T AXIS: 139 DEGREES
EKG VENTRICULAR RATE: 116 BPM
EOSINOPHIL NFR BLD AUTO: 2.3 %
EOSINOPHILS ABSOLUTE: 0.1 THOU/MM3 (ref 0–0.4)
ERYTHROCYTE [DISTWIDTH] IN BLOOD BY AUTOMATED COUNT: 17.9 % (ref 11.5–14.5)
GFR SERPL CREATININE-BSD FRML MDRD: 84 ML/MIN/1.73M2
GLUCOSE SERPL-MCNC: 83 MG/DL (ref 70–108)
HCT VFR BLD AUTO: 39.1 % (ref 42–52)
HEPARIN UNFRACTIONATED: 0.58 U/ML (ref 0.3–0.7)
HGB BLD-MCNC: 11.9 GM/DL (ref 14–18)
IMM GRANULOCYTES # BLD AUTO: 0.04 THOU/MM3 (ref 0–0.07)
IMM GRANULOCYTES NFR BLD AUTO: 0.7 %
LYMPHOCYTES ABSOLUTE: 0.8 THOU/MM3 (ref 1–4.8)
LYMPHOCYTES NFR BLD AUTO: 13.6 %
MAGNESIUM SERPL-MCNC: 1.8 MG/DL (ref 1.6–2.4)
MCH RBC QN AUTO: 27.7 PG (ref 26–33)
MCHC RBC AUTO-ENTMCNC: 30.4 GM/DL (ref 32.2–35.5)
MCV RBC AUTO: 90.9 FL (ref 80–94)
MONOCYTES ABSOLUTE: 0.8 THOU/MM3 (ref 0.4–1.3)
MONOCYTES NFR BLD AUTO: 12.9 %
NEUTROPHILS ABSOLUTE: 4.2 THOU/MM3 (ref 1.8–7.7)
NEUTROPHILS NFR BLD AUTO: 70 %
NRBC BLD AUTO-RTO: 0 /100 WBC
PHOSPHATE SERPL-MCNC: 2.8 MG/DL (ref 2.4–4.7)
PLATELET # BLD AUTO: 317 THOU/MM3 (ref 130–400)
PMV BLD AUTO: 9 FL (ref 9.4–12.4)
POTASSIUM SERPL-SCNC: 3.9 MEQ/L (ref 3.5–5.2)
RBC # BLD AUTO: 4.3 MILL/MM3 (ref 4.7–6.1)
SODIUM SERPL-SCNC: 137 MEQ/L (ref 135–145)
WBC # BLD AUTO: 6 THOU/MM3 (ref 4.8–10.8)

## 2024-12-25 PROCEDURE — 93005 ELECTROCARDIOGRAM TRACING: CPT | Performed by: INTERNAL MEDICINE

## 2024-12-25 PROCEDURE — 6370000000 HC RX 637 (ALT 250 FOR IP): Performed by: NURSE PRACTITIONER

## 2024-12-25 PROCEDURE — 93010 ELECTROCARDIOGRAM REPORT: CPT | Performed by: INTERNAL MEDICINE

## 2024-12-25 PROCEDURE — 6360000002 HC RX W HCPCS

## 2024-12-25 PROCEDURE — 85520 HEPARIN ASSAY: CPT

## 2024-12-25 PROCEDURE — 1200000003 HC TELEMETRY R&B

## 2024-12-25 PROCEDURE — 6370000000 HC RX 637 (ALT 250 FOR IP)

## 2024-12-25 PROCEDURE — 6370000000 HC RX 637 (ALT 250 FOR IP): Performed by: INTERNAL MEDICINE

## 2024-12-25 PROCEDURE — 2500000003 HC RX 250 WO HCPCS: Performed by: NURSE PRACTITIONER

## 2024-12-25 PROCEDURE — 84100 ASSAY OF PHOSPHORUS: CPT

## 2024-12-25 PROCEDURE — 36415 COLL VENOUS BLD VENIPUNCTURE: CPT

## 2024-12-25 PROCEDURE — 99232 SBSQ HOSP IP/OBS MODERATE 35: CPT | Performed by: INTERNAL MEDICINE

## 2024-12-25 PROCEDURE — 80048 BASIC METABOLIC PNL TOTAL CA: CPT

## 2024-12-25 PROCEDURE — 6360000002 HC RX W HCPCS: Performed by: INTERNAL MEDICINE

## 2024-12-25 PROCEDURE — 85025 COMPLETE CBC W/AUTO DIFF WBC: CPT

## 2024-12-25 PROCEDURE — 83735 ASSAY OF MAGNESIUM: CPT

## 2024-12-25 PROCEDURE — 2500000003 HC RX 250 WO HCPCS

## 2024-12-25 RX ORDER — MAGNESIUM SULFATE 1 G/100ML
1000 INJECTION INTRAVENOUS ONCE
Status: COMPLETED | OUTPATIENT
Start: 2024-12-25 | End: 2024-12-25

## 2024-12-25 RX ORDER — METOPROLOL TARTRATE 50 MG
50 TABLET ORAL 2 TIMES DAILY
Status: DISCONTINUED | OUTPATIENT
Start: 2024-12-25 | End: 2024-12-31 | Stop reason: HOSPADM

## 2024-12-25 RX ORDER — METOPROLOL TARTRATE 50 MG
50 TABLET ORAL 2 TIMES DAILY
Status: DISCONTINUED | OUTPATIENT
Start: 2024-12-25 | End: 2024-12-25

## 2024-12-25 RX ADMIN — METOPROLOL TARTRATE 50 MG: 50 TABLET, FILM COATED ORAL at 13:33

## 2024-12-25 RX ADMIN — ACETAMINOPHEN 650 MG: 325 TABLET ORAL at 22:51

## 2024-12-25 RX ADMIN — Medication 100 MG: at 08:30

## 2024-12-25 RX ADMIN — LEVOTHYROXINE SODIUM 50 MCG: 0.05 TABLET ORAL at 06:12

## 2024-12-25 RX ADMIN — QUETIAPINE FUMARATE 25 MG: 25 TABLET ORAL at 08:30

## 2024-12-25 RX ADMIN — METHOCARBAMOL 1000 MG: 500 TABLET ORAL at 21:10

## 2024-12-25 RX ADMIN — MAGNESIUM SULFATE HEPTAHYDRATE 1000 MG: 1 INJECTION, SOLUTION INTRAVENOUS at 10:54

## 2024-12-25 RX ADMIN — BUMETANIDE 1 MG: 0.25 INJECTION INTRAMUSCULAR; INTRAVENOUS at 08:34

## 2024-12-25 RX ADMIN — BUMETANIDE 1 MG: 0.25 INJECTION INTRAMUSCULAR; INTRAVENOUS at 17:36

## 2024-12-25 RX ADMIN — METOPROLOL TARTRATE 5 MG: 5 INJECTION INTRAVENOUS at 09:47

## 2024-12-25 RX ADMIN — METHOCARBAMOL 1000 MG: 500 TABLET ORAL at 13:33

## 2024-12-25 RX ADMIN — HEPARIN SODIUM 16 UNITS/KG/HR: 10000 INJECTION, SOLUTION INTRAVENOUS at 06:55

## 2024-12-25 RX ADMIN — METHOCARBAMOL 1000 MG: 500 TABLET ORAL at 17:36

## 2024-12-25 RX ADMIN — FAMOTIDINE 20 MG: 20 TABLET, FILM COATED ORAL at 08:30

## 2024-12-25 RX ADMIN — DIGOXIN 62.5 MCG: 0.12 TABLET ORAL at 08:30

## 2024-12-25 RX ADMIN — METHOCARBAMOL 1000 MG: 500 TABLET ORAL at 08:30

## 2024-12-25 RX ADMIN — AMIODARONE HYDROCHLORIDE 200 MG: 200 TABLET ORAL at 08:30

## 2024-12-25 RX ADMIN — SODIUM CHLORIDE, PRESERVATIVE FREE 10 ML: 5 INJECTION INTRAVENOUS at 08:30

## 2024-12-25 RX ADMIN — SPIRONOLACTONE 25 MG: 25 TABLET ORAL at 08:30

## 2024-12-25 RX ADMIN — TAMSULOSIN HYDROCHLORIDE 0.4 MG: 0.4 CAPSULE ORAL at 08:30

## 2024-12-25 ASSESSMENT — PAIN DESCRIPTION - LOCATION: LOCATION: GENERALIZED

## 2024-12-25 ASSESSMENT — PAIN DESCRIPTION - DESCRIPTORS: DESCRIPTORS: ACHING

## 2024-12-25 ASSESSMENT — PAIN SCALES - WONG BAKER: WONGBAKER_NUMERICALRESPONSE: NO HURT

## 2024-12-25 ASSESSMENT — PAIN SCALES - GENERAL
PAINLEVEL_OUTOF10: 2
PAINLEVEL_OUTOF10: 5

## 2024-12-25 NOTE — PROCEDURES
PROCEDURE NOTE  Date: 12/25/2024   Name: Tato Cannon  YOB: 1940    Procedures  EKG completed, given to Vinny HANNAH

## 2024-12-26 LAB
EKG ATRIAL RATE: 61 BPM
EKG P AXIS: 30 DEGREES
EKG P-R INTERVAL: 168 MS
EKG Q-T INTERVAL: 408 MS
EKG QRS DURATION: 108 MS
EKG QTC CALCULATION (BAZETT): 410 MS
EKG R AXIS: -53 DEGREES
EKG T AXIS: 150 DEGREES
EKG VENTRICULAR RATE: 61 BPM

## 2024-12-26 PROCEDURE — 6370000000 HC RX 637 (ALT 250 FOR IP): Performed by: INTERNAL MEDICINE

## 2024-12-26 PROCEDURE — 93005 ELECTROCARDIOGRAM TRACING: CPT | Performed by: INTERNAL MEDICINE

## 2024-12-26 PROCEDURE — 6370000000 HC RX 637 (ALT 250 FOR IP)

## 2024-12-26 PROCEDURE — 99233 SBSQ HOSP IP/OBS HIGH 50: CPT | Performed by: INTERNAL MEDICINE

## 2024-12-26 PROCEDURE — 1200000003 HC TELEMETRY R&B

## 2024-12-26 PROCEDURE — 2500000003 HC RX 250 WO HCPCS: Performed by: NURSE PRACTITIONER

## 2024-12-26 PROCEDURE — 6370000000 HC RX 637 (ALT 250 FOR IP): Performed by: NURSE PRACTITIONER

## 2024-12-26 PROCEDURE — 93010 ELECTROCARDIOGRAM REPORT: CPT | Performed by: INTERNAL MEDICINE

## 2024-12-26 PROCEDURE — 6360000002 HC RX W HCPCS

## 2024-12-26 RX ADMIN — METHOCARBAMOL 1000 MG: 500 TABLET ORAL at 12:07

## 2024-12-26 RX ADMIN — APIXABAN 5 MG: 5 TABLET, FILM COATED ORAL at 21:02

## 2024-12-26 RX ADMIN — SODIUM CHLORIDE, PRESERVATIVE FREE 10 ML: 5 INJECTION INTRAVENOUS at 21:02

## 2024-12-26 RX ADMIN — BUMETANIDE 1 MG: 0.25 INJECTION INTRAMUSCULAR; INTRAVENOUS at 18:07

## 2024-12-26 RX ADMIN — Medication 100 MG: at 09:08

## 2024-12-26 RX ADMIN — POLYETHYLENE GLYCOL 3350 17 G: 17 POWDER, FOR SOLUTION ORAL at 09:11

## 2024-12-26 RX ADMIN — TAMSULOSIN HYDROCHLORIDE 0.4 MG: 0.4 CAPSULE ORAL at 09:10

## 2024-12-26 RX ADMIN — DIGOXIN 62.5 MCG: 0.12 TABLET ORAL at 09:08

## 2024-12-26 RX ADMIN — METHOCARBAMOL 1000 MG: 500 TABLET ORAL at 21:02

## 2024-12-26 RX ADMIN — METHOCARBAMOL 1000 MG: 500 TABLET ORAL at 18:07

## 2024-12-26 RX ADMIN — SODIUM CHLORIDE, PRESERVATIVE FREE 10 ML: 5 INJECTION INTRAVENOUS at 09:14

## 2024-12-26 RX ADMIN — HEPARIN SODIUM 16 UNITS/KG/HR: 10000 INJECTION, SOLUTION INTRAVENOUS at 01:32

## 2024-12-26 RX ADMIN — BUMETANIDE 1 MG: 0.25 INJECTION INTRAMUSCULAR; INTRAVENOUS at 09:18

## 2024-12-26 RX ADMIN — QUETIAPINE FUMARATE 25 MG: 25 TABLET ORAL at 09:14

## 2024-12-26 RX ADMIN — METOPROLOL TARTRATE 50 MG: 50 TABLET, FILM COATED ORAL at 21:02

## 2024-12-26 RX ADMIN — APIXABAN 5 MG: 5 TABLET, FILM COATED ORAL at 09:13

## 2024-12-26 RX ADMIN — SPIRONOLACTONE 25 MG: 25 TABLET ORAL at 09:09

## 2024-12-26 RX ADMIN — METOPROLOL TARTRATE 50 MG: 50 TABLET, FILM COATED ORAL at 09:10

## 2024-12-26 RX ADMIN — AMIODARONE HYDROCHLORIDE 200 MG: 200 TABLET ORAL at 09:10

## 2024-12-26 RX ADMIN — FAMOTIDINE 20 MG: 20 TABLET, FILM COATED ORAL at 09:09

## 2024-12-26 RX ADMIN — METHOCARBAMOL 1000 MG: 500 TABLET ORAL at 09:09

## 2024-12-26 RX ADMIN — LEVOTHYROXINE SODIUM 50 MCG: 0.05 TABLET ORAL at 09:09

## 2024-12-26 NOTE — PALLIATIVE CARE
Follow Up / Progress Note        Patient:   Tato Cannon  YOB: 1940  Age:  84 y.o.  Room:  [facility]  MRN:  [id number]               Plan/Follow-Up:  Chart reviewed. Palliative care remains available if needed, please call for additional needs.         Electronically signed by Cliff Lassiter RN on 12/26/2024 at 1:59 PM             Palliative Care Office: [phone]

## 2024-12-26 NOTE — PROCEDURES
PROCEDURE NOTE  Date: 12/26/2024   Name: Tato Cannon  YOB: 1940    Procedures EKG completed, give to RN

## 2024-12-27 PROCEDURE — 99233 SBSQ HOSP IP/OBS HIGH 50: CPT | Performed by: INTERNAL MEDICINE

## 2024-12-27 PROCEDURE — 1200000003 HC TELEMETRY R&B

## 2024-12-27 PROCEDURE — 6360000002 HC RX W HCPCS

## 2024-12-27 PROCEDURE — 6370000000 HC RX 637 (ALT 250 FOR IP): Performed by: INTERNAL MEDICINE

## 2024-12-27 PROCEDURE — 2500000003 HC RX 250 WO HCPCS: Performed by: NURSE PRACTITIONER

## 2024-12-27 PROCEDURE — 6370000000 HC RX 637 (ALT 250 FOR IP)

## 2024-12-27 PROCEDURE — 6370000000 HC RX 637 (ALT 250 FOR IP): Performed by: NURSE PRACTITIONER

## 2024-12-27 RX ADMIN — FAMOTIDINE 20 MG: 20 TABLET, FILM COATED ORAL at 08:27

## 2024-12-27 RX ADMIN — SPIRONOLACTONE 25 MG: 25 TABLET ORAL at 08:27

## 2024-12-27 RX ADMIN — APIXABAN 5 MG: 5 TABLET, FILM COATED ORAL at 20:36

## 2024-12-27 RX ADMIN — Medication 100 MG: at 08:28

## 2024-12-27 RX ADMIN — APIXABAN 5 MG: 5 TABLET, FILM COATED ORAL at 08:27

## 2024-12-27 RX ADMIN — METHOCARBAMOL 1000 MG: 500 TABLET ORAL at 20:36

## 2024-12-27 RX ADMIN — BUMETANIDE 1 MG: 0.25 INJECTION INTRAMUSCULAR; INTRAVENOUS at 17:53

## 2024-12-27 RX ADMIN — SODIUM CHLORIDE, PRESERVATIVE FREE 10 ML: 5 INJECTION INTRAVENOUS at 08:31

## 2024-12-27 RX ADMIN — METHOCARBAMOL 1000 MG: 500 TABLET ORAL at 15:20

## 2024-12-27 RX ADMIN — METHOCARBAMOL 1000 MG: 500 TABLET ORAL at 08:28

## 2024-12-27 RX ADMIN — QUETIAPINE FUMARATE 25 MG: 25 TABLET ORAL at 08:27

## 2024-12-27 RX ADMIN — AMIODARONE HYDROCHLORIDE 200 MG: 200 TABLET ORAL at 10:01

## 2024-12-27 RX ADMIN — LEVOTHYROXINE SODIUM 50 MCG: 0.05 TABLET ORAL at 05:17

## 2024-12-27 RX ADMIN — TAMSULOSIN HYDROCHLORIDE 0.4 MG: 0.4 CAPSULE ORAL at 08:28

## 2024-12-27 RX ADMIN — BUMETANIDE 1 MG: 0.25 INJECTION INTRAMUSCULAR; INTRAVENOUS at 08:15

## 2024-12-27 RX ADMIN — SODIUM CHLORIDE, PRESERVATIVE FREE 10 ML: 5 INJECTION INTRAVENOUS at 20:37

## 2024-12-27 RX ADMIN — POLYETHYLENE GLYCOL 3350 17 G: 17 POWDER, FOR SOLUTION ORAL at 08:31

## 2024-12-27 RX ADMIN — METOPROLOL TARTRATE 50 MG: 50 TABLET, FILM COATED ORAL at 20:36

## 2024-12-27 RX ADMIN — METHOCARBAMOL 1000 MG: 500 TABLET ORAL at 17:46

## 2024-12-27 ASSESSMENT — PAIN SCALES - GENERAL: PAINLEVEL_OUTOF10: 0

## 2024-12-28 PROCEDURE — 6360000002 HC RX W HCPCS

## 2024-12-28 PROCEDURE — 6370000000 HC RX 637 (ALT 250 FOR IP): Performed by: NURSE PRACTITIONER

## 2024-12-28 PROCEDURE — 6370000000 HC RX 637 (ALT 250 FOR IP)

## 2024-12-28 PROCEDURE — 1200000003 HC TELEMETRY R&B

## 2024-12-28 PROCEDURE — 2500000003 HC RX 250 WO HCPCS: Performed by: NURSE PRACTITIONER

## 2024-12-28 PROCEDURE — 6370000000 HC RX 637 (ALT 250 FOR IP): Performed by: INTERNAL MEDICINE

## 2024-12-28 PROCEDURE — 99232 SBSQ HOSP IP/OBS MODERATE 35: CPT | Performed by: INTERNAL MEDICINE

## 2024-12-28 PROCEDURE — 51702 INSERT TEMP BLADDER CATH: CPT

## 2024-12-28 RX ADMIN — Medication 100 MG: at 10:05

## 2024-12-28 RX ADMIN — LEVOTHYROXINE SODIUM 50 MCG: 0.05 TABLET ORAL at 10:05

## 2024-12-28 RX ADMIN — FAMOTIDINE 20 MG: 20 TABLET, FILM COATED ORAL at 10:05

## 2024-12-28 RX ADMIN — QUETIAPINE FUMARATE 25 MG: 25 TABLET ORAL at 10:05

## 2024-12-28 RX ADMIN — METHOCARBAMOL 1000 MG: 500 TABLET ORAL at 18:03

## 2024-12-28 RX ADMIN — AMIODARONE HYDROCHLORIDE 200 MG: 200 TABLET ORAL at 10:05

## 2024-12-28 RX ADMIN — SPIRONOLACTONE 25 MG: 25 TABLET ORAL at 10:05

## 2024-12-28 RX ADMIN — APIXABAN 5 MG: 5 TABLET, FILM COATED ORAL at 20:23

## 2024-12-28 RX ADMIN — POLYETHYLENE GLYCOL 3350 17 G: 17 POWDER, FOR SOLUTION ORAL at 10:05

## 2024-12-28 RX ADMIN — METHOCARBAMOL 1000 MG: 500 TABLET ORAL at 20:23

## 2024-12-28 RX ADMIN — BUMETANIDE 1 MG: 0.25 INJECTION INTRAMUSCULAR; INTRAVENOUS at 18:03

## 2024-12-28 RX ADMIN — METHOCARBAMOL 1000 MG: 500 TABLET ORAL at 10:05

## 2024-12-28 RX ADMIN — TAMSULOSIN HYDROCHLORIDE 0.4 MG: 0.4 CAPSULE ORAL at 10:05

## 2024-12-28 RX ADMIN — SODIUM CHLORIDE, PRESERVATIVE FREE 10 ML: 5 INJECTION INTRAVENOUS at 10:08

## 2024-12-28 RX ADMIN — APIXABAN 5 MG: 5 TABLET, FILM COATED ORAL at 10:05

## 2024-12-28 RX ADMIN — SODIUM CHLORIDE, PRESERVATIVE FREE 10 ML: 5 INJECTION INTRAVENOUS at 20:24

## 2024-12-28 RX ADMIN — METHOCARBAMOL 1000 MG: 500 TABLET ORAL at 13:23

## 2024-12-28 ASSESSMENT — PAIN SCALES - GENERAL: PAINLEVEL_OUTOF10: 2

## 2024-12-29 LAB
ANION GAP SERPL CALC-SCNC: 11 MEQ/L (ref 8–16)
BASOPHILS ABSOLUTE: 0.1 THOU/MM3 (ref 0–0.1)
BASOPHILS NFR BLD AUTO: 0.9 %
BUN SERPL-MCNC: 27 MG/DL (ref 7–22)
CALCIUM SERPL-MCNC: 8.8 MG/DL (ref 8.5–10.5)
CHLORIDE SERPL-SCNC: 96 MEQ/L (ref 98–111)
CO2 SERPL-SCNC: 29 MEQ/L (ref 23–33)
CREAT SERPL-MCNC: 1.2 MG/DL (ref 0.4–1.2)
DEPRECATED RDW RBC AUTO: 59.2 FL (ref 35–45)
EOSINOPHIL NFR BLD AUTO: 2.2 %
EOSINOPHILS ABSOLUTE: 0.1 THOU/MM3 (ref 0–0.4)
ERYTHROCYTE [DISTWIDTH] IN BLOOD BY AUTOMATED COUNT: 18.5 % (ref 11.5–14.5)
GFR SERPL CREATININE-BSD FRML MDRD: 60 ML/MIN/1.73M2
GLUCOSE SERPL-MCNC: 83 MG/DL (ref 70–108)
HCT VFR BLD AUTO: 38.7 % (ref 42–52)
HGB BLD-MCNC: 11.7 GM/DL (ref 14–18)
IMM GRANULOCYTES # BLD AUTO: 0.03 THOU/MM3 (ref 0–0.07)
IMM GRANULOCYTES NFR BLD AUTO: 0.5 %
LYMPHOCYTES ABSOLUTE: 1 THOU/MM3 (ref 1–4.8)
LYMPHOCYTES NFR BLD AUTO: 14.7 %
MCH RBC QN AUTO: 27.5 PG (ref 26–33)
MCHC RBC AUTO-ENTMCNC: 30.2 GM/DL (ref 32.2–35.5)
MCV RBC AUTO: 91.1 FL (ref 80–94)
MONOCYTES ABSOLUTE: 1.2 THOU/MM3 (ref 0.4–1.3)
MONOCYTES NFR BLD AUTO: 18.4 %
NEUTROPHILS ABSOLUTE: 4.1 THOU/MM3 (ref 1.8–7.7)
NEUTROPHILS NFR BLD AUTO: 63.3 %
NRBC BLD AUTO-RTO: 0 /100 WBC
PLATELET # BLD AUTO: 282 THOU/MM3 (ref 130–400)
PMV BLD AUTO: 9.2 FL (ref 9.4–12.4)
POTASSIUM SERPL-SCNC: 4.2 MEQ/L (ref 3.5–5.2)
RBC # BLD AUTO: 4.25 MILL/MM3 (ref 4.7–6.1)
SODIUM SERPL-SCNC: 136 MEQ/L (ref 135–145)
WBC # BLD AUTO: 6.5 THOU/MM3 (ref 4.8–10.8)

## 2024-12-29 PROCEDURE — 6370000000 HC RX 637 (ALT 250 FOR IP): Performed by: INTERNAL MEDICINE

## 2024-12-29 PROCEDURE — 6370000000 HC RX 637 (ALT 250 FOR IP): Performed by: NURSE PRACTITIONER

## 2024-12-29 PROCEDURE — 51702 INSERT TEMP BLADDER CATH: CPT

## 2024-12-29 PROCEDURE — 2500000003 HC RX 250 WO HCPCS: Performed by: NURSE PRACTITIONER

## 2024-12-29 PROCEDURE — 36415 COLL VENOUS BLD VENIPUNCTURE: CPT

## 2024-12-29 PROCEDURE — 99232 SBSQ HOSP IP/OBS MODERATE 35: CPT | Performed by: INTERNAL MEDICINE

## 2024-12-29 PROCEDURE — 85025 COMPLETE CBC W/AUTO DIFF WBC: CPT

## 2024-12-29 PROCEDURE — 6370000000 HC RX 637 (ALT 250 FOR IP)

## 2024-12-29 PROCEDURE — 1200000003 HC TELEMETRY R&B

## 2024-12-29 PROCEDURE — 80048 BASIC METABOLIC PNL TOTAL CA: CPT

## 2024-12-29 RX ADMIN — AMIODARONE HYDROCHLORIDE 200 MG: 200 TABLET ORAL at 09:05

## 2024-12-29 RX ADMIN — SPIRONOLACTONE 25 MG: 25 TABLET ORAL at 09:06

## 2024-12-29 RX ADMIN — METOPROLOL TARTRATE 50 MG: 50 TABLET, FILM COATED ORAL at 20:04

## 2024-12-29 RX ADMIN — METHOCARBAMOL 1000 MG: 500 TABLET ORAL at 12:54

## 2024-12-29 RX ADMIN — SODIUM CHLORIDE, PRESERVATIVE FREE 10 ML: 5 INJECTION INTRAVENOUS at 09:06

## 2024-12-29 RX ADMIN — SODIUM CHLORIDE, PRESERVATIVE FREE 10 ML: 5 INJECTION INTRAVENOUS at 20:05

## 2024-12-29 RX ADMIN — APIXABAN 5 MG: 5 TABLET, FILM COATED ORAL at 09:05

## 2024-12-29 RX ADMIN — QUETIAPINE FUMARATE 25 MG: 25 TABLET ORAL at 09:06

## 2024-12-29 RX ADMIN — METHOCARBAMOL 1000 MG: 500 TABLET ORAL at 16:55

## 2024-12-29 RX ADMIN — POLYETHYLENE GLYCOL 3350 17 G: 17 POWDER, FOR SOLUTION ORAL at 09:06

## 2024-12-29 RX ADMIN — METHOCARBAMOL 1000 MG: 500 TABLET ORAL at 20:04

## 2024-12-29 RX ADMIN — TAMSULOSIN HYDROCHLORIDE 0.4 MG: 0.4 CAPSULE ORAL at 09:06

## 2024-12-29 RX ADMIN — APIXABAN 5 MG: 5 TABLET, FILM COATED ORAL at 20:04

## 2024-12-29 RX ADMIN — FAMOTIDINE 20 MG: 20 TABLET, FILM COATED ORAL at 09:05

## 2024-12-29 RX ADMIN — METHOCARBAMOL 1000 MG: 500 TABLET ORAL at 09:06

## 2024-12-29 RX ADMIN — Medication 100 MG: at 09:06

## 2024-12-29 RX ADMIN — LEVOTHYROXINE SODIUM 50 MCG: 0.05 TABLET ORAL at 09:06

## 2024-12-30 PROCEDURE — 6370000000 HC RX 637 (ALT 250 FOR IP)

## 2024-12-30 PROCEDURE — 97110 THERAPEUTIC EXERCISES: CPT

## 2024-12-30 PROCEDURE — 6370000000 HC RX 637 (ALT 250 FOR IP): Performed by: INTERNAL MEDICINE

## 2024-12-30 PROCEDURE — 6370000000 HC RX 637 (ALT 250 FOR IP): Performed by: NURSE PRACTITIONER

## 2024-12-30 PROCEDURE — 1200000003 HC TELEMETRY R&B

## 2024-12-30 PROCEDURE — 97530 THERAPEUTIC ACTIVITIES: CPT

## 2024-12-30 PROCEDURE — 99232 SBSQ HOSP IP/OBS MODERATE 35: CPT | Performed by: INTERNAL MEDICINE

## 2024-12-30 PROCEDURE — 2500000003 HC RX 250 WO HCPCS: Performed by: NURSE PRACTITIONER

## 2024-12-30 RX ORDER — FAMOTIDINE 20 MG/1
20 TABLET, FILM COATED ORAL DAILY
Qty: 60 TABLET | Refills: 3 | Status: SHIPPED | OUTPATIENT
Start: 2024-12-31

## 2024-12-30 RX ORDER — AMIODARONE HYDROCHLORIDE 200 MG/1
200 TABLET ORAL DAILY
Qty: 30 TABLET | Refills: 4 | Status: SHIPPED | OUTPATIENT
Start: 2024-12-31

## 2024-12-30 RX ORDER — LEVOTHYROXINE SODIUM 50 UG/1
50 TABLET ORAL DAILY
Qty: 30 TABLET | Refills: 3 | Status: SHIPPED | OUTPATIENT
Start: 2024-12-31

## 2024-12-30 RX ORDER — METOPROLOL TARTRATE 50 MG
50 TABLET ORAL 2 TIMES DAILY
Qty: 60 TABLET | Refills: 3 | Status: SHIPPED | OUTPATIENT
Start: 2024-12-30

## 2024-12-30 RX ORDER — METHOCARBAMOL 1000 MG/1
1000 TABLET, FILM COATED ORAL 4 TIMES DAILY
Qty: 120 TABLET | Refills: 1 | Status: SHIPPED | OUTPATIENT
Start: 2024-12-30 | End: 2025-01-09

## 2024-12-30 RX ORDER — RIVASTIGMINE 4.6 MG/24H
1 PATCH, EXTENDED RELEASE TRANSDERMAL DAILY
Qty: 30 PATCH | Refills: 3 | Status: SHIPPED | OUTPATIENT
Start: 2024-12-31

## 2024-12-30 RX ORDER — LANOLIN ALCOHOL/MO/W.PET/CERES
100 CREAM (GRAM) TOPICAL DAILY
Qty: 30 TABLET | Refills: 3 | Status: SHIPPED | OUTPATIENT
Start: 2024-12-31

## 2024-12-30 RX ORDER — IPRATROPIUM BROMIDE AND ALBUTEROL SULFATE 2.5; .5 MG/3ML; MG/3ML
3 SOLUTION RESPIRATORY (INHALATION) EVERY 4 HOURS PRN
Qty: 360 ML | Refills: 3 | Status: SHIPPED | OUTPATIENT
Start: 2024-12-30

## 2024-12-30 RX ORDER — DIGOXIN 0.06 MG/1
62.5 TABLET ORAL DAILY
Qty: 30 TABLET | Refills: 3 | Status: SHIPPED | OUTPATIENT
Start: 2024-12-31

## 2024-12-30 RX ORDER — QUETIAPINE FUMARATE 25 MG/1
25 TABLET, FILM COATED ORAL EVERY MORNING
Qty: 60 TABLET | Refills: 3 | Status: SHIPPED | OUTPATIENT
Start: 2024-12-31

## 2024-12-30 RX ORDER — LIDOCAINE 4 G/G
2 PATCH TOPICAL DAILY
Qty: 30 PATCH | Refills: 1 | Status: SHIPPED | OUTPATIENT
Start: 2024-12-31

## 2024-12-30 RX ADMIN — Medication 100 MG: at 07:42

## 2024-12-30 RX ADMIN — METOPROLOL TARTRATE 50 MG: 50 TABLET, FILM COATED ORAL at 19:52

## 2024-12-30 RX ADMIN — FAMOTIDINE 20 MG: 20 TABLET, FILM COATED ORAL at 07:42

## 2024-12-30 RX ADMIN — TAMSULOSIN HYDROCHLORIDE 0.4 MG: 0.4 CAPSULE ORAL at 07:42

## 2024-12-30 RX ADMIN — APIXABAN 5 MG: 5 TABLET, FILM COATED ORAL at 19:52

## 2024-12-30 RX ADMIN — APIXABAN 5 MG: 5 TABLET, FILM COATED ORAL at 07:58

## 2024-12-30 RX ADMIN — METHOCARBAMOL 1000 MG: 500 TABLET ORAL at 19:52

## 2024-12-30 RX ADMIN — SODIUM CHLORIDE, PRESERVATIVE FREE 10 ML: 5 INJECTION INTRAVENOUS at 07:43

## 2024-12-30 RX ADMIN — AMIODARONE HYDROCHLORIDE 200 MG: 200 TABLET ORAL at 07:42

## 2024-12-30 RX ADMIN — METHOCARBAMOL 1000 MG: 500 TABLET ORAL at 07:42

## 2024-12-30 RX ADMIN — METHOCARBAMOL 1000 MG: 500 TABLET ORAL at 16:33

## 2024-12-30 RX ADMIN — QUETIAPINE FUMARATE 25 MG: 25 TABLET ORAL at 07:42

## 2024-12-30 RX ADMIN — SODIUM CHLORIDE, PRESERVATIVE FREE 10 ML: 5 INJECTION INTRAVENOUS at 19:52

## 2024-12-30 RX ADMIN — POLYETHYLENE GLYCOL 3350 17 G: 17 POWDER, FOR SOLUTION ORAL at 07:41

## 2024-12-30 RX ADMIN — METHOCARBAMOL 1000 MG: 500 TABLET ORAL at 14:00

## 2024-12-30 RX ADMIN — LEVOTHYROXINE SODIUM 50 MCG: 0.05 TABLET ORAL at 07:42

## 2024-12-30 RX ADMIN — SPIRONOLACTONE 25 MG: 25 TABLET ORAL at 07:42

## 2024-12-30 RX ADMIN — ACETAMINOPHEN 650 MG: 325 TABLET ORAL at 07:42

## 2024-12-30 ASSESSMENT — PAIN DESCRIPTION - ORIENTATION: ORIENTATION: RIGHT

## 2024-12-30 ASSESSMENT — PAIN SCALES - GENERAL
PAINLEVEL_OUTOF10: 2
PAINLEVEL_OUTOF10: 1
PAINLEVEL_OUTOF10: 0
PAINLEVEL_OUTOF10: 1

## 2024-12-30 ASSESSMENT — PAIN DESCRIPTION - LOCATION: LOCATION: RIB CAGE

## 2024-12-30 ASSESSMENT — PAIN DESCRIPTION - DESCRIPTORS: DESCRIPTORS: ACHING

## 2024-12-30 ASSESSMENT — PAIN DESCRIPTION - FREQUENCY: FREQUENCY: INTERMITTENT

## 2024-12-30 ASSESSMENT — PAIN DESCRIPTION - ONSET: ONSET: ON-GOING

## 2024-12-30 ASSESSMENT — PAIN DESCRIPTION - PAIN TYPE: TYPE: ACUTE PAIN

## 2024-12-30 ASSESSMENT — PAIN - FUNCTIONAL ASSESSMENT: PAIN_FUNCTIONAL_ASSESSMENT: ACTIVITIES ARE NOT PREVENTED

## 2024-12-30 NOTE — DISCHARGE INSTR - COC
Continuity of Care Form    Patient Name: Tato Cannon   :  1940  MRN:  [id number]    Admit date:  2024  Discharge date:  2024    Code Status Order: DNR-CCA   Advance Directives:   Advance Care Flowsheet Documentation             Admitting Physician:  No admitting provider for patient encounter.  PCP: Mao Spencer MD    Discharging Nurse: Xiomara HANNAH  Discharging Hospital Unit/Room#: 7K-04/004-A  Discharging Unit Phone Number: [phone]    Emergency Contact:   Extended Emergency Contact Information  Primary Emergency Contact: Kristel Thomson   [facility]  Home Phone: [phone]  Relation: Child  Secondary Emergency Contact: Silas Cannon   [facility]  Home Phone: [phone]  Relation: Child    Past Surgical History:  History reviewed. No pertinent surgical history.    Immunization History:     There is no immunization history on file for this patient.    Active Problems:  Patient Active Problem List   Diagnosis Code    Acute on chronic systolic congestive heart failure ([facility]) I50.23    Atrial fibrillation ([facility]) I48.91    Decompensated heart failure ([facility]) I50.9    Atrial fibrillation with rapid ventricular response ([facility]) I48.91    Acute diastolic congestive heart failure ([facility]) I50.31    Cellulitis and abscess of leg L03.119, L02.419    Elevated alkaline phosphatase level R74.8    Abnormal liver ultrasound R93.2    Hypoalbuminemia E88.09    Hypokalemia E87.6    Chronic combined systolic and diastolic CHF (congestive heart failure) ([facility]) I50.42    Fall down steps, initial encounter W10.8XXA    Closed fracture of multiple ribs of right side S22.41XA    Pneumothorax on right J93.9    Bilateral pleural effusion J90    Right pulmonary contusion S27.321A    Compression fracture of L4 vertebra ([facility]) S32.040A    Fall W19.XXXA    Pneumothorax, traumatic S27.0XXA    NICM (nonischemic cardiomyopathy) ([facility]) I42.8    Sinus bradycardia R00.1    Acute delirium R41.0

## 2024-12-30 NOTE — PLAN OF CARE
Problem: Chronic Conditions and Co-morbidities  Goal: Patient's chronic conditions and co-morbidity symptoms are monitored and maintained or improved  12/20/2024 0050 by Priti Tam RN  Outcome: Progressing  Flowsheets (Taken 12/16/2024 0800 by Lucia Mata RN)  Care Plan - Patient's Chronic Conditions and Co-Morbidity Symptoms are Monitored and Maintained or Improved:   Monitor and assess patient's chronic conditions and comorbid symptoms for stability, deterioration, or improvement   Collaborate with multidisciplinary team to address chronic and comorbid conditions and prevent exacerbation or deterioration   Update acute care plan with appropriate goals if chronic or comorbid symptoms are exacerbated and prevent overall improvement and discharge     Problem: Discharge Planning  Goal: Discharge to home or other facility with appropriate resources  12/20/2024 0050 by Priti Tam RN  Outcome: Progressing  Flowsheets (Taken 12/16/2024 0800 by Lucia Mata RN)  Discharge to home or other facility with appropriate resources:   Identify barriers to discharge with patient and caregiver   Arrange for needed discharge resources and transportation as appropriate   Identify discharge learning needs (meds, wound care, etc)     Problem: Pain  Goal: Verbalizes/displays adequate comfort level or baseline comfort level  12/20/2024 0050 by Priti Tam RN  Outcome: Progressing  Flowsheets (Taken 12/16/2024 0915 by Lucia Mata RN)  Verbalizes/displays adequate comfort level or baseline comfort level:   Encourage patient to monitor pain and request assistance   Assess pain using appropriate pain scale   Administer analgesics based on type and severity of pain and evaluate response   Implement non-pharmacological measures as appropriate and evaluate response     Problem: Safety - Adult  Goal: Free from fall injury  12/20/2024 0050 by Priti Tam RN  Outcome: Progressing  Flowsheets (Taken 
  Problem: Chronic Conditions and Co-morbidities  Goal: Patient's chronic conditions and co-morbidity symptoms are monitored and maintained or improved  12/24/2024 1116 by Melisa Gilman LPN  Outcome: Progressing  Flowsheets (Taken 12/24/2024 1116)  Care Plan - Patient's Chronic Conditions and Co-Morbidity Symptoms are Monitored and Maintained or Improved:   Monitor and assess patient's chronic conditions and comorbid symptoms for stability, deterioration, or improvement   Collaborate with multidisciplinary team to address chronic and comorbid conditions and prevent exacerbation or deterioration   Update acute care plan with appropriate goals if chronic or comorbid symptoms are exacerbated and prevent overall improvement and discharge  12/24/2024 0613 by Rosa Dang RN  Outcome: Progressing  Flowsheets (Taken 12/23/2024 0106 by Melisa Gilman LPN)  Care Plan - Patient's Chronic Conditions and Co-Morbidity Symptoms are Monitored and Maintained or Improved:   Monitor and assess patient's chronic conditions and comorbid symptoms for stability, deterioration, or improvement   Collaborate with multidisciplinary team to address chronic and comorbid conditions and prevent exacerbation or deterioration   Update acute care plan with appropriate goals if chronic or comorbid symptoms are exacerbated and prevent overall improvement and discharge     Problem: Discharge Planning  Goal: Discharge to home or other facility with appropriate resources  12/24/2024 1116 by Melisa Gilman LPN  Outcome: Progressing  Flowsheets (Taken 12/24/2024 1116)  Discharge to home or other facility with appropriate resources: Identify barriers to discharge with patient and caregiver  12/24/2024 0613 by Rosa Dang RN  Outcome: Progressing  Flowsheets (Taken 12/23/2024 1624 by Dawna Thompson, RN)  Discharge to home or other facility with appropriate resources: Identify barriers to discharge with patient and caregiver     Problem: 
  Problem: Chronic Conditions and Co-morbidities  Goal: Patient's chronic conditions and co-morbidity symptoms are monitored and maintained or improved  12/27/2024 0004 by Cora Gomez RN  Outcome: Progressing  Flowsheets (Taken 12/27/2024 0004)  Care Plan - Patient's Chronic Conditions and Co-Morbidity Symptoms are Monitored and Maintained or Improved:   Monitor and assess patient's chronic conditions and comorbid symptoms for stability, deterioration, or improvement   Collaborate with multidisciplinary team to address chronic and comorbid conditions and prevent exacerbation or deterioration   Update acute care plan with appropriate goals if chronic or comorbid symptoms are exacerbated and prevent overall improvement and discharge     Problem: Discharge Planning  Goal: Discharge to home or other facility with appropriate resources  12/27/2024 0004 by Cora Gomez RN  Outcome: Progressing  Flowsheets (Taken 12/27/2024 0004)  Discharge to home or other facility with appropriate resources:   Identify barriers to discharge with patient and caregiver   Arrange for needed discharge resources and transportation as appropriate   Identify discharge learning needs (meds, wound care, etc)     Problem: Pain  Goal: Verbalizes/displays adequate comfort level or baseline comfort level  12/27/2024 0004 by Cora Gomez RN  Outcome: Progressing  Flowsheets (Taken 12/27/2024 0004)  Verbalizes/displays adequate comfort level or baseline comfort level:   Encourage patient to monitor pain and request assistance   Assess pain using appropriate pain scale   Administer analgesics based on type and severity of pain and evaluate response   Implement non-pharmacological measures as appropriate and evaluate response     Problem: Safety - Adult  Goal: Free from fall injury  12/27/2024 0004 by Cora Gomez RN  Outcome: Progressing  Flowsheets (Taken 12/27/2024 0004)  Free From Fall Injury: Instruct family/caregiver on patient 
  Problem: Chronic Conditions and Co-morbidities  Goal: Patient's chronic conditions and co-morbidity symptoms are monitored and maintained or improved  12/28/2024 0644 by Stephany Gates RN  Outcome: Progressing  12/27/2024 1906 by Kaley Georges LPN  Outcome: Progressing  Flowsheets (Taken 12/27/2024 1906)  Care Plan - Patient's Chronic Conditions and Co-Morbidity Symptoms are Monitored and Maintained or Improved:   Monitor and assess patient's chronic conditions and comorbid symptoms for stability, deterioration, or improvement   Collaborate with multidisciplinary team to address chronic and comorbid conditions and prevent exacerbation or deterioration   Update acute care plan with appropriate goals if chronic or comorbid symptoms are exacerbated and prevent overall improvement and discharge     Problem: Discharge Planning  Goal: Discharge to home or other facility with appropriate resources  12/28/2024 0644 by Stephany Gates RN  Outcome: Progressing  12/27/2024 1906 by Kaley Georges LPN  Outcome: Progressing  Flowsheets (Taken 12/27/2024 1906)  Discharge to home or other facility with appropriate resources:   Identify barriers to discharge with patient and caregiver   Arrange for needed discharge resources and transportation as appropriate   Identify discharge learning needs (meds, wound care, etc)     Problem: Pain  Goal: Verbalizes/displays adequate comfort level or baseline comfort level  12/28/2024 0644 by Stephany Gates RN  Outcome: Progressing  12/27/2024 1906 by Kaley Georges LPN  Outcome: Progressing  Flowsheets (Taken 12/27/2024 1906)  Verbalizes/displays adequate comfort level or baseline comfort level:   Encourage patient to monitor pain and request assistance   Assess pain using appropriate pain scale   Implement non-pharmacological measures as appropriate and evaluate response   Administer analgesics based on type and severity of pain and evaluate response     Problem: Safety - 
  Problem: Chronic Conditions and Co-morbidities  Goal: Patient's chronic conditions and co-morbidity symptoms are monitored and maintained or improved  12/28/2024 1116 by Chantel Harris LPN  Outcome: Progressing  Flowsheets (Taken 12/28/2024 1116)  Care Plan - Patient's Chronic Conditions and Co-Morbidity Symptoms are Monitored and Maintained or Improved: Monitor and assess patient's chronic conditions and comorbid symptoms for stability, deterioration, or improvement     Problem: Discharge Planning  Goal: Discharge to home or other facility with appropriate resources  12/28/2024 1116 by Chantel Harris LPN  Outcome: Progressing  Flowsheets (Taken 12/28/2024 1116)  Discharge to home or other facility with appropriate resources: Identify barriers to discharge with patient and caregiver     Problem: Pain  Goal: Verbalizes/displays adequate comfort level or baseline comfort level  12/28/2024 1116 by Chantel Harris LPN  Outcome: Progressing  Flowsheets (Taken 12/28/2024 1116)  Verbalizes/displays adequate comfort level or baseline comfort level: Encourage patient to monitor pain and request assistance     Problem: Safety - Adult  Goal: Free from fall injury  12/28/2024 1116 by Chantel Harris LPN  Outcome: Progressing  Flowsheets (Taken 12/28/2024 1116)  Free From Fall Injury: Instruct family/caregiver on patient safety     Problem: Cardiovascular - Adult  Goal: Maintains optimal cardiac output and hemodynamic stability  Outcome: Progressing  Flowsheets (Taken 12/28/2024 1116)  Maintains optimal cardiac output and hemodynamic stability: Monitor blood pressure and heart rate     Problem: Cardiovascular - Adult  Goal: Absence of cardiac dysrhythmias or at baseline  Outcome: Progressing  Flowsheets (Taken 12/28/2024 1116)  Absence of cardiac dysrhythmias or at baseline: Monitor cardiac rate and rhythm     Problem: Metabolic/Fluid and Electrolytes - Adult  Goal: Electrolytes maintained within 
  Problem: Chronic Conditions and Co-morbidities  Goal: Patient's chronic conditions and co-morbidity symptoms are monitored and maintained or improved  12/30/2024 0856 by Chantel Harris LPN  Outcome: Progressing  Flowsheets (Taken 12/30/2024 0856)  Care Plan - Patient's Chronic Conditions and Co-Morbidity Symptoms are Monitored and Maintained or Improved: Monitor and assess patient's chronic conditions and comorbid symptoms for stability, deterioration, or improvement     Problem: Discharge Planning  Goal: Discharge to home or other facility with appropriate resources  12/30/2024 0856 by Chantel Harris LPN  Outcome: Progressing  Flowsheets (Taken 12/30/2024 0856)  Discharge to home or other facility with appropriate resources: Identify barriers to discharge with patient and caregiver     Problem: Pain  Goal: Verbalizes/displays adequate comfort level or baseline comfort level  12/30/2024 0856 by Chantel Harris LPN  Outcome: Progressing  Flowsheets (Taken 12/30/2024 0856)  Verbalizes/displays adequate comfort level or baseline comfort level: Encourage patient to monitor pain and request assistance     Problem: Safety - Adult  Goal: Free from fall injury  12/30/2024 0856 by Chantel Harris LPN  Outcome: Progressing  Flowsheets (Taken 12/30/2024 0856)  Free From Fall Injury: Instruct family/caregiver on patient safety     Problem: Cardiovascular - Adult  Goal: Maintains optimal cardiac output and hemodynamic stability  Outcome: Progressing  Flowsheets (Taken 12/30/2024 0856)  Maintains optimal cardiac output and hemodynamic stability: Monitor blood pressure and heart rate     Problem: Cardiovascular - Adult  Goal: Absence of cardiac dysrhythmias or at baseline  Outcome: Progressing  Flowsheets (Taken 12/30/2024 0856)  Absence of cardiac dysrhythmias or at baseline: Monitor cardiac rate and rhythm     Problem: Metabolic/Fluid and Electrolytes - Adult  Goal: Electrolytes maintained within 
  Problem: Chronic Conditions and Co-morbidities  Goal: Patient's chronic conditions and co-morbidity symptoms are monitored and maintained or improved  Flowsheets (Taken 12/21/2024 2222)  Care Plan - Patient's Chronic Conditions and Co-Morbidity Symptoms are Monitored and Maintained or Improved:   Monitor and assess patient's chronic conditions and comorbid symptoms for stability, deterioration, or improvement   Collaborate with multidisciplinary team to address chronic and comorbid conditions and prevent exacerbation or deterioration   Update acute care plan with appropriate goals if chronic or comorbid symptoms are exacerbated and prevent overall improvement and discharge     Problem: Discharge Planning  Goal: Discharge to home or other facility with appropriate resources  Flowsheets (Taken 12/21/2024 2222)  Discharge to home or other facility with appropriate resources:   Identify barriers to discharge with patient and caregiver   Identify discharge learning needs (meds, wound care, etc)   Refer to discharge planning if patient needs post-hospital services based on physician order or complex needs related to functional status, cognitive ability or social support system     Problem: Pain  Goal: Verbalizes/displays adequate comfort level or baseline comfort level  Flowsheets (Taken 12/21/2024 2222)  Verbalizes/displays adequate comfort level or baseline comfort level:   Encourage patient to monitor pain and request assistance   Administer analgesics based on type and severity of pain and evaluate response   Consider cultural and social influences on pain and pain management   Assess pain using appropriate pain scale   Implement non-pharmacological measures as appropriate and evaluate response   Notify Licensed Independent Practitioner if interventions unsuccessful or patient reports new pain     Problem: Safety - Adult  Goal: Free from fall injury  Flowsheets (Taken 12/21/2024 2222)  Free From Fall Injury:   
  Problem: Chronic Conditions and Co-morbidities  Goal: Patient's chronic conditions and co-morbidity symptoms are monitored and maintained or improved  Outcome: Progressing     Problem: Discharge Planning  Goal: Discharge to home or other facility with appropriate resources  Outcome: Progressing     Problem: Pain  Goal: Verbalizes/displays adequate comfort level or baseline comfort level  Outcome: Progressing     
  Problem: Chronic Conditions and Co-morbidities  Goal: Patient's chronic conditions and co-morbidity symptoms are monitored and maintained or improved  Outcome: Progressing     Problem: Discharge Planning  Goal: Discharge to home or other facility with appropriate resources  Outcome: Progressing     Problem: Pain  Goal: Verbalizes/displays adequate comfort level or baseline comfort level  Outcome: Progressing     Problem: Safety - Adult  Goal: Free from fall injury  Outcome: Progressing     
  Problem: Chronic Conditions and Co-morbidities  Goal: Patient's chronic conditions and co-morbidity symptoms are monitored and maintained or improved  Outcome: Progressing     Problem: Discharge Planning  Goal: Discharge to home or other facility with appropriate resources  Outcome: Progressing     Problem: Pain  Goal: Verbalizes/displays adequate comfort level or baseline comfort level  Outcome: Progressing     Problem: Safety - Adult  Goal: Free from fall injury  Outcome: Progressing     
  Problem: Chronic Conditions and Co-morbidities  Goal: Patient's chronic conditions and co-morbidity symptoms are monitored and maintained or improved  Outcome: Progressing     Problem: Discharge Planning  Goal: Discharge to home or other facility with appropriate resources  Outcome: Progressing     Problem: Pain  Goal: Verbalizes/displays adequate comfort level or baseline comfort level  Outcome: Progressing     Problem: Safety - Adult  Goal: Free from fall injury  Outcome: Progressing     Care plan reviewed with patient.  Patient verbalize understanding of the plan of care and contribute to goal setting.      
  Problem: Chronic Conditions and Co-morbidities  Goal: Patient's chronic conditions and co-morbidity symptoms are monitored and maintained or improved  Outcome: Progressing     Problem: Discharge Planning  Goal: Discharge to home or other facility with appropriate resources  Outcome: Progressing     Problem: Pain  Goal: Verbalizes/displays adequate comfort level or baseline comfort level  Outcome: Progressing     Problem: Safety - Adult  Goal: Free from fall injury  Outcome: Progressing     Care plan reviewed with patient.  Patient verbalize understanding of the plan of care and contribute to goal setting.      
  Problem: Chronic Conditions and Co-morbidities  Goal: Patient's chronic conditions and co-morbidity symptoms are monitored and maintained or improved  Outcome: Progressing     Problem: Discharge Planning  Goal: Discharge to home or other facility with appropriate resources  Outcome: Progressing     Problem: Pain  Goal: Verbalizes/displays adequate comfort level or baseline comfort level  Outcome: Progressing     Problem: Safety - Adult  Goal: Free from fall injury  Outcome: Progressing     Problem: Cardiovascular - Adult  Goal: Maintains optimal cardiac output and hemodynamic stability  Outcome: Progressing  Goal: Absence of cardiac dysrhythmias or at baseline  Outcome: Progressing     Problem: Metabolic/Fluid and Electrolytes - Adult  Goal: Electrolytes maintained within normal limits  Outcome: Progressing     Problem: Safety - Medical Restraint  Goal: Remains free of injury from restraints (Restraint for Interference with Medical Device)  Description: INTERVENTIONS:  1. Determine that other, less restrictive measures have been tried or would not be effective before applying the restraint  2. Evaluate the patient's condition at the time of restraint application  3. Inform patient/family regarding the reason for restraint  4. Q2H: Monitor safety, psychosocial status, comfort, nutrition and hydration  Outcome: Progressing     Problem: Skin/Tissue Integrity  Goal: Absence of new skin breakdown  Description: 1.  Monitor for areas of redness and/or skin breakdown  2.  Assess vascular access sites hourly  3.  Every 4-6 hours minimum:  Change oxygen saturation probe site  4.  Every 4-6 hours:  If on nasal continuous positive airway pressure, respiratory therapy assess nares and determine need for appliance change or resting period.  Outcome: Progressing     
  Problem: Chronic Conditions and Co-morbidities  Goal: Patient's chronic conditions and co-morbidity symptoms are monitored and maintained or improved  Outcome: Progressing  Flowsheets  Taken 12/16/2024 0015  Care Plan - Patient's Chronic Conditions and Co-Morbidity Symptoms are Monitored and Maintained or Improved: Monitor and assess patient's chronic conditions and comorbid symptoms for stability, deterioration, or improvement  Taken 12/15/2024 2015  Care Plan - Patient's Chronic Conditions and Co-Morbidity Symptoms are Monitored and Maintained or Improved: Monitor and assess patient's chronic conditions and comorbid symptoms for stability, deterioration, or improvement     Problem: Discharge Planning  Goal: Discharge to home or other facility with appropriate resources  Outcome: Progressing  Flowsheets  Taken 12/16/2024 0015  Discharge to home or other facility with appropriate resources:   Identify barriers to discharge with patient and caregiver   Arrange for needed discharge resources and transportation as appropriate   Arrange for interpreters to assist at discharge as needed  Taken 12/15/2024 2015  Discharge to home or other facility with appropriate resources:   Identify barriers to discharge with patient and caregiver   Identify discharge learning needs (meds, wound care, etc)   Arrange for needed discharge resources and transportation as appropriate   Refer to discharge planning if patient needs post-hospital services based on physician order or complex needs related to functional status, cognitive ability or social support system     Problem: Pain  Goal: Verbalizes/displays adequate comfort level or baseline comfort level  Outcome: Progressing     Problem: Safety - Adult  Goal: Free from fall injury  Outcome: Progressing     Problem: Cardiovascular - Adult  Goal: Maintains optimal cardiac output and hemodynamic stability  Outcome: Progressing  Flowsheets  Taken 12/16/2024 0015  Maintains optimal cardiac 
  Problem: Chronic Conditions and Co-morbidities  Goal: Patient's chronic conditions and co-morbidity symptoms are monitored and maintained or improved  Outcome: Progressing  Flowsheets (Taken 12/14/2024 2000)  Care Plan - Patient's Chronic Conditions and Co-Morbidity Symptoms are Monitored and Maintained or Improved: Monitor and assess patient's chronic conditions and comorbid symptoms for stability, deterioration, or improvement     Problem: Discharge Planning  Goal: Discharge to home or other facility with appropriate resources  Outcome: Progressing  Flowsheets (Taken 12/14/2024 2000)  Discharge to home or other facility with appropriate resources: Identify barriers to discharge with patient and caregiver     Problem: Pain  Goal: Verbalizes/displays adequate comfort level or baseline comfort level  Outcome: Progressing     Problem: Safety - Adult  Goal: Free from fall injury  Outcome: Progressing     Problem: Cardiovascular - Adult  Goal: Maintains optimal cardiac output and hemodynamic stability  Outcome: Progressing  Flowsheets (Taken 12/14/2024 2000)  Maintains optimal cardiac output and hemodynamic stability: Monitor blood pressure and heart rate   Care plan reviewed with patient.  Patient verbalize understanding of the plan of care and contribute to goal setting.     
  Problem: Chronic Conditions and Co-morbidities  Goal: Patient's chronic conditions and co-morbidity symptoms are monitored and maintained or improved  Outcome: Progressing  Flowsheets (Taken 12/16/2024 0800 by Lucia Mata, RN)  Care Plan - Patient's Chronic Conditions and Co-Morbidity Symptoms are Monitored and Maintained or Improved:   Monitor and assess patient's chronic conditions and comorbid symptoms for stability, deterioration, or improvement   Collaborate with multidisciplinary team to address chronic and comorbid conditions and prevent exacerbation or deterioration   Update acute care plan with appropriate goals if chronic or comorbid symptoms are exacerbated and prevent overall improvement and discharge     Problem: Discharge Planning  Goal: Discharge to home or other facility with appropriate resources  Outcome: Progressing  Flowsheets (Taken 12/16/2024 0800 by Lucia Mata, RN)  Discharge to home or other facility with appropriate resources:   Identify barriers to discharge with patient and caregiver   Arrange for needed discharge resources and transportation as appropriate   Identify discharge learning needs (meds, wound care, etc)     Problem: Discharge Planning  Goal: Discharge to home or other facility with appropriate resources  Outcome: Progressing  Flowsheets (Taken 12/16/2024 0800 by Lucia Mata, RN)  Discharge to home or other facility with appropriate resources:   Identify barriers to discharge with patient and caregiver   Arrange for needed discharge resources and transportation as appropriate   Identify discharge learning needs (meds, wound care, etc)     Problem: Pain  Goal: Verbalizes/displays adequate comfort level or baseline comfort level  Outcome: Progressing  Flowsheets (Taken 12/16/2024 0915 by Lucia Mata, RN)  Verbalizes/displays adequate comfort level or baseline comfort level:   Encourage patient to monitor pain and request assistance   Assess 
  Problem: Chronic Conditions and Co-morbidities  Goal: Patient's chronic conditions and co-morbidity symptoms are monitored and maintained or improved  Outcome: Progressing  Flowsheets (Taken 12/22/2024 1427)  Care Plan - Patient's Chronic Conditions and Co-Morbidity Symptoms are Monitored and Maintained or Improved:   Monitor and assess patient's chronic conditions and comorbid symptoms for stability, deterioration, or improvement   Collaborate with multidisciplinary team to address chronic and comorbid conditions and prevent exacerbation or deterioration   Update acute care plan with appropriate goals if chronic or comorbid symptoms are exacerbated and prevent overall improvement and discharge     Problem: Discharge Planning  Goal: Discharge to home or other facility with appropriate resources  Outcome: Progressing  Flowsheets (Taken 12/22/2024 1427)  Discharge to home or other facility with appropriate resources:   Identify barriers to discharge with patient and caregiver   Identify discharge learning needs (meds, wound care, etc)   Refer to discharge planning if patient needs post-hospital services based on physician order or complex needs related to functional status, cognitive ability or social support system   Arrange for needed discharge resources and transportation as appropriate   Arrange for interpreters to assist at discharge as needed     Problem: Pain  Goal: Verbalizes/displays adequate comfort level or baseline comfort level  Outcome: Progressing  Flowsheets (Taken 12/22/2024 1427)  Verbalizes/displays adequate comfort level or baseline comfort level:   Administer analgesics based on type and severity of pain and evaluate response   Consider cultural and social influences on pain and pain management   Assess pain using appropriate pain scale   Implement non-pharmacological measures as appropriate and evaluate response   Notify Licensed Independent Practitioner if interventions unsuccessful or patient 
  Problem: Chronic Conditions and Co-morbidities  Goal: Patient's chronic conditions and co-morbidity symptoms are monitored and maintained or improved  Outcome: Progressing  Flowsheets (Taken 12/26/2024 1835)  Care Plan - Patient's Chronic Conditions and Co-Morbidity Symptoms are Monitored and Maintained or Improved:   Monitor and assess patient's chronic conditions and comorbid symptoms for stability, deterioration, or improvement   Collaborate with multidisciplinary team to address chronic and comorbid conditions and prevent exacerbation or deterioration     Problem: Discharge Planning  Goal: Discharge to home or other facility with appropriate resources  Outcome: Progressing  Flowsheets (Taken 12/26/2024 1835)  Discharge to home or other facility with appropriate resources:   Identify barriers to discharge with patient and caregiver   Arrange for needed discharge resources and transportation as appropriate     Problem: Pain  Goal: Verbalizes/displays adequate comfort level or baseline comfort level  Outcome: Progressing  Flowsheets (Taken 12/26/2024 1835)  Verbalizes/displays adequate comfort level or baseline comfort level:   Encourage patient to monitor pain and request assistance   Assess pain using appropriate pain scale   Administer analgesics based on type and severity of pain and evaluate response     Problem: Safety - Adult  Goal: Free from fall injury  Outcome: Progressing  Flowsheets (Taken 12/26/2024 1835)  Free From Fall Injury: Instruct family/caregiver on patient safety     Problem: Cardiovascular - Adult  Goal: Maintains optimal cardiac output and hemodynamic stability  Outcome: Progressing  Flowsheets (Taken 12/26/2024 1835)  Maintains optimal cardiac output and hemodynamic stability: Monitor blood pressure and heart rate     Problem: Cardiovascular - Adult  Goal: Absence of cardiac dysrhythmias or at baseline  Outcome: Progressing     Problem: Metabolic/Fluid and Electrolytes - Adult  Goal: 
  Problem: Chronic Conditions and Co-morbidities  Goal: Patient's chronic conditions and co-morbidity symptoms are monitored and maintained or improved  Outcome: Progressing  Flowsheets (Taken 12/27/2024 1906)  Care Plan - Patient's Chronic Conditions and Co-Morbidity Symptoms are Monitored and Maintained or Improved:   Monitor and assess patient's chronic conditions and comorbid symptoms for stability, deterioration, or improvement   Collaborate with multidisciplinary team to address chronic and comorbid conditions and prevent exacerbation or deterioration   Update acute care plan with appropriate goals if chronic or comorbid symptoms are exacerbated and prevent overall improvement and discharge     Problem: Discharge Planning  Goal: Discharge to home or other facility with appropriate resources  Outcome: Progressing  Flowsheets (Taken 12/27/2024 1906)  Discharge to home or other facility with appropriate resources:   Identify barriers to discharge with patient and caregiver   Arrange for needed discharge resources and transportation as appropriate   Identify discharge learning needs (meds, wound care, etc)     Problem: Pain  Goal: Verbalizes/displays adequate comfort level or baseline comfort level  Outcome: Progressing  Flowsheets (Taken 12/27/2024 1906)  Verbalizes/displays adequate comfort level or baseline comfort level:   Encourage patient to monitor pain and request assistance   Assess pain using appropriate pain scale   Implement non-pharmacological measures as appropriate and evaluate response   Administer analgesics based on type and severity of pain and evaluate response     Problem: Safety - Adult  Goal: Free from fall injury  Outcome: Progressing  Flowsheets (Taken 12/27/2024 1906)  Free From Fall Injury: Instruct family/caregiver on patient safety     Problem: Cardiovascular - Adult  Goal: Maintains optimal cardiac output and hemodynamic stability  Outcome: Progressing  Flowsheets (Taken 12/27/2024 
  Problem: Discharge Planning  Goal: Discharge to home or other facility with appropriate resources  12/11/2024 2116 by Jer Quan RN  Flowsheets (Taken 12/11/2024 2116)  Discharge to home or other facility with appropriate resources:   Identify barriers to discharge with patient and caregiver   Arrange for needed discharge resources and transportation as appropriate     Problem: Pain  Goal: Verbalizes/displays adequate comfort level or baseline comfort level  12/11/2024 2116 by Jer Quan RN  Flowsheets (Taken 12/11/2024 2116)  Verbalizes/displays adequate comfort level or baseline comfort level:   Encourage patient to monitor pain and request assistance   Assess pain using appropriate pain scale   Administer analgesics based on type and severity of pain and evaluate response   Implement non-pharmacological measures as appropriate and evaluate response   Consider cultural and social influences on pain and pain management     Problem: Safety - Adult  Goal: Free from fall injury  12/11/2024 2116 by Jer Quan RN  Flowsheets (Taken 12/11/2024 2116)  Free From Fall Injury: Instruct family/caregiver on patient safety     Problem: Cardiovascular - Adult  Goal: Maintains optimal cardiac output and hemodynamic stability  Flowsheets (Taken 12/11/2024 2116)  Maintains optimal cardiac output and hemodynamic stability:   Monitor blood pressure and heart rate   Monitor urine output and notify Licensed Independent Practitioner for values outside of normal range   Assess for signs of decreased cardiac output     Problem: Cardiovascular - Adult  Goal: Absence of cardiac dysrhythmias or at baseline  Flowsheets (Taken 12/11/2024 2116)  Absence of cardiac dysrhythmias or at baseline:   Monitor cardiac rate and rhythm   Assess for signs of decreased cardiac output     Problem: Metabolic/Fluid and Electrolytes - Adult  Goal: Electrolytes maintained within normal limits  Flowsheets (Taken 12/11/2024 
  Problem: Discharge Planning  Goal: Discharge to home or other facility with appropriate resources  Outcome: Progressing   See SW note  
  Problem: Safety - Medical Restraint  Goal: Remains free of injury from restraints (Restraint for Interference with Medical Device)  Description: INTERVENTIONS:  1. Determine that other, less restrictive measures have been tried or would not be effective before applying the restraint  2. Evaluate the patient's condition at the time of restraint application  3. Inform patient/family regarding the reason for restraint  4. Q2H: Monitor safety, psychosocial status, comfort, nutrition and hydration  12/16/2024 1130 by Lucia Mata RN  Outcome: Completed     Restraints no longer needed. Care plan completed. Patient appropriate.   
  Problem: Safety - Medical Restraint  Goal: Remains free of injury from restraints (Restraint for Interference with Medical Device)  Description: INTERVENTIONS:  1. Determine that other, less restrictive measures have been tried or would not be effective before applying the restraint  2. Evaluate the patient's condition at the time of restraint application  3. Inform patient/family regarding the reason for restraint  4. Q2H: Monitor safety, psychosocial status, comfort, nutrition and hydration  12/24/2024 0618 by Rosa Dang RN  Outcome: Progressing  Flowsheets (Taken 12/23/2024 0730 by Dawna Thompson RN)  Remains free of injury from restraints (restraint for interference with medical device):   Determine that other, less restrictive measures have been tried or would not be effective before applying the restraint   Evaluate the patient's condition at the time of restraint application   Inform patient/family regarding the reason for restraint   Every 2 hours: Monitor safety, psychosocial status, comfort, nutrition and hydration  12/24/2024 0613 by Rosa Dang RN  Outcome: Progressing  12/23/2024 1624 by Dawna Thompson RN  Outcome: Progressing  Flowsheets (Taken 12/23/2024 0730)  Remains free of injury from restraints (restraint for interference with medical device):   Determine that other, less restrictive measures have been tried or would not be effective before applying the restraint   Evaluate the patient's condition at the time of restraint application   Inform patient/family regarding the reason for restraint   Every 2 hours: Monitor safety, psychosocial status, comfort, nutrition and hydration     
  Problem: Safety - Medical Restraint  Goal: Remains free of injury from restraints (Restraint for Interference with Medical Device)  Description: INTERVENTIONS:  1. Determine that other, less restrictive measures have been tried or would not be effective before applying the restraint  2. Evaluate the patient's condition at the time of restraint application  3. Inform patient/family regarding the reason for restraint  4. Q2H: Monitor safety, psychosocial status, comfort, nutrition and hydration  Outcome: Progressing  Flowsheets  Taken 12/20/2024 1800 by Joe Limon RN  Remains free of injury from restraints (restraint for interference with medical device): Every 2 hours: Monitor safety, psychosocial status, comfort, nutrition and hydration  Taken 12/20/2024 1600 by Joe Limon RN  Remains free of injury from restraints (restraint for interference with medical device): Every 2 hours: Monitor safety, psychosocial status, comfort, nutrition and hydration  Taken 12/20/2024 1400 by Joe Limon RN  Remains free of injury from restraints (restraint for interference with medical device):   Every 2 hours: Monitor safety, psychosocial status, comfort, nutrition and hydration   Determine that other, less restrictive measures have been tried or would not be effective before applying the restraint  Taken 12/20/2024 0800 by Joe Limon RN  Remains free of injury from restraints (restraint for interference with medical device): Determine that other, less restrictive measures have been tried or would not be effective before applying the restraint     
  Problem: Safety - Medical Restraint  Goal: Remains free of injury from restraints (Restraint for Interference with Medical Device)  Description: INTERVENTIONS:  1. Determine that other, less restrictive measures have been tried or would not be effective before applying the restraint  2. Evaluate the patient's condition at the time of restraint application  3. Inform patient/family regarding the reason for restraint  4. Q2H: Monitor safety, psychosocial status, comfort, nutrition and hydration  Outcome: Progressing  Flowsheets (Taken 12/18/2024 4715 by Faye Leblanc RN)  Remains free of injury from restraints (restraint for interference with medical device): Determine that other, less restrictive measures have been tried or would not be effective before applying the restraint     
Contacted by trauma service for hospitalist to take over as primary. I have taken over as primary. Evaluated patient at bedside, denies any complains. Continue with current plan of care.   
IM Resident/Hospitalist Plan of Care Note     Notified by RN of significant bradycardia with heart rate in the 40s to 50s.  Presented to bedside.  Patient asymptomatic.  Stat EKG ordered which showed marked sinus bradycardia with PACs, no AV block.  Review of EMR notes the patient on amiodarone drip due to A-fib with RVR.  Patient was also transition from Toprol 100 mg to Lopressor 100 mg twice daily on 12/10.  On 12/10, patient received Toprol 100 mg in the morning and then received evening dose of Lopressor 100 mg.  Due to significant asymptomatic bradycardia, Lopressor and amiodarone drip were held.  Nursing instructed to place pacer pads on patient.  Atropine 0.5 mg ordered as once as needed for heart rate <30, with instructions to notify physician before giving.  Instructed to have this atropine dose at bedside.  RN updated on plan.  Cardiology currently following, will continue to see in AM.      Electronically signed by Anya Grier DO on 12/11/2024 at 3:52 AM   
IM Resident/Hospitalist Plan of Care Note     Notified by RN patient having heart rate 120s to 130s, telemetry showing A-fib with RVR.  Per EMR, has been an ongoing issue with patient going into A-fib with RVR overnight.  Previous night, patient was started on amiodarone bolus and drip due to A-fib with RVR and unable to tolerate p.o. medications.  Amiodarone drip was discontinued in the morning due to heart rate down to the 60s.  RN confirms the patient is now able to take oral medications crushed in purée.  Decision made to start patient on p.o. Lopressor 12.5 mg twice daily for rate control.  Will continue to monitor response.    Electronically signed by Anya Grier DO on 12/14/2024 at 8:16 PM     Update 9:00 PM  Notified by RN that HR now in 130's to 140's post p.o. Lopressor. Patient continues to be asymptomatic. Due to worsening rate, will start amiodarone drip with rate reduction, without initial bolus in hopes to control rate without too severe reduction in heart rate. Will continue to monitor.    Electronically signed by Anya Grier DO on 12/14/2024 at 9:06 PM     Update 5:00 AM  Overnight notified that heart rate after amiodarone rate reduction was initially 110s to 120s, however has increased back to 130s to 150s consistently.  Prior to rate reduction heart rate was 110s to 120s.  Will change amiodarone rate back to initial rate of 1 mg/min.    Electronically signed by Anya Grier DO on 12/15/2024 at 5:02 AM   
Plan of Care:    Patient seen and evaluated at bedside. A&Ox4. Vitally stable saturating at 95% on RA. Patient currently reports no complaints.  Reports no chest pain or shortness of breath. Reports persistent cough; reports pleuritic left-sided chest wall pain exacerbated by cough.  Reports eating and drinking normally. Reports normal urination and bowel movements. Reports sleeping well.  Denies fever, nausea/vomiting, chest pain, SOB, constipation/diarrhea, headache, dizziness, diaphoresis, numbness/tingling.  Reports left-sided chest wall pain, chronic cough.       Assessment and Plan:-  Trauma by Fall: Multiple right-sided rib fractures with right pulmonary contusion, right apical pneumothorax.  Managed per primary.  Atrial Fibrillation with RVR: EKG showing A-fib with RVR, rate in the 150s.  Appears at home patient is on Toprol-XL and Eliquis. Per prior documentation had previously been on amiodarone and was stopped due to elevated LFTs, also previously on digoxin. Patient was given bolus of diltiazem in ER.  No significant improvement in heart rate and BP on lower side.  Will start amiodarone bolus plus drip.  Plan to resume beta-blocker in morning if BP tolerates.  Cardiology consult.  Acute on chronic HFrEF: Most recent echo 10/10/2024 showing EF of 35 to 40%.  Follows with Dr. العراقي and heart failure clinic.  Per last cardiology note, on Toprol, valsartan, spironolactone, Bumex 0.5 mg BID.  CT chest showing bilateral pleural effusion, right > left, CT abdomen showing diffuse anasarca, mesenteric edema, small volume ascites.  BNP 4522.+3 pitting edema in bilateral lower extremities.  Per trauma note, plan for ultrasound-guided thoracentesis of right side with IR tomorrow. BP low normal.  Was down to 99 systolic.  Started on amiodarone for A-fib with RVR.  Will plan to resume GDMT if BP tolerating.  Start Bumex 1 mg twice a day.  Monitor strict intake and output, daily weights.  Sodium and fluid restriction 
Plan of care with goals of care:    Patient has been bradycardic during the day and appears to have worsening mentation.  Bradycardia may be  secondary to multiple AV jamey blocking agents that were started in attempt to control atrial fibrillation with RVR.  Heart rate has been persistently in the 40s and 50s throughout the day with soft blood pressures with maps in the 70s to 80s.  The patient has had worsening confusion today and is disoriented to place, though assessment of mentation is somewhat confounded by difficulty hearing.  Patient also has a worsening CRYSTAL.  His hands and feet are cool to touch and lactate has increased from 3.0 to 4.8.  There is concern that poor cardiac output is contributing to decrease in mentation and end organ damage.  500 mL fluid bolus of 5% albumin solution was administered and all AV jamey blocking agents will continue to be held per ICU recommendation. Lactate will be trended every 4 hours.  ICU was made aware of the patient and will follow overnight in case the patient decompensates and requires IV pacing, dopamine, pressor support, or other ICU level care.      Electronically signed by Quentin Mcadams MD 12/11/2024 at 6:14 PM    Addendum: I spoke with the patients son in the room and he clarified the patents baseline. The patient is normally well oriented ans is currently delirious with waxing and waning mentation. The patient normally sleeps for about 12 hours per day and used to work 3rd shift so he normally goes to bed around 6 am.  The son is not aware of the patients hands and feet being cold to touch at baseline but does say the patient often complain of feeling cold.    Goals of care:  I spoke to the patient's son regarding goals of care as patient is not capacity to make decisions this time.  Patient will remain full code for the time being and is open to pressor support and intubation as needed.  He will continue discussed goals of care with his sister.  
with medical device):   Determine that other, less restrictive measures have been tried or would not be effective before applying the restraint   Evaluate the patient's condition at the time of restraint application   Every 2 hours: Monitor safety, psychosocial status, comfort, nutrition and hydration  Taken 12/12/2024 0200 by Jer Quan RN  Remains free of injury from restraints (restraint for interference with medical device):   Determine that other, less restrictive measures have been tried or would not be effective before applying the restraint   Evaluate the patient's condition at the time of restraint application   Every 2 hours: Monitor safety, psychosocial status, comfort, nutrition and hydration  Taken 12/12/2024 0003 by Jre Quan RN  Remains free of injury from restraints (restraint for interference with medical device):   Determine that other, less restrictive measures have been tried or would not be effective before applying the restraint   Evaluate the patient's condition at the time of restraint application   Every 2 hours: Monitor safety, psychosocial status, comfort, nutrition and hydration     Problem: Skin/Tissue Integrity  Goal: Absence of new skin breakdown  Description: 1.  Monitor for areas of redness and/or skin breakdown  2.  Assess vascular access sites hourly  3.  Every 4-6 hours minimum:  Change oxygen saturation probe site  4.  Every 4-6 hours:  If on nasal continuous positive airway pressure, respiratory therapy assess nares and determine need for appliance change or resting period.  Outcome: Progressing     
Adult  Goal: Urinary catheter remains patent  Outcome: Progressing  Flowsheets (Taken 12/29/2024 1021)  Urinary catheter remains patent: Assess patency of urinary catheter     Problem: Infection - Adult  Goal: Absence of infection at discharge  Outcome: Progressing  Flowsheets (Taken 12/29/2024 1021)  Absence of infection at discharge: Assess and monitor for signs and symptoms of infection     Problem: Infection - Adult  Goal: Absence of infection during hospitalization  Outcome: Progressing  Flowsheets (Taken 12/29/2024 1021)  Absence of infection during hospitalization: Assess and monitor for signs and symptoms of infection     Problem: Nutrition Deficit:  Goal: Optimize nutritional status  Outcome: Progressing  Flowsheets (Taken 12/29/2024 1021)  Nutrient intake appropriate for improving, restoring, or maintaining nutritional needs: Assess nutritional status and recommend course of action     Problem: ABCDS Injury Assessment  Goal: Absence of physical injury  Outcome: Progressing  Flowsheets (Taken 12/29/2024 1021)  Absence of Physical Injury: Implement safety measures based on patient assessment   Care plan reviewed with patient.  Patient verbalized understanding of the plan of care and contribute to goal setting.     
Marok, Miranda, RN  Remains free of injury from restraints (restraint for interference with medical device): Every 2 hours: Monitor safety, psychosocial status, comfort, nutrition and hydration  Taken 12/20/2024 2000 by Miranda Cordova RN  Remains free of injury from restraints (restraint for interference with medical device): Every 2 hours: Monitor safety, psychosocial status, comfort, nutrition and hydration  Problem: Skin/Tissue Integrity  Goal: Absence of new skin breakdown  Description: 1.  Monitor for areas of redness and/or skin breakdown  2.  Assess vascular access sites hourly  3.  Every 4-6 hours minimum:  Change oxygen saturation probe site  4.  Every 4-6 hours:  If on nasal continuous positive airway pressure, respiratory therapy assess nares and determine need for appliance change or resting period.  Note: No new skin breakdown     
swallowing and airway patency with patient fatigue and changes in neurological status   Encourage and assist patient to increase activity and self care with guidance from physical therapy/occupational therapy     Problem: Respiratory - Adult  Goal: Achieves optimal ventilation and oxygenation  Outcome: Not Progressing  Flowsheets (Taken 12/23/2024 1624)  Achieves optimal ventilation and oxygenation:   Assess for changes in respiratory status   Assess for changes in mentation and behavior     Problem: Skin/Tissue Integrity - Adult  Goal: Skin integrity remains intact  Outcome: Not Progressing  Flowsheets (Taken 12/23/2024 1624)  Skin Integrity Remains Intact: Monitor for areas of redness and/or skin breakdown  Goal: Incisions, wounds, or drain sites healing without S/S of infection  Outcome: Not Progressing  Flowsheets (Taken 12/23/2024 1624)  Incisions, Wounds, or Drain Sites Healing Without Sign and Symptoms of Infection: Implement wound care per orders     Problem: Musculoskeletal - Adult  Goal: Return mobility to safest level of function  Outcome: Not Progressing  Flowsheets (Taken 12/23/2024 1624)  Return Mobility to Safest Level of Function:   Assess patient stability and activity tolerance for standing, transferring and ambulating with or without assistive devices   Assist with transfers and ambulation using safe patient handling equipment as needed  Goal: Return ADL status to a safe level of function  Outcome: Not Progressing  Flowsheets (Taken 12/23/2024 1624)  Return ADL Status to a Safe Level of Function: Administer medication as ordered     Problem: Gastrointestinal - Adult  Goal: Minimal or absence of nausea and vomiting  Outcome: Not Progressing  Flowsheets (Taken 12/23/2024 1624)  Minimal or absence of nausea and vomiting: Maintain NPO status until nausea and vomiting are resolved  Goal: Maintains adequate nutritional intake  Outcome: Not Progressing  Flowsheets (Taken 12/23/2024 1624)  Maintains 
12/16/2024 0800)  Absence of urinary retention:   Assess patient’s ability to void and empty bladder   Monitor intake/output and perform bladder scan as needed   Place urinary catheter per Licensed Independent Practitioner order if needed   Discuss with Licensed Independent Practitioner  medications to alleviate retention as needed   Discuss catheterization for long term situations as appropriate     Problem: Genitourinary - Adult  Goal: Urinary catheter remains patent  Outcome: Progressing  Flowsheets (Taken 12/16/2024 0800)  Urinary catheter remains patent:   Assess patency of urinary catheter   Irrigate catheter per Licensed Independent Practitioner order if indicated and notify Licensed Independent Practitioner if unable to irrigate   Assess need for a larger catheter size or a 3-way catheter for continuous bladder irrigation     Problem: Infection - Adult  Goal: Absence of infection at discharge  Outcome: Progressing  Flowsheets (Taken 12/16/2024 0800)  Absence of infection at discharge:   Assess and monitor for signs and symptoms of infection   Monitor lab/diagnostic results   Monitor all insertion sites i.e., indwelling lines, tubes and drains   [place] appropriate cooling/warming therapies per order   Administer medications as ordered   Instruct and encourage patient and family to use good hand hygiene technique     Problem: Infection - Adult  Goal: Absence of infection during hospitalization  Outcome: Progressing  Flowsheets (Taken 12/16/2024 0800)  Absence of infection during hospitalization:   Assess and monitor for signs and symptoms of infection   Monitor lab/diagnostic results   Monitor all insertion sites i.e., indwelling lines, tubes and drains   [place] appropriate cooling/warming therapies per order   Administer medications as ordered   Instruct and encourage patient and family to use good hand hygiene technique     Care plan reviewed with patient and family.  Patient and family verbalize 
needed   Encourage mobilization and activity   Nutrition consult to assist patient with appropriate food choices  Goal: Maintains adequate nutritional intake  12/19/2024 0654 by Priti Tam RN  Outcome: Progressing  Flowsheets (Taken 12/16/2024 0800 by Lucia Mata RN)  Maintains adequate nutritional intake:   Monitor percentage of each meal consumed   Identify factors contributing to decreased intake, treat as appropriate   Assist with meals as needed   Monitor intake and output, weight and lab values   Obtain nutritional consult as needed     Problem: Genitourinary - Adult  Goal: Absence of urinary retention  12/19/2024 0654 by Priti Tam RN  Outcome: Progressing  Flowsheets (Taken 12/16/2024 0800 by Lucia Mata, RN)  Absence of urinary retention:   Assess patient’s ability to void and empty bladder   Monitor intake/output and perform bladder scan as needed   Place urinary catheter per Licensed Independent Practitioner order if needed   Discuss with Licensed Independent Practitioner  medications to alleviate retention as needed   Discuss catheterization for long term situations as appropriate  Goal: Urinary catheter remains patent  12/19/2024 1048 by Mirta Boucher RN  Outcome: Progressing  12/19/2024 0654 by Priti Tam RN  Outcome: Progressing  Flowsheets (Taken 12/16/2024 0800 by Lucia Mata RN)  Urinary catheter remains patent:   Assess patency of urinary catheter   Irrigate catheter per Licensed Independent Practitioner order if indicated and notify Licensed Independent Practitioner if unable to irrigate   Assess need for a larger catheter size or a 3-way catheter for continuous bladder irrigation     Problem: Infection - Adult  Goal: Absence of infection at discharge  12/19/2024 1048 by Mirta Boucher RN  Outcome: Progressing  12/19/2024 0654 by Priti Tam RN  Outcome: Progressing  Flowsheets (Taken 12/16/2024 0800 by Lucia Mata RN)  Absence of infection 
Wischmeyer, Dominique, RN  Remains free of injury from restraints (restraint for interference with medical device):   Determine that other, less restrictive measures have been tried or would not be effective before applying the restraint   Every 2 hours: Monitor safety, psychosocial status, comfort, nutrition and hydration  Taken 12/22/2024 0800 by Dominique Warren RN  Remains free of injury from restraints (restraint for interference with medical device):   Determine that other, less restrictive measures have been tried or would not be effective before applying the restraint   Every 2 hours: Monitor safety, psychosocial status, comfort, nutrition and hydration  Taken 12/22/2024 0600 by Miranda Cordova RN  Remains free of injury from restraints (restraint for interference with medical device):   Determine that other, less restrictive measures have been tried or would not be effective before applying the restraint   Every 2 hours: Monitor safety, psychosocial status, comfort, nutrition and hydration   Evaluate the patient's condition at the time of restraint application  Taken 12/22/2024 0400 by Miranda Cordova RN  Remains free of injury from restraints (restraint for interference with medical device):   Determine that other, less restrictive measures have been tried or would not be effective before applying the restraint   Inform patient/family regarding the reason for restraint   Every 2 hours: Monitor safety, psychosocial status, comfort, nutrition and hydration  Taken 12/22/2024 0200 by Miranda Cordova RN  Remains free of injury from restraints (restraint for interference with medical device):   Determine that other, less restrictive measures have been tried or would not be effective before applying the restraint   Evaluate the patient's condition at the time of restraint application   Every 2 hours: Monitor safety, psychosocial status, comfort, nutrition and hydration  Care plan reviewed with patient. Patient verbalize

## 2024-12-31 VITALS
HEART RATE: 51 BPM | TEMPERATURE: 98.4 F | WEIGHT: 186.07 LBS | SYSTOLIC BLOOD PRESSURE: 111 MMHG | OXYGEN SATURATION: 94 % | RESPIRATION RATE: 16 BRPM | DIASTOLIC BLOOD PRESSURE: 57 MMHG | HEIGHT: 69 IN | BODY MASS INDEX: 27.56 KG/M2

## 2024-12-31 PROCEDURE — 6370000000 HC RX 637 (ALT 250 FOR IP)

## 2024-12-31 PROCEDURE — 6370000000 HC RX 637 (ALT 250 FOR IP): Performed by: NURSE PRACTITIONER

## 2024-12-31 PROCEDURE — 2500000003 HC RX 250 WO HCPCS: Performed by: NURSE PRACTITIONER

## 2024-12-31 PROCEDURE — 6370000000 HC RX 637 (ALT 250 FOR IP): Performed by: INTERNAL MEDICINE

## 2024-12-31 PROCEDURE — 99232 SBSQ HOSP IP/OBS MODERATE 35: CPT | Performed by: INTERNAL MEDICINE

## 2024-12-31 RX ADMIN — METOPROLOL TARTRATE 50 MG: 50 TABLET, FILM COATED ORAL at 09:42

## 2024-12-31 RX ADMIN — SPIRONOLACTONE 25 MG: 25 TABLET ORAL at 09:42

## 2024-12-31 RX ADMIN — DIGOXIN 62.5 MCG: 0.12 TABLET ORAL at 09:41

## 2024-12-31 RX ADMIN — QUETIAPINE FUMARATE 25 MG: 25 TABLET ORAL at 09:42

## 2024-12-31 RX ADMIN — SODIUM CHLORIDE, PRESERVATIVE FREE 10 ML: 5 INJECTION INTRAVENOUS at 09:42

## 2024-12-31 RX ADMIN — Medication 100 MG: at 09:42

## 2024-12-31 RX ADMIN — FAMOTIDINE 20 MG: 20 TABLET, FILM COATED ORAL at 09:41

## 2024-12-31 RX ADMIN — METHOCARBAMOL 1000 MG: 500 TABLET ORAL at 16:01

## 2024-12-31 RX ADMIN — AMIODARONE HYDROCHLORIDE 200 MG: 200 TABLET ORAL at 09:41

## 2024-12-31 RX ADMIN — LEVOTHYROXINE SODIUM 50 MCG: 0.05 TABLET ORAL at 04:26

## 2024-12-31 RX ADMIN — METHOCARBAMOL 1000 MG: 500 TABLET ORAL at 09:41

## 2024-12-31 RX ADMIN — TAMSULOSIN HYDROCHLORIDE 0.4 MG: 0.4 CAPSULE ORAL at 09:42

## 2024-12-31 RX ADMIN — POLYETHYLENE GLYCOL 3350 17 G: 17 POWDER, FOR SOLUTION ORAL at 09:39

## 2024-12-31 RX ADMIN — APIXABAN 5 MG: 5 TABLET, FILM COATED ORAL at 09:41

## 2024-12-31 ASSESSMENT — PAIN SCALES - GENERAL
PAINLEVEL_OUTOF10: 8
PAINLEVEL_OUTOF10: 8
PAINLEVEL_OUTOF10: 0

## 2024-12-31 ASSESSMENT — PAIN - FUNCTIONAL ASSESSMENT: PAIN_FUNCTIONAL_ASSESSMENT: ACTIVITIES ARE NOT PREVENTED

## 2024-12-31 NOTE — CARE COORDINATION
12/12/24, 10:18 AM EST    DISCHARGE ON GOING EVALUATION    [facility] day: 3  Location: -06/006-A Reason for admit: Bilateral pleural effusion [J90]  Traumatic pneumothorax, initial encounter [S27.0XXA]  Fall down steps, initial encounter [W10.8XXA]  Fall, initial encounter [W19.XXXA]  Closed fracture of multiple ribs of right side, initial encounter [S22.41XA]  Atrial fibrillation, unspecified type (HCC) [I48.91]     Procedures:   12/10 MBS:   1. Laryngeal penetration of thin barium without evidence of aspiration.  2. Additional recommendations from the speech therapist will follow.  12/11 right thoracentesis: 0.6L removed    Imaging since last note:   12/11 CXR:   Atelectasis or consolidation in the lower lungs.  Mild interstitial thickening is nonspecific. Mild edema is a possibility.  Possible small left pleural effusion.  Right 5th and 6th rib fractures.     Barriers to Discharge: Per report, now confused. IV bumex daily. Aldactone. Pain control. Lactic 2.6. Creatinine 1.9. K+ 5.4.     PCP: Mao Spencer MD  Readmission Risk Score: 18.2    Patient Goals/Plan/Treatment Preferences: Plan pending. Therapy recommended SNF stay.     
12/16/24, 1:43 PM EST  DISCHARGE PLANNING EVALUATION    SW spoke with patient about discharge planning. He reported that he is not sure if he can go home in his current condition. He is open to ECF but would like to discuss it with his son. SW offered to call his children and he reported that he will talk to them about it this evening.     
12/16/24, 3:00 PM EST    DISCHARGE ON GOING EVALUATION    [facility] day: 7  Location: -06/006-A Reason for admit: Bilateral pleural effusion [J90]  Traumatic pneumothorax, initial encounter [S27.0XXA]  Fall down steps, initial encounter [W10.8XXA]  Fall, initial encounter [W19.XXXA]  Closed fracture of multiple ribs of right side, initial encounter [S22.41XA]  Atrial fibrillation, unspecified type (HCC) [I48.91]     Procedures:   12/10 MBS: Passed   12/10 ECHO: EF 35-40%.   12/11 Right Thoracentesis: 0.6L removed   12/14 MBS: Passed     Imaging since last note:   12/13 KUB:  Indeterminate bowel gas pattern with nonspecific gas-filled small bowel loops overlying the mid abdomen. There is also retained contrast within the colon overlying the right lower quadrant. Radiographic follow-up could be obtained if clinically indicated.    Barriers to Discharge: Hospitalist and Cardiology following. PT/OT/SLP. Regular; Nectar thick diet. Wound care q2d. Amio gtt stopped. Remains on Heparin gtt. PO doxycycline. Lidocaine patches. Robaxin. Lopressor adjusted. Start PO amiodarone and digoxin.   Vitals:    12/16/24 0915 12/16/24 0930 12/16/24 1134 12/16/24 1450   BP: 103/85  (!) 160/82 122/89   Pulse: (!) 128 (!) 130 (!) 101 (!) 108   Resp: 16  18    Temp: 97.9 °F (36.6 °C)  97.8 °F (36.6 °C)    TempSrc: Oral  Oral    SpO2: 94%  93%    Weight:       Height:         PCP: Mao Spencer MD  Readmission Risk Score: 18.3    Patient Goals/Plan/Treatment Preferences: From home w/ son. Open to SNF stay, to discuss w/ family tonight. Will require precert. SW following.     
12/17/24, 11:52 AM EST  DISCHARGE PLANNING EVALUATION    SW spoke with patient about discharge planning. He is still open to go to ECF. He was agreeable to have SW call his daughter and son.     SW left voicemail for son Silas.     SW called daughter Kristel and notified her that patient is considering ECF placement. SW encouraged her and her brother to discuss with patient. She reported that they will be visiting him tonight and will talk it over. SW notified her that in network ECF list will be left in patient's room.     SW updated patient on conversation with daughter and left ECF list in room.     Post-acute (PAC) provider list was provided to patient. Patient was informed of their freedom to choose PAC provider. Discussed and offered to show the patient the relevant PAC Providers quality and resource use measures on Medicare Compare web site via computer based on patient's goals of care and treatment preferences. Questions regarding selection process were answered.      
12/17/24, 2:59 PM EST    DISCHARGE ON GOING EVALUATION    [facility] day: 8  Location: -06/006-A Reason for admit: Bilateral pleural effusion [J90]  Traumatic pneumothorax, initial encounter [S27.0XXA]  Fall down steps, initial encounter [W10.8XXA]  Fall, initial encounter [W19.XXXA]  Closed fracture of multiple ribs of right side, initial encounter [S22.41XA]  Atrial fibrillation, unspecified type (HCC) [I48.91]     Procedures:   12/10 MBS: Passed   12/10 ECHO: EF 35-40%.   12/11 Right Thoracentesis: 0.6L removed   12/14 MBS: Passed     Imaging since last note:   12/17 CXR: There is a small right pleural effusion. 2. Diffuse interstitial infiltrates are seen throughout the right lung.    Barriers to Discharge: Hospitalist and Cardiology following. PT/OT/SLP. Regular; Nectar thick diet. Wound care q2d. Remains on heparin gtt. PO amiodarone. IV bumex. PO digoxin. Lidocaine. Robaxin. Aldactone.   Vitals:    12/17/24 0331 12/17/24 0845 12/17/24 1113 12/17/24 1121   BP: 115/85 (!) 123/94 111/72    Pulse: (!) 119 (!) 137 (!) 110 95   Resp: 20 24 22    Temp: 97.5 °F (36.4 °C) 98 °F (36.7 °C) 97.7 °F (36.5 °C)    TempSrc: Oral Oral Oral    SpO2: 95% 96% 94%    Weight: 95.6 kg (210 lb 12.2 oz)      Height:         PCP: Mao Spencer MD  Readmission Risk Score: 17    Patient Goals/Plan/Treatment Preferences: From home w/ son. Open to SNF stay, has list. Will require precert. SW following.     
12/18/24, 12:39 PM EST    DISCHARGE ON GOING EVALUATION    [facility] day: 9  Location: -06/006-A Reason for admit: Bilateral pleural effusion [J90]  Traumatic pneumothorax, initial encounter [S27.0XXA]  Fall down steps, initial encounter [W10.8XXA]  Fall, initial encounter [W19.XXXA]  Closed fracture of multiple ribs of right side, initial encounter [S22.41XA]  Atrial fibrillation, unspecified type (HCC) [I48.91]     Procedures:   12/10 MBS: Passed   12/10 ECHO: EF 35-40%.   12/11 Right Thoracentesis: 0.6L removed   12/14 MBS: Passed     Imaging since last note: none     Barriers to Discharge: Hospitalist and Cardiology following. PT/OT/SLP. Agitated/Confused over HS; given haldol. Live sitter placed in room. Patient attempted to hit staff; placed in bilateral wrist restraints, given additional haldol. Restraints removed this AM. Oriented x4 and cooperative with dayshift. Remains on heparin gtt. IM haldol prn. Lidocaine patches. Mg 1.7- IV replacement. IV bumex BID.     PCP: Mao Spencer MD  Readmission Risk Score: 18.5    Patient Goals/Plan/Treatment Preferences: From home w/ son. Open to SNF stay, has list. Will require precert. SW following.     
12/18/24, 1:29 PM EST    DISCHARGE PLANNING EVALUATION    This SW did stop in patient's room to talk about SNF, no family in room.      Call placed to daughter Kristel, she is requesting referrals made to [facility] and [facility]. SW will make referrals and follow up with daughter.     1:35 PM- Spoke with Riki at [facility], they do not have beds available at this time.     1:38 PM- Called Meeta with [facility], states that beds are tight right now but should have discharges towards the end of the week. Provided referral information, will follow up about precert.   
12/19/24, 10:26 AM EST  DISCHARGE PLANNING EVALUATION    JOHN called Meeta at [facility] and they are still reviewing patient's clinical information at this time. She will let JOHN know if they can accept.     
12/20/24, 1:33 PM EST    DISCHARGE ON GOING EVALUATION    [facility] day: 11  Location: -06/006-A Reason for admit: Bilateral pleural effusion [J90]  Traumatic pneumothorax, initial encounter [S27.0XXA]  Fall down steps, initial encounter [W10.8XXA]  Fall, initial encounter [W19.XXXA]  Closed fracture of multiple ribs of right side, initial encounter [S22.41XA]  Atrial fibrillation, unspecified type (HCC) [I48.91]     Procedures:   12/10 ECHO EF 35-40%  12/11 Right Thoracentesis = 600 ml removed    Barriers to Discharge:   Fall/Traumatic Pneumothorax, Right Ribs Fractures  PMH: NIRAJ Rosario, CHF  Heparin Gtt  IV Diuresing    PCP: Mao Spencer MD  Readmission Risk Score: 18.5    Patient Goals/Plan/Treatment Preferences: from home w arnie Albright; plans new [facility] (precert, therapy following), restraintsNIRAJ, current CHF Clinic    
12/24/24, 3:10 PM EST    DISCHARGE ON GOING EVALUATION    [facility] day: 15  Location: -04/004-A Reason for admit: Bilateral pleural effusion [J90]  Traumatic pneumothorax, initial encounter [S27.0XXA]  Fall down steps, initial encounter [W10.8XXA]  Fall, initial encounter [W19.XXXA]  Closed fracture of multiple ribs of right side, initial encounter [S22.41XA]  Atrial fibrillation, unspecified type (HCC) [I48.91]     Procedures: none    Imaging since last note: none    Barriers to Discharge: Mentation improving per report. PT/OT. Hospitalsit and psych following. Heparin gtt. IV bumex.     PCP: Mao Spencer MD  Readmission Risk Score: 15.8    Patient Goals/Plan/Treatment Preferences: [facility] following. Will require precert.     
12/24/24, 9:55 AM EST    DISCHARGE PLANNING EVALUATION    Call made to [facility], message left to update.  Facility will continue to review closer to discharge, will need precert if accepted once ready for discharge   
12/27/24, 10:19 AM EST    DISCHARGE ON GOING EVALUATION    [facility] day: 18  Location: -04/004-A Reason for admit: Bilateral pleural effusion [J90]  Traumatic pneumothorax, initial encounter [S27.0XXA]  Fall down steps, initial encounter [W10.8XXA]  Fall, initial encounter [W19.XXXA]  Closed fracture of multiple ribs of right side, initial encounter [S22.41XA]  Atrial fibrillation, unspecified type (HCC) [I48.91]     Procedures: none    Imaging since last note: none    Barriers to Discharge: Heparin gtt off, conts with IV Bumex, pulido for retention; needs void trial.    PCP: Mao Spencer MD  Readmission Risk Score: 15.9    Patient Goals/Plan/Treatment Preferences: planning [facility]; will be precert once accepted.    
12/27/24, 1:32 PM EST    DISCHARGE PLANNING EVALUATION    [facility] will accept and will start precert today.   
12/30/24, 9:39 AM EST    DISCHARGE PLANNING EVALUATION    Precert for [facility], will accept when approved.   
12/31/24, 1:51 PM EST    DISCHARGE PLANNING EVALUATION    Plan for [facility] today confirmed with snf, CM confirmed with patient.   Spoke with daughter to update, she is in agreement.     12/31/24, 1:51 PM EST    Patient goals/plan/ treatment preferences discussed by  and .  Patient goals/plan/ treatment preferences reviewed with patient/ family.  Patient/ family verbalize understanding of discharge plan and are in agreement with goal/plan/treatment preferences.  Understanding was demonstrated using the teach back method.  AVS provided by RN at time of discharge, which includes all necessary medical information pertaining to the patients current course of illness, treatment, post-discharge goals of care, and treatment preferences.     Services At/After Discharge: Skilled Nursing Facility (SNF) and In ambulance        
CM Note  Client w [facility] Transfer: Hand-Off given to OBDULIA Blakely CM  Electronically signed by Bro López RN on 12/23/2024 at 8:25 AM    
DISCHARGE PLANNING EVALUATION  12/13/24, 1:55 PM EST    Reason for Referral: Discharge planning  Decision Maker: Self   Current Services: None  New Services Requested: unknown  Family/ Social/ Home environment: Patient lives with his son, who works during the day. SW spoke with daughter Kristel and she reported that patient is a very stubborn man and she is unsure he will be agreeable to ECF or HH. She reported that her and her brother do the best they can to help and support him but he does not always do what he is supposed to do. She reported that patient has signed out AMA multiple times in the past.  Payment Source:[employer]  Transportation at Discharge: family  Post-acute (PAC) provider list was provided to patient. Patient was informed of their freedom to choose PAC provider. Discussed and offered to show the patient the relevant PAC Providers quality and resource use measures on Medicare Compare web site via computer based on patient's goals of care and treatment preferences. Questions regarding selection process were answered.      Teach Back Method used with patient's daughter regarding care plan and needs  Patient verbalized understanding of the plan of care and contribute to goal setting.       Patient preferences and discharge plan: Patient is not alert and oriented at present. Daughter and son are open to discharge recommendations. Plan pending clinical course. SW to follow and assist with discharge planning as needed.     Electronically signed by NANDINI Martinez on 12/13/2024 at 1:55 PM           
  small volume ascites.   2. 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   Additional findings as described.     Patient Goals/Plan/Treatment Preferences: Spoke with Tato, he is from home alone. Has a walker but reports hie is not using it. He drives during day but not at night. He is open to services. Await therapy evals. Likely HH at minimum.        12/10/24 0952   Service Assessment   Patient Orientation Alert and Oriented   Cognition Alert   History Provided By Patient   Primary Caregiver Self   Support Systems Children;Family Members   Patient's Healthcare Decision Maker is: Patient Declined (Legal Next of Kin Remains as Decision Maker)   PCP Verified by CM Yes   Last Visit to PCP Within last year   Prior Functional Level Assistance with the following:;Shopping;Housework   Current Functional Level Shopping;Housework;Assistance with the following:;Bathing;Toileting;Cooking;Mobility   Can patient return to prior living arrangement Unknown at present   Ability to make needs known: Good   Family able to assist with home care needs: Yes   Would you like for me to discuss the discharge plan with any other family members/significant others, and if so, who? Yes  (okay to speak with children if needed.)   Financial Resources Medicare   Social/Functional History   Active  Yes   Discharge Planning   Type of Residence House   Living Arrangements Alone   Current Services Prior To Admission Durable Medical Equipment   Current DME Prior to Arrival Walker   Potential Assistance Needed N/A   DME Ordered? No   Potential Assistance Purchasing Medications No   Type of Home Care Services None   Patient expects to be discharged to: House   Services At/After Discharge   Transition of Care Consult (CM Consult) Discharge Planning   Services At/After Discharge None   Mode of Transport at Discharge Other (see comment)  (family)   Confirm Follow Up Transport Family   Condition of Participation: Discharge Planning   The Plan

## 2024-12-31 NOTE — PROGRESS NOTES
[facility] Adult Intravenous to Oral Protocol    Doxycycline changed to PO per [facility] P&T IV to PO protocol.    Patient meets criteria based on the following:  Tolerating diet more advanced than clear liquids  Tolerating other oral medications  Not on vasopressors  No nausea/vomiting within past 24 hours  No active GI bleed  No gastrectomy, gastric outlet or bowel obstruction, ileus, malabsorption syndrome    Antibiotics only:  Received IV antibiotic for at least 24 hours  Infection is not meningitis, endocarditis, osteomyelitis, or pancreatitis  Afebrile for 24 hours  No clinical deteriorating/instability    Thank you,  Kim Urban, PharmD, BCPS   12/16/2024  7:31 AM      
      Pharmacy Renal Adjustment    Pharmacy renally adjusted the following medication(s) per P&T approved policy: apixaban    Recent Labs     12/10/24  0506 12/11/24 0440   BUN 34* 43*   CREATININE 1.2 1.8*     eGFR:   Recent Labs     12/09/24  2000 12/10/24  0506 12/11/24  0440   LABGLOM 74 60 37*     Estimated Creatinine Clearance: 36 mL/min (A) (based on SCr of 1.8 mg/dL (H)).        Plan:   Adjust  apixaban from 5 mg daily to 2.5 mg BID. Indication = afib. Due to age and SCr, requires dose reduction.     Please call pharmacy with any questions.    Savana InfanteD, BCPS   12/11/2024  10:15 AM      
      Pharmacy Renal Adjustment    Pharmacy renally adjusted the following medication(s) per P&T approved policy: famotidine    Recent Labs     12/10/24  0506 12/11/24 0440   BUN 34* 43*   CREATININE 1.2 1.8*     eGFR:   Recent Labs     12/09/24  2000 12/10/24  0506 12/11/24 0440   LABGLOM 74 60 37*     Estimated Creatinine Clearance: 36 mL/min (A) (based on SCr of 1.8 mg/dL (H)).    Assessment:  CRYSTAL    Plan:   Decrease famotidine from 20 mg BID to 20 mg daily    Please call pharmacy with any questions.    Kim Urban, PharmD, BCPS   12/11/2024  10:11 AM      
    Hospitalist Progress Note        Patient: Tato Cannon 84 y.o. male      Unit/Bed: -04/004-A    Admit Date: 12/9/2024      ASSESSMENT AND PLAN  Active Problems  Persistent hospital-acquired delirium-with intermittent agitated: Mentation has been waxing and waning during the entire hospital stay with paranoia, agitation, and disorientation being salient features. 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 He likely has some cognitive deficit at baseline as his son reports he gets confused when he misses sleep at home. He has been spitting and kicking in the middle of the night.  Interventions described below have not been successful in consistently removing the need for a sitter. 12/22 patient was agitated overnight and put in restraints. Patient has been sitter free for 72 hours. Psychiatry consulted; recommended stopping trazodone, Haldol and melatonin. Recommended starting Exelon daily patch and scheduling Seroquel every morning to let him sleep throughout the day.  Recommended Zyprexa as needed.   Delirium precautions   Continue Exelon 4.6 Mg patch transdermal daily  Continue Seroquel 25 Mg p.o. every morning  Continue Zyprexa 5 Mg IM 2 times daily as needed  Restraints as needed for harmful behavior not controlled with redirection  Precertification pending for SNF; patient needs to be sitter free for 24 hours prior to SNF placement   Psychiatry consulted; appreciate recommendations  Atrial fibrillation with RVR-significantly improved: Difficulties getting rate control during hospitalization. Patient was unable to tolerate amiodarone started with 100 mg Lopressor initially developed cold hands and feet suggestive of noncardiogenic shock. Is no better we will tolerate this in the current setting. Agitation at night has also been contributing to A-fib RVR. 12/23 patient appears to be in NSR on current regimen of rate control agents. Patient 
    Hospitalist Progress Note        Patient: Tato Cannon 84 y.o. male      Unit/Bed: -04/004-A    Admit Date: 12/9/2024      ASSESSMENT AND PLAN  Active Problems  Persistent hospital-acquired delirium-with intermittent agitated: Mentation has been waxing and waning during the entire hospital stay with paranoia, agitation, and disorientation being salient features. 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 He likely has some cognitive deficit at baseline as his son reports he gets confused when he misses sleep at home. He has been spitting and kicking in the middle of the night.  Interventions described below have not been successful in consistently removing the need for a sitter. 12/22 patient was agitated overnight and put in restraints. Patient has been sitter free for 72 hours. Psychiatry consulted; recommended stopping trazodone, Haldol and melatonin. Recommended starting Exelon daily patch and scheduling Seroquel every morning to let him sleep throughout the day.  Recommended Zyprexa as needed.   Delirium precautions   Continue Exelon 4.6 Mg patch transdermal daily  Continue Seroquel 25 Mg p.o. every morning  Continue Zyprexa 5 Mg IM 2 times daily as needed  Restraints as needed for harmful behavior not controlled with redirection  Precertification pending for SNF; patient needs to be sitter free for 24 hours prior to SNF placement   Psychiatry consulted; appreciate recommendations  resolved    Atrial fibrillation with RVR-significantly improved: Difficulties getting rate control during hospitalization. Patient was unable to tolerate amiodarone started with 100 mg Lopressor initially developed cold hands and feet suggestive of noncardiogenic shock. Is no better we will tolerate this in the current setting. Agitation at night has also been contributing to A-fib RVR. 12/23 patient appears to be in NSR on current regimen of rate control agents. 
    Hospitalist Progress Note        Patient: Tato Cannon 84 y.o. male      Unit/Bed: -04/004-A    Admit Date: 12/9/2024      ASSESSMENT AND PLAN  Active Problems  Persistent hospital-acquired delirium-with intermittent agitated: Mentation has been waxing and waning during the entire hospital stay with paranoia, agitation, and disorientation being salient features. 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 He likely has some cognitive deficit at baseline as his son reports he gets confused when he misses sleep at home. He has been spitting and kicking in the middle of the night.  Interventions described below have not been successful in consistently removing the need for a sitter. 12/22 patient was agitated overnight and put in restraints. Patient has been sitter free for 72 hours. Psychiatry consulted; recommended stopping trazodone, Haldol and melatonin. Recommended starting Exelon daily patch and scheduling Seroquel every morning to let him sleep throughout the day.  Recommended Zyprexa as needed.   Delirium precautions   Continue Exelon 4.6 Mg patch transdermal daily  Continue Seroquel 25 Mg p.o. every morning  Continue Zyprexa 5 Mg IM 2 times daily as needed  Restraints as needed for harmful behavior not controlled with redirection  Precertification pending for SNF; patient needs to be sitter free for 24 hours prior to SNF placement   Psychiatry consulted; appreciate recommendations  resolved    Atrial fibrillation with RVR-significantly improved: Difficulties getting rate control during hospitalization. Patient was unable to tolerate amiodarone started with 100 mg Lopressor initially developed cold hands and feet suggestive of noncardiogenic shock. Is no better we will tolerate this in the current setting. Agitation at night has also been contributing to A-fib RVR. 12/23 patient appears to be in NSR on current regimen of rate control agents. 
    Hospitalist Progress Note  Internal Medicine Resident      Patient: Tato Cannon 84 y.o. male      Unit/Bed: -04/004-A    Admit Date: 12/9/2024      ASSESSMENT AND PLAN  Active Problems  Persistent hospital-acquired delirium-with intermittent agitated: Mentation has been waxing and waning during the entire hospital stay with paranoia, agitation, and disorientation being salient features. 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 He likely has some cognitive deficit at baseline as his son reports he gets confused when he misses sleep at home. He has been spitting and kicking in the middle of the night.  Interventions described below have not been successful in consistently removing the need for a sitter. 12/22 patient was agitated overnight and put in restraints. Patient has been sitter free for 72 hours. Psychiatry consulted; recommended stopping trazodone, Haldol and melatonin. Recommended starting Exelon daily patch and scheduling Seroquel every morning to let him sleep throughout the day.  Recommended Zyprexa as needed.   Delirium precautions   Continue Exelon 4.6 Mg patch transdermal daily  Continue Seroquel 25 Mg p.o. every morning  Continue Zyprexa 5 Mg IM 2 times daily as needed  Restraints as needed for harmful behavior not controlled with redirection  Precertification pending for SNF; patient needs to be sitter free for 24 hours prior to SNF placement   Psychiatry consulted; appreciate recommendations  Atrial fibrillation with RVR-significantly improved: Difficulties getting rate control during hospitalization. Patient was unable to tolerate amiodarone started with 100 mg Lopressor initially developed cold hands and feet suggestive of noncardiogenic shock. Is no better we will tolerate this in the current setting. Agitation at night has also been contributing to A-fib RVR. 12/23 patient appears to be in NSR on current regimen of rate 
    Hospitalist Progress Note  Internal Medicine Resident      Patient: Tato Cannon 84 y.o. male      Unit/Bed: -06/006-A    Admit Date: 12/9/2024      ASSESSMENT AND PLAN  Active Problems  Atrial fibrillation with RVR-significantly improved: Difficulties getting rate control during hospitalization.  Patient was unable to tolerate amiodarone started with 100 mg Lopressor initially developed cold hands and feet suggestive of noncardiogenic shock.  Is no better we will tolerate this in the current setting.  Agitation at night has also been contributing to A-fib RVR.  Patient appears to be mildly bradycardic without RVR on current regimen of rate control agents.  Cardiology following  amiodarone 200 Mg p.o.,Continue Lopressor 100 Mg p.o. twice daily, digoxin 62.5 mcg p.o. daily, spironolactone 25 mg  Valsartan held to allow room for up titration of antiarrhythmics if needed  Continue heparin GTT; will transition to p.o. Eliquis when mentation improves pending goals of care    Persistent hospital-acquired delirium-with intermittent agitated: Mentation has been waxing and waning during the entire hospital stay with paranoia, agitation, and disorientation being salient features. 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 He likely has some cognitive deficit at baseline as his son reports he gets confused when he misses sleep at home.   He has been spitting and kicking in the middle of the night.  Interventions described below have not been successful in consistently removing the need for a sitter.  Delirium precautions  trazodone 50 Mg p.o, Seroquel 25 Mg p.o. nightly were ineffective.  As needed Haldol  Psychiatry was consulted for assistance with pharmacotherapy  As needed sitter for redirection  Restraints as needed for harmful behavior not controlled redirection  Precertification pending for SNF; patient needs to be sitter free for 24 hours prior to 
    Hospitalist Progress Note  Internal Medicine Resident      Patient: Tato Cannon 84 y.o. male      Unit/Bed: -06/006-A    Admit Date: 12/9/2024      ASSESSMENT AND PLAN  Active Problems  Atrial fibrillation with RVR: Patient has previous diagnosis of persistent atrial fibrillation.  He has been nonadherent with Eliquis dosing at home and likely nonadherent with rate control medication as well.  Patient has been sinus rhythm after multiple rounds of amiodarone drip.  There has been some PACs and ectopy on telemetry at times.  -Currently avoiding amiodarone and metoprolol at this time as patient's heart rate has generally been stable without it.    -Currently anticoagulated with heparin drip due to n.p.o. status.  If patient tolerates oral medications today then will transition back to Eliquis tomorrow if patient tolerates p.o. intake.  5 mg twice daily will likely be appropriate dose.  Patient appeared to be started on 2.5 mg twice daily when CRYSTAL was more severe.  -Recommend avoiding aggressive use of AV jamey blocking agents as patient developed bradycardia with elevated lactic acidosis and cold extremities on previous dosing of 100 mg twice daily metoprolol and amiodarone.  He has tolerated amiodarone drip as a single agent for rate control.  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 He likely has some cognitive deficit at baseline as his son reports he gets confused when he misses sleep at home.  Suspected pneumonia may also be contributing.  Patient's cognition today has significantly improved from previous days.  He was alert to person, events, time, and year, but still refused to believe that he was in the hospital.  He is much more alert and is able to tolerate oral intake.  We will likely be able 
    Hospitalist Progress Note  Internal Medicine Resident      Patient: Tato Cannon 84 y.o. male      Unit/Bed: -06/006-A    Admit Date: 12/9/2024      ASSESSMENT AND PLAN  Active Problems  Atrial fibrillation with RVR: Patient has previous diagnosis of persistent atrial fibrillation. He has been nonadherent with Eliquis dosing at home and likely nonadherent with rate control medication as well. Patient has been sinus rhythm after multiple rounds of amiodarone drip. There has been some PACs and ectopy on telemetry at times. 12/16 Amio drip discontinued at 3:29 AM; started on p.o. amiodarone at 9 AM. Heart rate 95. Cardiology consulted; recommended continue with with current amiodarone and metoprolol regimen. Recommending starting digoxin as well.   Amiodarone drip discontinued per cardiology  Continue amiodarone 200 Mg p.o. twice daily per cardiology; cardiology recommends keeping patient on beta-blocker and digoxin at discharge and stopping amiodarone  Continue Lopressor 25 Mg p.o. daily per cardiology  Continue digoxin 62.5 mcg p.o. daily per cardiology  Continue heparin GTT; will transition to p.o. Eliquis upon discharge and once heart rate is adequately controlled  Recommend avoiding aggressive use of AV jamey blocking agents as patient developed bradycardia with elevated lactic acidosis and cold extremities on previous dosing of 100 mg twice daily metoprolol and amiodarone.  He has tolerated amiodarone drip as a single agent for rate control.   Continue monitoring telemetry  Suspected pneumonia-significantly improved: Findings on chest CT suggestive of pneumonia. No significant leukocytosis. There is no obvious cough though patient has had some wheezing on exam. Patient is also had delirium or infection may be contributing etiology. WBC 10.1. Currently afebrile. Currently treating due to patient's significant comorbidities and high risk for deterioration if under pneumonia worsens. Course of Rocephin 
    Hospitalist Progress Note  Internal Medicine Resident      Patient: Tato Cannon 84 y.o. male      Unit/Bed: -06/006-A    Admit Date: 12/9/2024      ASSESSMENT AND PLAN  Active Problems  Atrial fibrillation with RVR: Patient has previous diagnosis of persistent atrial fibrillation. He has been nonadherent with Eliquis dosing at home and likely nonadherent with rate control medication as well. Patient has been sinus rhythm after multiple rounds of amiodarone drip. There has been some PACs and ectopy on telemetry at times. 12/18 heart rate 57; intermittent PACs and ectopy seen on telemetry. Valsartan unheld. Cardiology consulted; increase Bumex to 1 Mg twice daily and exhibited heart rate improvement. Increased Lopressor dose and recommended continuing Aldactone.   Continue amiodarone 200 Mg p.o. twice daily per cardiology; cardiology recommends keeping patient on beta-blocker and digoxin at discharge and stopping amiodarone  Continue Lopressor 50 Mg p.o. twice daily per cardiology  Continue digoxin 62.5 mcg p.o. daily per cardiology  Continue heparin GTT; will transition to p.o. Eliquis upon discharge and once heart rate is adequately controlled  Continue Bumex 1 Mg IV twice daily per cardiology  Continue Aldactone 25 Mg p.o. daily  Continue valsartan 20 Mg p.o. daily  Recommend avoiding aggressive use of AV jamey blocking agents as patient developed bradycardia with elevated lactic acidosis and cold extremities on previous dosing of 100 mg twice daily metoprolol and amiodarone. He has tolerated amiodarone drip as a single agent for rate control.   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 He likely has some cognitive deficit at baseline as his son reports he gets confused when he misses sleep at home. Suspected 
    Hospitalist Progress Note  Internal Medicine Resident      Patient: Tato Cannon 84 y.o. male      Unit/Bed: -06/006-A    Admit Date: 12/9/2024      ASSESSMENT AND PLAN  Active Problems  Atrial fibrillation with RVR: Patient has previous diagnosis of persistent atrial fibrillation. He has been nonadherent with Eliquis dosing at home and likely nonadherent with rate control medication as well. Patient has been sinus rhythm after multiple rounds of amiodarone drip. There has been some PACs and ectopy on telemetry at times.12/20 heart rate 84. Lopressor dose increased to 100 Mg twice daily. Cardiology consulted; IV Bumex changed to 1 Mg p.o. daily. Recommended holding ARB until rate controlled.  Recommended considering Farxiga upon discharge.   Continue amiodarone 200 Mg p.o. twice daily per cardiology; cardiology recommends keeping patient on beta-blocker and digoxin at discharge and stopping amiodarone  Continue Lopressor 100 Mg p.o. twice daily per cardiology  Continue digoxin 62.5 mcg p.o. daily per cardiology  Continue heparin GTT; will transition to p.o. Eliquis on 12/21  Continue Bumex 1 Mg p.o. daily per cardiology  Continue Aldactone 25 Mg p.o. daily  Continue holding valsartan 20 Mg p.o. daily per cardiology  Per cardiology recommending Farxiga upon discharge  Recommend avoiding aggressive use of AV jamey blocking agents as patient developed bradycardia with elevated lactic acidosis and cold extremities on previous dosing of 100 mg twice daily metoprolol and amiodarone. He has tolerated amiodarone drip as a single agent for rate control.   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 He likely has some cognitive deficit at baseline as his son reports he gets confused when he misses sleep at home. Suspected pneumonia may also be 
    Hospitalist Progress Note  Internal Medicine Resident      Patient: Tato Cannon 84 y.o. male      Unit/Bed: -06/006-A    Admit Date: 12/9/2024      ASSESSMENT AND PLAN  Active Problems  Symptomatic bradycardia (improving): Possibly due to cardiogenic shock secondary to to AV jamey blocking agents. Low suspicion for infectious etiology; no white count or fever. 12/11 appeared to decompensate and became bradycardic with heart rate 40s to 50s.  Blood pressure became soft; maps in 70s to 80s. Patient's upper and lower extremities were cool to touch. Patient received 500 mL fluid bolus and 5% albumin solution; MAP improved. Lopressor and amiodarone held. Pacer pads and atropine at bedside. Cardiology consulted; continue diuresis, keep amiodarone and Lopressor held. 12/20 HR currently 75. Patient extremities warm to touch today.  Continue holding Lopressor 100 Mg p.o. twice daily  Continue holding amiodarone drip  Continue Eliquis 2.5 Mg p.o. twice daily  Continue monitoring telemetry  Pacer pads and atropine at bedside  Trend vitals  Monitor for S/S of worsening atrial fibrillation or bradycardia/hypotension  Hospital-acquired delirium: Likely secondary to patient's insomnia/agitation and urinary retention. 12/12 patient was agitated and did not sleep overnight; was agitated on examination. A&Ox2.  Patient bladder scan showed 340 mL; patient was straight cathed. Patient fell asleep after straight cath and showed less agitation. CT head without contrast showed stable CT of brain. ABG showed pH 7.45, pCO2 30, pO2 84, HCO3 20. Per patient's son patient was history of walker and normally seems 12 hours per day; usually goes to bed around 6 AM.  Patient had pulido placed. Patient currently in restraints.  Continue with Pulido catheter  Continue to monitor alert and oriented this; as needed use of sedatives not advised  Repeat ABG in setting of worsening mentation  Encourage patient to sleep in the evening to improve 
    Hospitalist Progress Note  Internal Medicine Resident      Patient: Tato Cannon 84 y.o. male      Unit/Bed: -08/008-A    Admit Date: 12/9/2024      ASSESSMENT AND PLAN  Active Problems  Trauma by Fall: Multiple right-sided rib fractures with right pulmonary contusion, right apical pneumothorax. 12/8 patient reports pain on right-sided ribs while coughing; currently not in pain. No plans for acute intervention at this time; continuing with conservative management. Patient currently has Norco and morphine for pain.   Continue Norco 1 to 2 tablets p.o. every 4 hours as needed  Continue morphine 2-4 Mg IV every 2 hours as needed for breakthrough pain  Trend vitals  Monitor for S/S of worsening rib pain from fracture  Atrial Fibrillation; episode of bradycardia: Possibly secondary to Toprol/Lopressor doses. EKG initially showing A-fib with RVR, rate in the 150s. Appears at home patient is on Toprol-XL and Eliquis. Per prior documentation had previously been on amiodarone and was stopped due to elevated LFTs, also previously on digoxin. Patient was given bolus of diltiazem in ER. Patient was on amiodarone and Lopressor. Overnight patient became bradycardic with a heart rate in the 40s to 50s. Stat EKG ordered; showed marked sinus bradycardia with PACs; no AV block.patient converted to sinus rhythm; Lopressor and amiodarone held.  Pacer pads and atropine at bedside. Cardiology consulted; continue diuresis, keep amiodarone and Lopressor held. Patient currently in sinus bradycardia.  Continue holding Lopressor 100 Mg p.o. twice daily  Continue holding amiodarone drip  Continue Eliquis 2.5 Mg p.o. twice daily  Continue monitoring telemetry  Pacer pads and atropine at bedside  Trend vitals  Monitor for S/S of worsening atrial fibrillation or bradycardia/hypotension  Hyperkalemia: Likely secondary to valsartan and spironolactone use.  Potassium 5.3; recheck 6. Latest EKG shows sinus bradycardia. Valsartan and 
    [facility] Nurse  Progress Note       Tato Cannon  AGE: 84 y.o.   GENDER: male  : 1940  UNIT: 4K-06/006-A  TODAY'S DATE:  2024  ADMISSION DATE: 2024  7:34 PM    Subjective   Reason for [facility] Evaluation and Assessment: left leg wound      Tato Cannon is a 84 y.o. male referred by:   [] Physician/ Resident/ PA/ ANASTASIA-CNP  [x] Nursing  [] Other:     Wound Identification:  Wound Type:venous  Wound Location: left leg wound    Objective     Reji Risk Score: Reji Scale Score: 16      Assessment     Encounter: Present to pt room. Pt in bed, sitting up eating and talking to . Unable to check bottom at this time. Staff to follow wound care order sets. Wound photo, assessment and treatment recommendations below. Bed in low, call light in reach.      Wound 24 Leg Left;Mid;Inner Abscess (Active)   Wound Image   24 1349   Wound Etiology Venous 24 1349   Dressing Status New dressing applied 24 1349   Wound Cleansed Cleansed with saline 24 1349   Dressing/Treatment Foam 24 1349   Wound Length (cm) 1.2 cm 24 1349   Wound Width (cm) 1 cm 24 1349   Wound Depth (cm) 0.05 cm 24 1349   Wound Surface Area (cm^2) 1.2 cm^2 24 1349   Wound Volume (cm^3) 0.06 cm^3 24 1349   Wound Assessment Slough 24 1349   Drainage Amount Small (< 25%) 24 1349   Drainage Description Serosanguinous 24 1349   Odor None 24 1349   Anna Marie-wound Assessment Blanchable erythema 24 1349   Margins Attached edges 24 1349   Number of days: 1     Plan     Treatment Recommendations:   Left leg wound: Cleanse wound with normal saline or wound cleanser and gauze. Pat dry with clean gauze. Applied bordered foam to area. Change every 2 days and as needed.    Pressure Injury Prevention:   [x] Support Surface: hercules  [] Turned with wedges   [] Turned with pillows  [] Offloading boots   [] No depends  [] Keshawnx 
    [facility] Nurse  Progress Note       Tato Cannon  AGE: 84 y.o.   GENDER: male  : 1940  UNIT: 7K-04/004-A  TODAY'S DATE:  2024  ADMISSION DATE: 2024  7:34 PM    Subjective   Reason for [facility] Evaluation and Assessment: buttock, right heel, left lower inner leg       Tato Cannon is a 84 y.o. male referred by:   [] Physician/ Resident/ PA/ ANASTASIA-CNP  [x] Nursing  [] Other:     Wound Identification:  Wound Type:venous  Wound Location: BLE    Objective     Reji Risk Score: Reji Scale Score: 17      Assessment     Encounter: Present to pt room. Pt in bed. Wound photo, assessment and treatment recommendations below. Speech incomprehensible. Dressings removed from right heel and left posterior lower leg. Cleansed wounds with normal saline and gauze. Pat dry with clean gauze. Patient has what appears to be chronic venous ulcers. Bordered foam dressing applied to right heel, opticel Ag applied to left leg wound, covered with bordered foam dressing. Assisted patient onto right side. Patient has MASD to sacrum and coccyx. Triad applied. Patient floated on pillows. Restraints re-secured. Bed in low, call light in reach. Will follow. Call with concerns.    Wound Care Documentation:  Wound 12/10/24 Sacrum MASD (Active)   Wound Image   24 1149   Wound Etiology Other 24 1149   Dressing Status Old drainage noted;New dressing applied 24 1149   Wound Cleansed Cleansed with saline 24 1149   Dressing/Treatment Triad hydro/zinc oxide-based hydrophilic paste 24 1149   Dressing Change Due 24 1149   Wound Assessment Pink/red;Denuded 24 1149   Drainage Amount Small (< 25%) 24 1149   Drainage Description Serosanguinous 24 1149   Odor None 24 1149   Anna Marie-wound Assessment Blanchable erythema 24 1149   Margins Attached edges 24 1149   Number of days: 13       Wound 24 Leg Left;Mid;Inner (Active)   Wound 
 [facility]  INPATIENT PHYSICAL THERAPY  DAILY NOTE  [facility] ORTHOPEDICS 7K - 7K-04/004-A      Discharge Recommendations: Subacte/Skilled Nursing Facility  Equipment Recommendations: No               Time In: 1005  Time Out: 1028  Timed Code Treatment Minutes: 23 Minutes  Minutes: 23          Date: 2024  Patient Name: Tato Cannon,  Gender:  male        MRN: [id number]  : 1940  (84 y.o.)     Referring Practitioner: Tiffanie Casillas APRN - CNP  Diagnosis: Fall down steps, initial encounter  Additional Pertinent Hx: Per chart reivew: This is a pleasant 84 y.o. male with PMHx of HTN, HLD, hard of hearing, HFrEF, chronic A-fib s/p DCCV, hypothyroidism presents after a fall, but given patient's past medical history cardiologist consulted.  Patient is noncompliant with his medication as prescribed, he takes Eliquis once a day rather than twice, and takes Bumex twice a day rather than once.  Patient denies any chest pain, SOB, palpitations, dizziness, LOC, weakness, or sensory changes.     Prior Level of Function:  Lives With: Son  Home Layout: One level  Home Access: Stairs to enter without rails  Entrance Stairs - Number of Steps: 3  Entrance Stairs - Rails: None  Home Equipment: Walker - Rolling   Bathroom Accessibility: Accessible    Receives Help From: Family  Prior Level of Assist for ADLs: Independent  Active : Yes  Additional Comments: Patient states he has a walker but only uses it occasionally. patient poor historian and no family present during eval.  Has the patient had two or more falls in the past year or any fall with injury in the past year?: Yes    Restrictions/Precautions:  Restrictions/Precautions: Fall Risk, General Precautions  Position Activity Restriction  Spinal Precautions: No Bending, No Lifting, No Twisting  Other Position/Activity Restrictions: R rib fractures, L4 compression fracture, mildy thick liquids, very [place]     SUBJECTIVE: RN ok'ed session. Pt was 
 [facility]  INPATIENT PHYSICAL THERAPY  DAILY NOTE  [facility] ICU STEPDOWN TELEMETRY 4K - 4K-06006-A      Discharge Recommendations: Continue to assess pending progress, Subacte/Skilled Nursing Facility, and Patient would benefit from continued PT at discharge  Equipment Recommendations: No               Time In: 0838  Time Out: 0903  Timed Code Treatment Minutes: 25 Minutes  Minutes: 25          Date: 2024  Patient Name: Tato Cannon,  Gender:  male        MRN: [id number]  : 1940  (84 y.o.)     Referring Practitioner: Tiffanie Casillas APRN - CNP  Diagnosis: Fall down steps, initial encounter  Additional Pertinent Hx: Per chart reivew: This is a pleasant 84 y.o. male with PMHx of HTN, HLD, hard of hearing, HFrEF, chronic A-fib s/p DCCV, hypothyroidism presents after a fall, but given patient's past medical history cardiologist consulted.  Patient is noncompliant with his medication as prescribed, he takes Eliquis once a day rather than twice, and takes Bumex twice a day rather than once.  Patient denies any chest pain, SOB, palpitations, dizziness, LOC, weakness, or sensory changes.     Prior Level of Function:  Lives With: Son  Home Layout: One level  Home Access: Stairs to enter without rails  Entrance Stairs - Number of Steps: 3  Entrance Stairs - Rails: None  Home Equipment: Walker - Rolling   Bathroom Accessibility: Accessible    Receives Help From: Family  Prior Level of Assist for ADLs: Independent  Active : Yes  Additional Comments: Patient states he has a walker but only uses it occasionally. patient poor historian and no family present during eval.  Has the patient had two or more falls in the past year or any fall with injury in the past year?: Yes    Restrictions/Precautions:  Restrictions/Precautions: Fall Risk, General Precautions  Position Activity Restriction  Spinal Precautions: No Bending, No Lifting, No Twisting  Other Position/Activity Restrictions: R rib 
.[facility]  OCCUPATIONAL THERAPY MISSED TREATMENT NOTE  [facility] ORTHOPEDICS 7K  7K-004-A      Date: 2024  Patient Name: Tato Cannon        CSN: [id number]   : 1940  (84 y.o.)  Gender: male   Referring Practitioner: ROSE Cornell            REASON FOR MISSED TREATMENT:  pt was just transferred from 4K to 7K   is in restraints and sleeping.  Unable to awaken at this time to part in therapy.   Will attempt again tomorrow as time allows                 
.[facility]  OCCUPATIONAL THERAPY MISSED TREATMENT NOTE  [facility] ICU STEPDOWN TELEMETRY 4K  4K--A      Date: 2024  Patient Name: Tato Cannon        CSN: [id number]   : 1940  (84 y.o.)  Gender: male   Referring Practitioner: ROSE Cornell            REASON FOR MISSED TREATMENT: Hold Treatment per Nursing stated pt not stable at this time for therapy.   Will check back tomorrow as time allows               
0800 - care assumed - pt. In bed - no needs expressed.  0830 - assessment per flowsheet - needs provided - pt. Calm, cooperative - restraints released - to chair   [id number] - pt. Remains cooperative - somewhat impulsive - \"pockettalker\" acquired and seems to improve communication - assessment/care per flowsheet.  1200 - 1430 - Pt. Progressively more impulsive - POC/teaching ongoing. Jose Ramon Minaya to unit - is updated, poc discussed, Dr. Mcadams involved in converstaion.  1430 - 1630 - Granddaughters to visit (Shun & Zoey); are updated; POC is discussed extensively, teaching and support provided - acknowledged as understood and appreciated. Pt. Is oriented x4 but disoriented/confused at times and is more impulsive than earlier.  1700 - 1930 - extensive discussions with Koko cuellar and arnie Albright re: POC, options and code status - both agreeable - pt. Would not want intubation - pm RN to pass on for change to Ltd x4 - family will follow up tomorrow  
1013  Attempted trauma services SBIRT screening. Pt with medical staff providing pt care. FELICITA to re-attempt.   
1913  Trauma SBIRT attempted, pt hard of hearing, answering questions inappropriately. Clinician asked pts name, pt stated \"you got pop?\".  Clinician attempted several times to test pts orientation however unsuccessful.  FELICITA to re-attempt.  
2642--SW attempted to complete Trauma SBIRT. Two RN's were in the pt's room. Sw advised SW trying to complete consult. RN stated they're kind of worried about pt right now; FELICITA to continue following.   
[facility]  SPEECH THERAPY  [facility] ICU STEPDOWN TELEMETRY 4K  Clinical Swallow Evaluation    Discharge Recommendations: SNF  DIET ORDER RECOMMENDATIONS AFTER EVALUATION: NPO with exceptions for ice chips following comprehensive oral care with nursing staff only - Considerations for NGT  Strategies:  Recommend NPO and Oral Care Q2H      SLP Individual Minutes  Time In: 946  Time Out: 954  Minutes: 8  Timed Code Treatment Minutes: 0 Minutes       Date: 2024  Patient Name: Tato Cannon      CSN: [id number]   : 1940  (84 y.o.)  Gender: male   Referring Physician:  Angelika Mata DO     Diagnosis: Fall down steps, initial encounter    History of Present Illness/Injury: Patient admitted with above diagnosis. Per chart review, \"Tato is an 84 year old male that presented to the Emergency Department via EMS for evaluation of injuries sustained in a fall down steps last night. He reports that he fell down his porch steps. States that he hit his head, denies any loss of consciousness. Also hit his right chest wall. Was able to lay on the couch through the night but due to increasing pain, he came for evaluation. Found to have multiple right sided rib fractures and a small right apical pneumothorax. Also reports that he has progressively become more swollen in his legs over the course of the last 2 months and family at the bedside states that the patient's breathing has become more labored at rest. Patient admits to being hospitalized this year for \"an infection in the lower legs\" and relates weeping lower extremities from the infection. Patient verbalizes concern that he has a parasite from this infection.\"     MBS completed on 12/10/2024 revealed a MOD I oral dysphagia and a mild pharyngeal dysphagia with recommendations for a regular texture diet and thin liquids - no additional skilled ST services warranted. On 2024, ST re-consulted to completed clinical swallow evaluation 
[facility]  INPATIENT SPEECH THERAPY  [facility] ICU STEPDOWN TELEMETRY 4K  DAILY NOTE    Discharge Recommendations: SNF    SLP Individual Minutes  Time In: 1228  Time Out: 1242  Minutes: 14  Timed Code Treatment Minutes: 0 Minutes       Date: 2024  Patient Name: Tato Cannon      CSN: [id number]   : 1940  (84 y.o.)  Gender: male   Referring Physician: Sid Deras MD   Diagnosis: Fall down steps, initial encounter  Precautions: Fall risk, aspiration precautions  Current Diet: Regular diet and mildly thick liquids   Respiratory Status: Nasal Canula: 4LPM  Swallowing Strategies:   Full Upright Position, Small Bite/Sip, No Straw, Multiple Swallow, Pulmonary Monitoring, Alternate Solids and Liquids, Limit Distractions, and Monitor for Fatigue    Date of Last MBS/FEES:  24    Pain:  No pain reported.    Subjective:  Session approved by RNLucia. Patient seen upright in bed. Alert and cooperative. Severely [place].     Short-Term Goals:  SHORT TERM GOAL #1:  Goal 1: Patient will complete functional immediate/delayed recall tasks wtih 70% accuracy mod cues to improve retention of pertinent, novel information r/t daily living requirements.  INTERVENTIONS: DNT d/t focus on dysphagia     SHORT TERM GOAL #2:  Goal 2: Patient will complete problem solving, verbal/visual reasoning, and executive functioning tasks (medication management) with 70% accuracy mod cues to enhance success within ADLs/IADLs.  INTERVENTIONS: DNT d/t focus on dysphagia     SHORT TERM GOAL #3:  Goal 3: Patient will complete sequencing and thought organization tasks with 70% accuracy mod cues to improve mental flexibility for home scenarios.  INTERVENTIONS: DNT d/t focus on dysphagia     SHORT TERM GOAL #4:  Goal 4: Patient will complete structured attention tasks with no more than 3 errors until task completion to permit potential return to multi-tasking, IADLs, and driving.  INTERVENTIONS:DNT d/t focus on dysphagia 
[facility]  Trauma Surgery - Dr. Ashley Escobar   Daily Progress Note  Pt Name: Tato Cannon  Medical Record Number: [id number]  Date of Birth 1940   Today's Date: 12/11/2024    HD: # 2    CC:  Chest wall pain     ASSESSMENT  1.  Active Hospital Problems    Diagnosis Date Noted    Closed fracture of multiple ribs of right side [S22.41XA] 12/10/2024    Pneumothorax on right [J93.9] 12/10/2024    Pleural effusion, bilateral [J90] 12/10/2024    Right pulmonary contusion [S27.321A] 12/10/2024    Compression fracture of L4 vertebra (HCC) [S32.040A] 12/10/2024    Fall [W19.XXXA] 12/10/2024    Fall down steps, initial encounter [W10.8XXA] 12/09/2024         PLAN  Admit to 4K under Trauma Services   - 12/10: Transferred attending to the hospitalist service   - 12/11: Trauma signing off of case     Trauma by fall              - Fall precautions              - PT, OT eval and treat     Multiple right sided rib fractures with right pulmonary contusion              - CT of the chest indicates posterior right-sided rib fractures involving ribs 5, 6, 7, 8, 9 and 10 with consolidation of the right lower lobe              - Rib fracture protocol              - Lidoderm patches              - IS, C&DB              - Continuous pulse ox              - Pain control              - Repeat CXR in the AM   - 12/10: Repeat CXR still in progress. Patient reports increased pain with movement but denies pain while resting in bed. Currently on room air (93-98%). Continue aggressive pulmonary toilet.    - 12/11: CXR yesterday noted a small right pleural effusion with mild dependent right basilar atelectasis or pneumonia.  Aeration of the right lung base is slightly worsened from prior examination.  Rib fx protocol continues with patient achieving NIF of -40 cm H2O and FVC of 1.25 liters.       Right apical pneumothorax              - CT chest notes a trace right apical pneumothorax              - Current size is small 
[facility]  OCCUPATIONAL THERAPY MISSED TREATMENT NOTE  [facility] ORTHOPEDICS 7K  7K-04/004-A      Date: 2024  Patient Name: Tato Cannon        CSN: [id number]   : 1940  (84 y.o.)  Gender: male   Referring Practitioner: ROSE Cornell            REASON FOR MISSED TREATMENT:  OT treatment attempted Pt. Just receiving lunch tray will re-attempt as time allows.                
Cardiology Progress Note      Patient:  Tato Cannon  YOB: 1940  MRN: [id number]   Acct: [id number]  Admit Date:  12/9/2024  Primary Cardiologist: Kavin العراقي MD  Seen by Dr. العراقي    Per prior cardiology consult note-  CHIEF COMPLAINT: Fall        HPI: This is a pleasant 84 y.o. male with PMHx of HTN, HLD, hard of hearing, HFrEF, chronic A-fib s/p DCCV, hypothyroidism presents after a fall, but given patient's past medical history cardiologist consulted.  Patient is noncompliant with his medication as prescribed, he takes Eliquis once a day rather than twice, and takes Bumex twice a day rather than once.  Patient denies any chest pain, SOB, palpitations, dizziness, LOC, weakness, or sensory changes.       Subjective (Events in last 24 hours):   Pt in bed     Very [place]    Brother at bedside - updated - questions answered     Pt denies SOB or CP   Sill with LE swelling  L>R  Tele SR HR 50's  Bp stable   Diuresed >950     12/12/24  Overnight pt was worsened with confusion - attempting to hit and climb OOB     HR has remained stable  - 70's today   BP stable       12/13/24  Overnight HR up - pt fighting with staff - when resting HR SR 50-60  BP stable     Still confused - likely normal state of mind     Pt without co       12/16/24  Pt delirium much improved   Still with RVR episodes - pt asymptomatic   BP hinders HR control     Pt denies chest pain or SOB or dizziness or palpitations       12/17/24  Pt confused   HR remains AFB -120    Trailing diuresis per attending -- u\o 1900 so far today !!      12/18/24  Pt sleeping   NAD   Tele HR improving with diuresis - AFB HR 32816 at present     BP stable   Diuresed 2600 ml / last 24 hrs     Pt still rowdy and confused especially at night re: haldol       Objective:   /85   Pulse 90   Temp 97.9 °F (36.6 °C) (Oral)   Resp 20   Ht 1.753 m (5' 9\")   Wt 95.6 kg (210 lb 12.2 oz)   SpO2 94%   BMI 31.12 kg/m²        TELEMETRY: AFB 
Cardiology Progress Note      Patient:  Tato Cannon  YOB: 1940  MRN: [id number]   Acct: [id number]  Admit Date:  12/9/2024  Primary Cardiologist: Kavin Gresham MD    Note per dr gresham \"CHIEF COMPLAINT: Fall        HPI: This is a pleasant 84 y.o. male with PMHx of HTN, HLD, hard of hearing, HFrEF, chronic A-fib s/p DCCV, hypothyroidism presents after a fall, but given patient's past medical history cardiologist consulted.  Patient is noncompliant with his medication as prescribed, he takes Eliquis once a day rather than twice, and takes Bumex twice a day rather than once.  Patient denies any chest pain, SOB, palpitations, dizziness, LOC, weakness, or sensory changes.\"    Subjective (Events in last 24 hours): pt confused, hard of hearing.  NAD. No cp or sob.    On RA  On heparin gtt    Net I/o -4.7 L    Objective:   /86   Pulse (!) 103   Temp 97.4 °F (36.3 °C) (Axillary)   Resp 18   Ht 1.753 m (5' 9\")   Wt 95.6 kg (210 lb 12.2 oz)   SpO2 93%   BMI 31.12 kg/m²        TELEMETRY: afib    Physical Exam:  General Appearance: alert and oriented to person, place and time, in no acute distress  Cardiovascular: irregularly irregular  Pulmonary/Chest: clear to auscultation bilaterally- no wheezes, rales or rhonchi, normal air movement, no respiratory distress  Abdomen: soft, non-tender, non-distended, normal bowel sounds, no masses Extremities: +1 ble edema, pulse   Skin: warm and dry, no rash or erythema  Head: normocephalic and atraumatic  Eyes: pupils equal, round, and reactive to light  Neck: supple and non-tender without mass, no thyromegaly       Medications:    metoprolol tartrate  75 mg Oral BID    traZODone  50 mg Oral Nightly    bumetanide  1 mg IntraVENous BID    melatonin  6 mg Oral Nightly    valsartan  20 mg Oral Daily    thiamine  100 mg Oral Daily    spironolactone  25 mg Oral Daily    digoxin  62.5 mcg Oral Daily    amiodarone  200 mg Oral Daily    famotidine  20 mg Oral Daily 
Cardiology Progress Note      Patient:  Tato Cannon  YOB: 1940  MRN: [id number]   Acct: [id number]  Admit Date:  12/9/2024  Primary Cardiologist: Kavin Gresham MD    Note per dr gresham \"CHIEF COMPLAINT: Fall        HPI: This is a pleasant 84 y.o. male with PMHx of HTN, HLD, hard of hearing, HFrEF, chronic A-fib s/p DCCV, hypothyroidism presents after a fall, but given patient's past medical history cardiologist consulted.  Patient is noncompliant with his medication as prescribed, he takes Eliquis once a day rather than twice, and takes Bumex twice a day rather than once.  Patient denies any chest pain, SOB, palpitations, dizziness, LOC, weakness, or sensory changes.\"    Subjective (Events in last 24 hours):   Pt with confusion and aggressive behavior this am, given haldol  Upon my evaluation, awake but confused.  NAD. No cp or sob. No edema or orthopnea     Meds given late today due to his confusion    On RA  On heparin gtt    Net I/o -5.3 L    Objective:   /69   Pulse (!) 102   Temp 98.1 °F (36.7 °C) (Axillary)   Resp 18   Ht 1.753 m (5' 9\")   Wt 95.6 kg (210 lb 12.2 oz)   SpO2 94%   BMI 31.12 kg/m²        TELEMETRY: afib     Physical Exam:  General Appearance: alert and oriented to person, place and time, in no acute distress  Cardiovascular: irregularly irregular  Pulmonary/Chest: clear to auscultation bilaterally- no wheezes, rales or rhonchi, normal air movement, no respiratory distress  Abdomen: soft, non-tender, non-distended, normal bowel sounds, no masses Extremities: trace ble  edema, pulse   Skin: warm and dry, no rash or erythema  Head: normocephalic and atraumatic  Eyes: pupils equal, round, and reactive to light  Neck: supple and non-tender without mass, no thyromegaly       Medications:    QUEtiapine  25 mg Oral Nightly    traZODone  50 mg Oral Nightly    metoprolol tartrate  100 mg Oral BID    tamsulosin  0.4 mg Oral Daily    bumetanide  1 mg IntraVENous BID 
Cardiology Progress Note      Patient:  Tato Cannon  YOB: 1940  MRN: [id number]   Acct: [id number]  Admit Date:  12/9/2024  Primary Cardiologist: Kavin العراقي MD  Seen by Dr. العراقي    Per prior cardiology consult note-  CHIEF COMPLAINT: Fall        HPI: This is a pleasant 84 y.o. male with PMHx of HTN, HLD, hard of hearing, HFrEF, chronic A-fib s/p DCCV, hypothyroidism presents after a fall, but given patient's past medical history cardiologist consulted.  Patient is noncompliant with his medication as prescribed, he takes Eliquis once a day rather than twice, and takes Bumex twice a day rather than once.  Patient denies any chest pain, SOB, palpitations, dizziness, LOC, weakness, or sensory changes.       Subjective (Events in last 24 hours):   Pt in bed     Very [place]    Brother at bedside - updated - questions answered     Pt denies SOB or CP   Sill with LE swelling  L>R  Tele SR HR 50's  Bp stable   Diuresed >950     12/12/24  Overnight pt was worsened with confusion - attempting to hit and climb OOB     HR has remained stable  - 70's today   BP stable       Objective:   /68   Pulse 72   Temp 98.1 °F (36.7 °C) (Axillary)   Resp 24   Ht 1.753 m (5' 9\")   Wt 80.1 kg (176 lb 9.4 oz)   SpO2 97%   BMI 26.08 kg/m²        TELEMETRY: SR HR 70's    Physical Exam:  General Appearance: alert and oriented to person,  in no acute distress    [place]  Cardiovascular: normal rate, regular rhythm, normal S1 and S2, no murmurs, rubs, clicks, or gallops, distal pulses intact,   Pulmonary/Chest: clear upper - diminished posterior bases 1/2 up to auscultation bilaterally- no wheezes, rales or rhonchi, normal air movement, no respiratory distress  Abdomen: soft, non-tender, non-distended, normal bowel sounds, no masses Extremities: no cyanosis, clubbing --> 3-4+ L>R pitting LE edema, pulses present    Skin: warm and dry, no rash or erythema  Musculoskeletal: normal range of motion, no joint swelling, 
Cardiology Progress Note      Patient:  Tato Cannon  YOB: 1940  MRN: [id number]   Acct: [id number]  Admit Date:  12/9/2024  Primary Cardiologist: Kavin العراقي MD  Seen by Dr. العراقي    Per prior cardiology consult note-  CHIEF COMPLAINT: Fall        HPI: This is a pleasant 84 y.o. male with PMHx of HTN, HLD, hard of hearing, HFrEF, chronic A-fib s/p DCCV, hypothyroidism presents after a fall, but given patient's past medical history cardiologist consulted.  Patient is noncompliant with his medication as prescribed, he takes Eliquis once a day rather than twice, and takes Bumex twice a day rather than once.  Patient denies any chest pain, SOB, palpitations, dizziness, LOC, weakness, or sensory changes.       Subjective (Events in last 24 hours):   Pt in bed     Very [place]    Brother at bedside - updated - questions answered     Pt denies SOB or CP   Sill with LE swelling  L>R  Tele SR HR 50's  Bp stable   Diuresed >950     12/12/24  Overnight pt was worsened with confusion - attempting to hit and climb OOB     HR has remained stable  - 70's today   BP stable       12/13/24  Overnight HR up - pt fighting with staff - when resting HR SR 50-60  BP stable     Still confused - likely normal state of mind     Pt without co       12/16/24  Pt delirium much improved   Still with RVR episodes - pt asymptomatic   BP hinders HR control     Pt denies chest pain or SOB or dizziness or palpitations       12/17/24  Pt confused   HR remains AFB -120    Trailing diuresis per attending -- u\o 1900 so far today !!      12/18/24  Pt sleeping   NAD   Tele HR improving with diuresis - AFB HR 92486 at present     BP stable   Diuresed 2600 ml / last 24 hrs     Pt still rowdy and confused especially at night re: haldol     12/21/24  Pt is sedated - he is fighting against his restraints while sleeping - doesn't follow commands     Tele SR HR 50's  Bp stable     Objective:   BP (!) 152/72   Pulse 55   Temp 97.7 °F 
Cardiology Progress Note      Patient:  Tato Cannon  YOB: 1940  MRN: [id number]   Acct: [id number]  Admit Date:  12/9/2024  Primary Cardiologist: Kavin العراقي MD  Seen by Dr. العراقي    Per prior cardiology consult note-  CHIEF COMPLAINT: Fall        HPI: This is a pleasant 84 y.o. male with PMHx of HTN, HLD, hard of hearing, HFrEF, chronic A-fib s/p DCCV, hypothyroidism presents after a fall, but given patient's past medical history cardiologist consulted.  Patient is noncompliant with his medication as prescribed, he takes Eliquis once a day rather than twice, and takes Bumex twice a day rather than once.  Patient denies any chest pain, SOB, palpitations, dizziness, LOC, weakness, or sensory changes.       Subjective (Events in last 24 hours):   Pt in bed     Very [place]    Brother at bedside - updated - questions answered     Pt denies SOB or CP   Sill with LE swelling  L>R  Tele SR HR 50's  Bp stable   Diuresed >950       Objective:   /79   Pulse 59   Temp 97.3 °F (36.3 °C) (Axillary)   Resp 18   Ht 1.753 m (5' 9\")   Wt 98.6 kg (217 lb 6 oz)   SpO2 95%   BMI 32.10 kg/m²        TELEMETRY: SR HR 50's    Physical Exam:  General Appearance: alert and oriented to person, place and time, in no acute distress    [place]  Cardiovascular: normal rate, regular rhythm, normal S1 and S2, no murmurs, rubs, clicks, or gallops, distal pulses intact,   Pulmonary/Chest: clear upper - diminished posterior bases 1/2 up to auscultation bilaterally- no wheezes, rales or rhonchi, normal air movement, no respiratory distress  Abdomen: soft, non-tender, non-distended, normal bowel sounds, no masses Extremities: no cyanosis, clubbing --> 3-4+ L>R pitting LE edema, pulses present    Skin: warm and dry, no rash or erythema  Musculoskeletal: normal range of motion, no joint swelling, deformity or tenderness  Neurological: alert, oriented, normal speech, no focal findings or movement disorder 
Cardiology Progress Note      Patient:  Tato Cannon  YOB: 1940  MRN: [id number]   Acct: [id number]  Admit Date:  12/9/2024  Primary Cardiologist: Kavin العراقي MD  Seen by Dr. العراقي    Per prior cardiology consult note-  CHIEF COMPLAINT: Fall        HPI: This is a pleasant 84 y.o. male with PMHx of HTN, HLD, hard of hearing, HFrEF, chronic A-fib s/p DCCV, hypothyroidism presents after a fall, but given patient's past medical history cardiologist consulted.  Patient is noncompliant with his medication as prescribed, he takes Eliquis once a day rather than twice, and takes Bumex twice a day rather than once.  Patient denies any chest pain, SOB, palpitations, dizziness, LOC, weakness, or sensory changes.       Subjective (Events in last 24 hours):   Pt in bed     Very [place]    Brother at bedside - updated - questions answered     Pt denies SOB or CP   Sill with LE swelling  L>R  Tele SR HR 50's  Bp stable   Diuresed >950     12/12/24  Overnight pt was worsened with confusion - attempting to hit and climb OOB     HR has remained stable  - 70's today   BP stable       12/13/24  Overnight HR up - pt fighting with staff - when resting HR SR 50-60  BP stable     Still confused - likely normal state of mind     Pt without co       12/16/24  Pt delirium much improved   Still with RVR episodes - pt asymptomatic   BP hinders HR control     Pt denies chest pain or SOB or dizziness or palpitations       Objective:   /85   Pulse (!) 130   Temp 97.9 °F (36.6 °C) (Oral)   Resp 16   Ht 1.753 m (5' 9\")   Wt 96.2 kg (212 lb 1.3 oz)   SpO2 94%   BMI 31.32 kg/m²        TELEMETRY: SR HR 50-60's    Physical Exam:  General Appearance: alert and oriented to person,  in no acute distress    [place]  Cardiovascular: normal rate, regular rhythm, normal S1 and S2, no murmurs, rubs, clicks, or gallops, distal pulses intact,   Pulmonary/Chest: clear upper - diminished posterior bases 1/2 up to auscultation 
Chart review for SBIRT per trauma service. Per [facility], pt is \"confused and unable to follow directions\" and \", patient tried to kick sitter and this RN. He was spitting out his saliva as well.\". Pt not appropriate at this time. FELICITA to complete when pt is appropriative.   
[facility]  PHYSICAL THERAPY MISSED TREATMENT NOTE  [facility] 7K    Date: 2024  Patient Name: Tato Cannon        MRN: [id number]   : 1940  (84 y.o.)  Gender: male   Referring Practitioner: Tiffanie Casillas APRN - CNP            REASON FOR MISSED TREATMENT:  Missed Treat.  RN ok'ed session however voiced that pt is in soft restraints due to confusion throughout the night and pulling on oxygen and catheter. Upon arrival patient was laying in bed and awake. Pt was intermittently confused throughout conversation and when asked to complete therapy pt voices\" I do not want to do therapy before chanda\". Education on benefits of therapy however pt continued to refuse. PT to attempt to see at next available date as time allows and as willing.       
Comprehensive Nutrition Assessment    Type and Reason for Visit:  Initial, LOS    Nutrition Recommendations/Plan:   Consider Low sodium ( CHF diet) as appropriate.  Pt declines ONS.   Consider MVI as appropriate     Malnutrition Assessment:  Malnutrition Status:  At risk for malnutrition (12/17/24 1423)    Context:  Acute Illness     Findings of the 6 clinical characteristics of malnutrition:  Energy Intake:  Mild decrease in energy intake (0% at lunch today however other po 51-75% , %, unable to get a diet hx pt very [place])  Weight Loss:  No weight loss     Body Fat Loss:  No body fat loss     Muscle Mass Loss:  No muscle mass loss    Fluid Accumulation:  Unable to assess     Strength:  Not Performed    Nutrition Assessment:  Pt. nutritionally compromised AEB wounds.  At risk for further nutrition compromise r/t increased nutrient needs for wound healing, admit with trauma by fall,  right sided rib fracturewith right pulmonary contusion, right apical pneumothorax, compression fracture, bilateral effusion, acute exacerbation CHF, Atrial Fib with RVR , suspected pneumonia, CRYSTAL ( resolved) underlying medical condition (Afib, [place], CHF, HLD, HLD, HTN).          Nutrition Related Findings:    Pt. Report/Treatments/Miscellaneous: Pt seen, from home with son, appears [place].Lidia pt had low sodium diet education by our staff here (2/3/24). He mentions food has been good but wasn't hungry at lunch today & did not eat it & declines ONS. He mentions he ate most of his breakfast except the scrambled eggs.  \" I'm getting tired of it\". I notice previous intake noted was % po appears to vary. Pt unable to give a diet hx. He denies chewing/swallowing difficulty on current diet. SLP MBS (12/14) regular diet with mildly thick liquids no straws.  Discussed lean protein choices in the diet with each meal to aid with wound healing. Pt doesn't appear to have had wt loss. Pt mentions he adds \" a little salt to foods\". I gave 
Comprehensive Nutrition Assessment    Type and Reason for Visit:  Reassess    Nutrition Recommendations/Plan:   Consider MVI  Diet as per Speech Therapy/ Physician  ONS: Magic Cup TID     Malnutrition Assessment:  Malnutrition Status:  At risk for malnutrition (12/17/24 1423)    Context:  Acute Illness     Findings of the 6 clinical characteristics of malnutrition:  Energy Intake:  Mild decrease in energy intake (0% at lunch today however other po 51-75% , %, unable to get a diet hx pt very [place])  Weight Loss:  Unable to assess (CHF)     Body Fat Loss:  No body fat loss     Muscle Mass Loss:  No muscle mass loss    Fluid Accumulation:  Unable to assess     Strength:  Not Performed    Nutrition Assessment:     Pt. with improvement from a nutritional standpoint AEB good acceptance of oral nutrition supplement, variable meal intake.  Remains at risk for further nutritional compromise r/t increased nutrient needs for wound healing, admit with trauma by fall,  right sided rib fracturewith right pulmonary contusion, right apical pneumothorax, compression fracture, bilateral effusion, acute exacerbation CHF, Atrial Fib with RVR , suspected pneumonia, CRYSTAL (resolved) underlying medical condition (Afib, [place], CHF, HLD, HLD, HTN).       Nutrition Related Findings:    Pt. Report/Treatments/Miscellaneous: Patient seen eating magic cup and note good acceptance of ONS, note variable meal intake and periodically missing meals due to sleeping  GI Status: BM 12/23  Pertinent Labs: glucose (12/25) 83  Pertinent Meds: bumex, pepcid, glycolax, thiamine      Wound Type:  (sacrum MASD; venous right heel and left leg)       Current Nutrition Intake & Therapies:    Average Meal Intake: 0%, 51-75%, %  Average Supplements Intake: % (per patient)  ADULT DIET; Regular; Mildly Thick (Nectar); No Drinking Straws  ADULT ORAL NUTRITION SUPPLEMENT; Breakfast, Lunch, Dinner; Frozen Oral Supplement    Anthropometric 
Comprehensive Nutrition Assessment    Type and Reason for Visit:  Reassess    Nutrition Recommendations/Plan:   Continue diet per SLP/provider and encourage adequate PO intake at best efforts.   Start ONS: Magic cups (TID)   Will monitor need for additional nutrition interventions.   RD 12/17 educated on low sodium diet     Malnutrition Assessment:  Malnutrition Status:  At risk for malnutrition (12/17/24 1423)    Context:  Acute Illness     Findings of the 6 clinical characteristics of malnutrition:  Energy Intake:  Mild decrease in energy intake (0% at lunch today however other po 51-75% , %, unable to get a diet hx pt very [place])  Weight Loss:  Unable to assess (CHF)     Body Fat Loss:  No body fat loss     Muscle Mass Loss:  No muscle mass loss    Fluid Accumulation:  Unable to assess     Strength:  Not Performed    Nutrition Assessment:     Pt. with no improvement from a nutritional standpoint AEB pt sleeping through some meals; inadequate PO intake.  Remains at risk for further nutritional compromise r/t increased nutrient needs for wound healing, admit with trauma by fall,  right sided rib fracturewith right pulmonary contusion, right apical pneumothorax, compression fracture, bilateral effusion, acute exacerbation CHF, Atrial Fib with RVR , suspected pneumonia, CRYSTAL ( resolved) underlying medical condition (Afib, [place], CHF, HLD, HLD, HTN).       Nutrition Related Findings:    Pt. Report/Treatments/Miscellaneous: Pt seen, heavily sleeping - RN in room stating pt is very difficult to arouse this morning. He did wake up to gentle touch but quickly fell back asleep. Breakfast tray untouched; has been periodically missing and sleeping through meals. Will start magic cups in hopes to get some ONS in.   GI Status: BM - 12/20; constipation; hypoactive BS  Pertinent Labs: Reviewed BMP from 12/23  Pertinent Meds: bumex IV, pepcid, synthroid, melatonin, glycolax, seroquel, spironolactone, flomax, thiamine, 
Discharge teaching and instructions for diagnosis/procedure of Fall down steps, initial encounter completed with patient using teachback method. AVS reviewed. Printed prescriptions given to patient. Patient voiced understanding regarding prescriptions, follow up appointments, and care of self at home. Discharged via cart/stretcher to  skilled nursing per  LACP  . Report called over to nursing facility. Patient's vital signs stable.   
During this encounter, patient is confused and unable to follow command. Patient has a sitter in the room but has no family members present.Patient is awake but unable to follow command and appeared confused. Prayer is offered at patient's bedside and ministry of present is provided.  Patient has a DNR-CCA code status with tow of his children listed as POA/Decision Maker. Patient has no ACP document in medical record. Spiritual Health staff will follow up with continue emotional support as needed.   
[facility]  INPATIENT SPEECH THERAPY  [facility] ICU STEPDOWN TELEMETRY 4K  DAILY NOTE    Discharge Recommendations: SNF    SLP Individual Minutes  Time In: 1000  Time Out: 1010  Minutes: 10  Timed Code Treatment Minutes: 0 Minutes       Date: 2024  Patient Name: Tato Cannon      CSN: [id number]   : 1940  (84 y.o.)  Gender: male   Referring Physician:  Angelika Mata DO     Diagnosis: Fall down steps, initial encounter  Precautions: Fall risk, aspiration precautions   Current Diet: NPO; no source of nutrition   Respiratory Status: Nasal Canula: 2LPM  Swallowing Strategies: Not Applicable  Date of Last MBS/FEES:  12/10/24    Pain:  No pain reported.    Subjective:  Session approved by RN, Diane. Patient seen resting in bed. Required tactile stimuli to awaken (sternal rub) given patient significantly [place]. Fluctuating alertness; however, improved as the session progressed. Fully alert and cooperative. Fluctuating dysarthria. Highly influenced by fatigue.     Short-Term Goals:  SHORT TERM GOAL #1:  Goal 1: Patient will complete functional immediate/delayed recall tasks wtih 70% accuracy mod cues to improve retention of pertinent, novel information r/t daily living requirements.  INTERVENTIONS: DNT d/t focus on dysphagia     SHORT TERM GOAL #2:  Goal 2: Patient will complete problem solving, verbal/visual reasoning, and executive functioning tasks (medication management) with 70% accuracy mod cues to enhance success within ADLs/IADLs.  INTERVENTIONS: DNT d/t focus on dysphagia     SHORT TERM GOAL #3:  Goal 3: Patient will complete sequencing and thought organization tasks with 70% accuracy mod cues to improve mental flexibility for home scenarios.  INTERVENTIONS:DNT d/t focus on dysphagia     SHORT TERM GOAL #4:  Goal 4: Patient will complete structured attention tasks with no more than 3 errors until task completion to permit potential return to multi-tasking, IADLs, and 
[facility]  PHYSICAL THERAPY MISSED TREATMENT NOTE  [facility] 7K    Date: 2024  Patient Name: Tato Cannon        MRN: [id number]   : 1940  (84 y.o.)  Gender: male   Referring Practitioner: Tiffanie Casillas APRN - CNP            REASON FOR MISSED TREATMENT:  Hold Treatment per Nursing. RN reporting, pt is lethargic and unable to alert to therapeutic level. PT will hold and check back as able.         
I have independently performed an evaluation on Tato . I have reviewed the   documentation completed by the Sigrid Anaya APRN - CNP     I personally saw and examined the patient     Total time spent 20minutes.  Time could have been discontiguous.  Time does not include procedures.  Time does include my direct assessment of the patient and coordination of care.        Patient in no distress.  Patient is on supplemental oxygen to hide help with small pneumothorax.  Repeat chest x-ray about the same as yesterday.  Does have worse aeration in the right lower base.  Patient currently being treated for CHF exacerbation.  Trauma will continue to follow along however feel that majority of patient's problems are more medical exacerbations.  Will transition patient over to the medical service.  Will continue to follow with serial chest x-rays and aggressive pulmonary toilet.  Overall pulmonary contusion likely to be its worst at day 5-7.  Will continue to monitor continue aggressive pulmonary toilet.    Electronically signed by Ashley Escobar MD on 12/10/2024 at 5:38 PM  [facility]  Trauma Surgery - Dr. Ashley Escobar   Daily Progress Note  Pt Name: Tato Cannon  Medical Record Number: [id number]  Date of Birth 1940   Today's Date: 12/10/2024    HD: # 1    CC:  Pain with movement     ASSESSMENT  1.  Active Hospital Problems    Diagnosis Date Noted    Closed fracture of multiple ribs of right side [S22.41XA] 12/10/2024    Pneumothorax on right [J93.9] 12/10/2024    Pleural effusion, bilateral [J90] 12/10/2024    Right pulmonary contusion [S27.321A] 12/10/2024    Compression fracture of L4 vertebra (HCC) [S32.040A] 12/10/2024    Fall down steps, initial encounter [W10.8XXA] 12/09/2024         PLAN  Admit to 4K under Trauma Services     Trauma by fall              - Fall precautions              - PT, OT eval and treat     Multiple right sided rib fractures with right pulmonary contusion      
[facility]  OCCUPATIONAL THERAPY MISSED TREATMENT NOTE  [facility] ICU STEPDOWN TELEMETRY 4K  4K-006-A      Date: 2024  Patient Name: Tato Cannon        CSN: [id number]   : 1940  (84 y.o.)  Gender: male   Referring Practitioner: ROSE Cornell            REASON FOR MISSED TREATMENT:  patient with PT upon OT arrival. OT to check back as able and time allows.                
[facility]  [facility] 7K  Occupational Therapy  Daily Note    Discharge Recommendations: Subacute/skilled nursing facility  Equipment Recommendations: Yes         Time In: 0755  Time Out: 0812  Timed Code Treatment Minutes: 17 Minutes  Minutes: 17          Date: 2024  Patient Name: Tato Cannon,   Gender: male      Room: [facility]004-A  MRN: [id number]  : 1940  (84 y.o.)  Referring Practitioner: ROSE Cornell  Diagnosis: Fall down steps, initial encounter  Additional Pertinent Hx: per chart review; Tato Cannon84 y.o. male who we are asked to see/evaluate by Ashley Escobar MD for medical management of CHF exacerbation and Afib with RVR.  Patient very hard of hearing.  Patient reports he had a fall last night.  Per ER documentation family states the patient fell down 3 porch stairs.  Patient does tell me he has had increased swelling in his legs that has caused difficulty with ambulation.  He follows with Dr. Romero in the heart failure clinic.  States that he was admitted in the summer for heart failure exacerbation and had been doing well up until the end of October.  States since October he has had increased lower extremity edema.  Has had weeping edema.  In the ER was found to have rib fractures and admitted to trauma service.  Hospitalist consulted for medical management of chronic conditions as well as CHF exacerbation and A-fib with RVR.  Upon exam, patient resting in bed.  Denies any cardiac chest pain or shortness of breath.  Does endorse right lateral chest wall/rib pain.  States this is where he fell.    Restrictions/Precautions:  Restrictions/Precautions: Fall Risk, General Precautions  Position Activity Restriction  Spinal Precautions: No Bending, No Lifting, No Twisting  Other Position/Activity Restrictions: R rib fractures, L4 compression fracture, mildy thick liquids, very [place]      Social/Functional History:  Lives With: Son  Home Layout: One 
[facility]  INPATIENT SPEECH THERAPY  [facility] ICU STEPDOWN TELEMETRY 4K  DAILY NOTE    Discharge Recommendations: Home Health vs SNF    SLP Individual Minutes  Time In: 1538  Time Out: 1549  Minutes: 11  Timed Code Treatment Minutes: 11 Minutes       Date: 2024  Patient Name: Tato Cannon      CSN: [id number]   : 1940  (84 y.o.)  Gender: male   Referring Physician:  Ashley Escobar MD   Diagnosis: Fall down steps, initial encounter  Precautions: Aspiration risk, Fall Risk  Current Diet: NPO - downgraded on 2024 d/t decline in overall mentation  Respiratory Status: Nasal Canula: 2 L/min  Swallowing Strategies:  Full Upright Position, Small Bite/Sip, and Limit Distractions    Date of Last MBS/FEES:  12/10/2024    Pain:  No pain reported.    Subjective:  RN Agnes approved completion of skilled ST services. RN Agnes reporting dietary downgrade to NPO given significant decline in overall mentation. Patient awake in bed with live sitter at bedside upon ST arrival. Patient awake in bed; however, highly tangential and non-sensical speech. Presence of moderate-severe dysarthria with speech intelligibility approximating ~50% at the conversational speech.     Repeat CT head pending at time of skilled ST services.     Short-Term Goals:  SHORT TERM GOAL #1:  Goal 1: Patient will complete functional immediate/delayed recall tasks wtih 70% accuracy mod cues to improve retention of pertinent, novel information r/t daily living requirements.  INTERVENTIONS:   Biographical Information  Name - independent    - independent   Age - independent   Address -independent   Name of Daughter: independent     Orientation  Place -  unable to elicit despite max cues   Year - Max visual cues with utilization of daily calendar   Month - Mod cues with utilization of daily calendar   DELMY - Max cues with utilization of daily calendar   Date - Mod cues with utilization of daily calendar       SHORT TERM GOAL 
[facility]  INPATIENT OCCUPATIONAL THERAPY  STR[facility] ICU STEPDOWN TELEMETRY 4K  EVALUATION      Discharge Recommendations: Long Term Care with OT, 24 hour supervision or assist  Equipment Recommendations: Yes        Time In: 1031  Time Out: 1055  Timed Code Treatment Minutes: 15 Minutes  Minutes: 24          Date: 2024  Patient Name: Tato Cannon,   Gender: male      MRN: [id number]  : 1940  (84 y.o.)  Referring Practitioner: ROSE Cornell  Diagnosis: Fall down steps, initial encounter  Additional Pertinent Hx: per chart review; Tato Cannon84 y.o. male who we are asked to see/evaluate by Ashley Escobar MD for medical management of CHF exacerbation and Afib with RVR.  Patient very hard of hearing.  Patient reports he had a fall last night.  Per ER documentation family states the patient fell down 3 porch stairs.  Patient does tell me he has had increased swelling in his legs that has caused difficulty with ambulation.  He follows with Dr. Romero in the heart failure clinic.  States that he was admitted in the summer for heart failure exacerbation and had been doing well up until the end of October.  States since October he has had increased lower extremity edema.  Has had weeping edema.  In the ER was found to have rib fractures and admitted to trauma service.  Hospitalist consulted for medical management of chronic conditions as well as CHF exacerbation and A-fib with RVR.  Upon exam, patient resting in bed.  Denies any cardiac chest pain or shortness of breath.  Does endorse right lateral chest wall/rib pain.  States this is where he fell.    Restrictions/Precautions:  Restrictions/Precautions: Fall Risk, General Precautions  Position Activity Restriction  Spinal Precautions: No Bending, No Lifting, No Twisting  Other Position/Activity Restrictions: R rib fractures, L4 compression fracture    Subjective  Chart Reviewed: Yes, Orders, Progress Notes, History and Physical, 
[facility]  SPEECH THERAPY  [facility] ICU STEPDOWN TELEMETRY 4K  Speech - Language - Cognitive Evaluation + Clinical Swallow Evaluation    Discharge Recommendations: Continue to Assess Pending Progress  DIET ORDER RECOMMENDATIONS AFTER EVALUATION: NPO pending MBS completion  STRATEGIES: Strategies pending MBS completion     SLP Individual Minutes  Time In: 08  Time Out: 902  Minutes: 24  Timed Code Treatment Minutes: 0 Minutes     Speech, Language, Cognitive Evaluation: 14  Clinical Swallow Evaluation: 10    Date: 12/10/2024  Patient Name: Tato Cannon      CSN: [id number]   : 1940  (84 y.o.)  Gender: male   Referring Physician:  Ashley Escobar MD  Diagnosis: Fall down steps, initial encounter  Precautions: [place], fall risk, aspiration risk   History of Present Illness/Injury:   Tato is an 84 year old male that presented to the Emergency Department via EMS for evaluation of injuries sustained in a fall down steps last night.  He reports that he fell down his porch steps.  States that he hit his head, denies any loss of consciousness.  Also hit his right chest wall.  Was able to lay on the couch through the night but due to increasing pain, he came for evaluation.  Found to have multiple right sided rib fractures and a small right apical pneumothorax.  Also reports that he has progressively become more swollen in his legs over the course of the last 2 months and family at the bedside states that the patient's breathing has become more labored at rest.  Patient admits to being hospitalized this year for \"an infection in the lower legs\" and relates weeping lower extremities from the infection.  Patient verbalizes concern that he has a parasite from this infection.   Past Medical History:   Diagnosis Date    A-fib (HCC)     CHF (congestive heart failure) (HCC)     HLD (hyperlipidemia)     [place] (hard of hearing)     Hearing aids    Hyperlipidemia     Hypertension        Pain: No pain 
Order for urine sample placed yesterday. Resident requested this urine sample get done, if possible. Patient combative and unable to follow commands. Straight cath done per this RN and sample sent to lab this morning. During straight cath, 900ml was emptied from bladder. Patient calmed down and slept after this. Later in the afternoon, patient became agitated and combative again. Bladder scan completed and 340ml was documented. This RN reached out to the resident Esperanza to inform him of results. Dr. Mcadams came to bedside and requested a pulido be placed instead of straight cath with the thought that some of the agitation was coming from the urinary retention. After placement of the pulido, patient calmed down enough to remove the restraints for most of the afternoon.   
Patient in restraints with sitter at bedside. Patient is unable to state name, date of birth, current year, or location. Patient only intermittently following commands. Verbal okay by resident and attending to not give morning medications  
[facility]  OCCUPATIONAL THERAPY MISSED TREATMENT NOTE  [facility] ORTHOPEDICS 7K  7K-04/004-A      Date: 2024  Patient Name: Tato Cannon        CSN: [id number]   : 1940  (84 y.o.)  Gender: male   Referring Practitioner: ROSE Cornell            REASON FOR MISSED TREATMENT: Hold Treatment per Nursing. RN reporting, pt is lethargic and unable to alert to therapeutic level. OT will hold and check back as able.               
Per chart review for SBIRT per trauma service, pt is in restraints and not following commands. Pt not appropriate. FELICITA to complete when pt is appropriate.   
Primary RN COLLEEN with another pt to CT Scan.  Tech called pod RN to pt's room due to low O2 sat.  Pt placed on 2L NC by pod RN, pt satting 86-88% on RA.  
[facility]  SPEECH THERAPY  STR[facility] ICU STEPDOWN TELEMETRY 4K  Modified Barium Swallow    Discharge Recommendations: Continue to Assess Pending Progress  DIET ORDER RECOMMENDATIONS AFTER EVALUATION: Regular and thin  Strategies:  Full Upright Position, Small Bite/Sip, and Limit Distractions     SLP Individual Minutes  Time In: 1005  Time Out: 1016  Minutes: 11  Timed Code Treatment Minutes: 0 Minutes       Date: 12/10/2024  Patient Name: Tato Cannon      CSN: [id number]   : 1940  (84 y.o.)  Gender: male   Referring Physician:  Ashley Escobar MD  Diagnosis: Bilateral pleural effusion [J90]  Traumatic pneumothorax, initial encounter [S27.0XXA]  Fall down steps, initial encounter [W10.8XXA]  Fall, initial encounter [W19.XXXA]  Closed fracture of multiple ribs of right side, initial encounter [S22.41XA]  Atrial fibrillation, unspecified type (HCC) [I48.91]    Precautions: [place], fall risk, aspiration risk   History of Present Illness/Injury:   History of Present Illness/Injury:   Tato is an 84 year old male that presented to the Emergency Department via EMS for evaluation of injuries sustained in a fall down steps last night.  He reports that he fell down his porch steps.  States that he hit his head, denies any loss of consciousness.  Also hit his right chest wall.  Was able to lay on the couch through the night but due to increasing pain, he came for evaluation.  Found to have multiple right sided rib fractures and a small right apical pneumothorax.  Also reports that he has progressively become more swollen in his legs over the course of the last 2 months and family at the bedside states that the patient's breathing has become more labored at rest.  Patient admits to being hospitalized this year for \"an infection in the lower legs\" and relates weeping lower extremities from the infection.  Patient verbalizes concern that he has a parasite from this infection.    has a past medical history of 
Pt admitted to  4K8 from ED.   Complaints: Falls at Home.      IV sites free of s/s of infection or infiltration.   Vital signs obtained. Assessment and data collection initiated.   Two nurse skin assessment performed by Kristian STEWARD RN and Shun HESS RN. See LDA for Wound Assessment.    Oriented to room. Policies and procedures for  explained. All questions answered with no further questions at this time. Fall prevention and safety brochure discussed with patient.  Bed alarm on. Call light in reach.      Swallow Screening done at Bedside, patient was able to complete and pass (See \"Head to Toe\" in \"Flowsheets\" for details)    
Pt admitted to  7 via bed from 4k 06.     Complaints: None.      Heparin infusing into the wrist left, condition patent and no redness at a rate of 14.4 mls/ hour with about 200 mls in the bag still. IV site free of s/s of infection or infiltration. Vital signs obtained. Assessment and data collection initiated.     Two nurse skin assessment performed by Silvia HANNAH and Jasmina EL. Oriented to room.     Explained patients right to have family, representative or physician notified of their admission.  Patient has N/A for physician to be notified.  Patient has N/A for family/representative to be notified.    The patient is interested in [facility]’s meds to beds program?:  No    Policies and procedures for  explained. All questions answered with no further questions at this time. Fall prevention and safety brochure discussed with patient.  Bed alarm on. Call light in reach.       
Pt is unable to perform incentive spirometry.   
[facility]  OCCUPATIONAL THERAPY MISSED TREATMENT NOTE  STRZ ICU STEPDOWN TELEMETRY 4K  4K-006-A      Date: 2024  Patient Name: Tato Cannon        CSN: [id number]   : 1940  (84 y.o.)  Gender: male   Referring Practitioner: ROSE Cornell            REASON FOR MISSED TREATMENT: Hold Treatment per Nursing patient currently in restraints at this time, not following commands.  Will attempt next available time when appropriate.               
Respiratory Care is following the rib fracture order set  Pt busy with nursing  
Respiratory Care is following the rib fracture order set.  Pt in xray at this time.   
Respiratory Care is following the rib fracture order set. Patient is confused and unable to follow directions to properly perform FVC maneuver. The NIF was attempted and patient achieved > -40 cwp. Patient achieved 1750mL with incentive spirometer. Goal with IS for pt height and age is roughly 2230mL. Last pain meds given 12/10/24 @ 0020. This [facility] perfect served Tiffanie Casillas NP regarding test.   
Respiratory Care is following the rib fracture order set. Patient's position when testing was done was HF.  A Negative Inspiratory Force (NIF) was performed with patient achieving a NIF of -40 cm H2O. The NIF was greater than 25 cm H2O. A Forced Vital Capacity (FVC) was obtained with patient achieving an FVC of 1.25 liters. The patient's calculated ideal body weight, (IBW) is  71 kg. 0.020 liters/kg of the patient's IBW is 1.42 liters. The patient's FVC was less than 0.020 liters/kg of IBW. Based on the spirometry measurement alone, patient does not meet ICU admission criteria. Previous FVC was 1st attempt liters and previous NIF was 1st attempt cm H2O.  Last pain medication was given on  12.09. Physician was not called regarding spirometry measurement.    Pt is very [place] and does not have good technique with testing even with multiple attempts at instruction and demonstrations. Pt can not follow directions well enough to get a good reliable/reproducible effort.   
Ronni called for help and this RN seen patient almost on the edge of the bed while sitter is trying to prevent him from falling. While repositioning him back, patient tried to kick ronni and this RN. He was spitting out his saliva as well. RN reached out to Dr. Lam if we can put restraints on patient. Restraints ordered for safety.   
SBIRT screening completed per trauma protocol. SBIRT Findings are Negative.  Patient denies alcohol and/or drug use. Also denies depressive symptoms.    Brief Intervention and Referral to Treatment Summary N/A    Patient was provided PHQ-9, AUDIT-C and DAST Screening:      PHQ-9 Score:  Negative  AUDIT-C Score:  Negative  DAST Score:   Negative    Patient’s substance use is considered-Negative    Low Risk/Healthy  Moderate Risk  Harmful  Dependent    Patient’s depression is considered:-Negative    Minimal  Mild   Moderate  Moderately Severe  Severe    Brief Education Was Provided N/A    Patient was receptive  Patient was not receptive      Brief Intervention Is Provided (Only for AUDIT-C or DAST) N/A    Patient reports readiness to decrease and/or stop use and a plan was discussed   Patient denies readiness to decrease and/or stop use and a plan was not discussed    Injured Trauma Survivor Screening  1.  When you were injured or right afterward   Did you think you were going to die? RESPONSES; YES+1/NO: NO  Do you think this was done to you intentionally? NO    Since your injury  Have you felt more restless, tense or jumpy than usual? RESPONSES; YES+1/NO: NO  Have you found yourself unable to stop worrying? RESPONSES; YES+1/NO: NO  Do you find yourself thinking that the world is unsafe and that people are not to be trusted? RESPONSES; YES+1/NO: NO    TOTAL SCORE from ITSS Questions 1 and 2:     NOTE: A score of greater than or equal to 2 is considered positive for PTSD risk and is to receive a community resource packet to link with appropriate providers.    Recommendations/Referrals for Brief and/or Specialized Treatment Provided to Patient  N/A    
[facility]  PHYSICAL THERAPY MISSED TREATMENT NOTE  STRZ ICU STEPDOWN TELEMETRY 4K    Date: 12/10/2024  Patient Name: Tato Cannon        MRN: [id number]   : 1940  (84 y.o.)  Gender: male                REASON FOR MISSED TREATMENT:  Missed Treat.      Patient in a-fib currently and therefore will withhold PT evaluation.  Will check back as able and medically appropriate.       
[facility]  OCCUPATIONAL THERAPY MISSED TREATMENT NOTE  [facility] ORTHOPEDICS 7K  7K-04/004-A      Date: 2024  Patient Name: Tato Cannon        CSN: [id number]   : 1940  (84 y.o.)  Gender: male   Referring Practitioner: ROSE Cornell            REASON FOR MISSED TREATMENT:  Patient sleeping in bed upon arrival; minimal verbal stimulation to alert.  Patient difficult to orient to participation with OT as patient demonstrates tangential conversation, difficult to follow directions as well as patient falling asleep mid conversation.  Not appropriate at this time for therapy.  Will attempt next available time.                
Spiritual Health History and Assessment/Progress Note  [facility]    (P) Spiritual/Emotional Needs,  ,  ,      Name: Tato Cannon MRN: [id number]    Age: 84 y.o.     Sex: male   Language: English   Faith: Other   Fall down steps, initial encounter     Date: 12/26/2024            Total Time Calculated: (P) 15 min              Spiritual Assessment continued in [facility] ORTHOPEDICS 7K        Referral/Consult From: (P) Rounding   Encounter Overview/Reason: (P) Spiritual/Emotional Needs  Service Provided For: (P) Patient    Agnes, Belief, Meaning:   Patient unable to assess at this time  Family/Friends No family/friends present      Importance and Influence:  Patient has no beliefs influential to healthcare decision-making identified during this visit  Family/Friends No family/friends present    Community:  Patient feels well-supported. Support system includes: Children  Family/Friends No family/friends present    Assessment and Plan of Care:     Patient Interventions include: Facilitated expression of thoughts and feelings and Affirmed coping skills/support systems  Family/Friends Interventions include: No family/friends present    Patient Plan of Care: Spiritual Care available upon further referral  Family/Friends Plan of Care: No family/friends present    Electronically signed by Chaplain Blayne on 12/26/2024 at 8:33 AM    
Spoke with BRIELLE Severino about patient's rib fx protocol. Patient not cooperative at this time to perform.   
[facility]  OCCUPATIONAL THERAPY MISSED TREATMENT NOTE  STRZ ICU STEPDOWN TELEMETRY 4K  4K-008-A      Date: 12/10/2024  Patient Name: Tato Cannon        CSN: [id number]   : 1940  (84 y.o.)  Gender: male                REASON FOR MISSED TREATMENT:  patient off floor upon OT arrival. OT to check back as able and time allows.                
[facility]  SPEECH THERAPY  STRZ ICU STEPDOWN TELEMETRY 4K  Modified Barium Swallow    Discharge Recommendations: SNF  DIET ORDER RECOMMENDATIONS AFTER EVALUATION: Regular diet and mildly thick liquids   Strategies:  Full Upright Position, Small Bite/Sip, No Straw, Multiple Swallow, Pulmonary Monitoring, Alternate Solids and Liquids, Limit Distractions, and Monitor for Fatigue     SLP Individual Minutes  Time In: 1050  Time Out: 1101  Minutes: 11  Timed Code Treatment Minutes: 0 Minutes       Date: 2024  Patient Name: Tato Cannon      CSN: [id number]   : 1940  (84 y.o.)  Gender: male   Referring Physician:  Sid Deras MD  Diagnosis: Bilateral pleural effusion [J90]  Traumatic pneumothorax, initial encounter [S27.0XXA]  Fall down steps, initial encounter [W10.8XXA]  Fall, initial encounter [W19.XXXA]  Closed fracture of multiple ribs of right side, initial encounter [S22.41XA]  Atrial fibrillation, unspecified type (HCC) [I48.91]    Precautions: Fall risk, aspiration precautions   History of Present Illness/Injury: Patient admitted with above diagnosis. Per chart review, \"Tato is an 84 year old male that presented to the Emergency Department via EMS for evaluation of injuries sustained in a fall down steps last night. He reports that he fell down his porch steps. States that he hit his head, denies any loss of consciousness. Also hit his right chest wall. Was able to lay on the couch through the night but due to increasing pain, he came for evaluation. Found to have multiple right sided rib fractures and a small right apical pneumothorax. Also reports that he has progressively become more swollen in his legs over the course of the last 2 months and family at the bedside states that the patient's breathing has become more labored at rest. Patient admits to being hospitalized this year for \"an infection in the lower legs\" and relates weeping lower extremities from the infection. 
TARUMA SBIRT attempt. Patient currently sleeping at this time. FELICITA SW to attempt at a later time  
TRAUMA SBIRT attempt. Patient currently not oriented at this time. FELICITA SW to attempt at a later time  
TRAUMA SBIRT attempt. Patient currently with staff at this time. FELICITA SW to attempt at a later time  
This RN called son Silas Cuencafernando to inform about the patient's condition that lead to soft bilateral wrist restraints. Son understands the situation. All his questions are answered.   
This pt is not able to perform IS or Rib fx protocol at this time. Pt combative and not following commands. BRIELLE Hallman  aware this tech was at pt's room for Rib fx protocol.  
Throughout the night patient has became increasing agitation and hallucinations. Patient became agitated and attempted to get out of bed. Two techs attempted to reorient patient and get patient back into bed. Then this RN saw that patient started kicking and hitting the 2 before mentioned techs and went to assist. This RN and BRIELLE Severino went to assist the before mentioned techs. While attempting to get the patient back into bed the patient started hitting and kicking all of the before mentioned employees. Patient then started spitting at all people involved and an order for bilateral wrist restraints was obtained from a resident doctor. IM halidol was given to patient as well per resident.   
[facility]  STRZ ICU STEPDOWN TELEMETRY 4K  Occupational Therapy  Daily Note    Discharge Recommendations: Subacute/skilled nursing facility  Equipment Recommendations: Yes         Time In: 0758  Time Out: 0824  Timed Code Treatment Minutes: 26 Minutes  Minutes: 26          Date: 2024  Patient Name: Tato Cannon,   Gender: male      Room: [address] [place] [address]  MRN: [id number]  : 1940  (84 y.o.)  Referring Practitioner: ROSE Cornell  Diagnosis: Fall down steps, initial encounter  Additional Pertinent Hx: per chart review; Tato Cannon84 y.o. male who we are asked to see/evaluate by Ashley Escobar MD for medical management of CHF exacerbation and Afib with RVR.  Patient very hard of hearing.  Patient reports he had a fall last night.  Per ER documentation family states the patient fell down 3 porch stairs.  Patient does tell me he has had increased swelling in his legs that has caused difficulty with ambulation.  He follows with Dr. Romero in the heart failure clinic.  States that he was admitted in the summer for heart failure exacerbation and had been doing well up until the end of October.  States since October he has had increased lower extremity edema.  Has had weeping edema.  In the ER was found to have rib fractures and admitted to trauma service.  Hospitalist consulted for medical management of chronic conditions as well as CHF exacerbation and A-fib with RVR.  Upon exam, patient resting in bed.  Denies any cardiac chest pain or shortness of breath.  Does endorse right lateral chest wall/rib pain.  States this is where he fell.    Restrictions/Precautions:  Restrictions/Precautions: Fall Risk, General Precautions  Position Activity Restriction  Spinal Precautions: No Bending, No Lifting, No Twisting  Other Position/Activity Restrictions: R rib fractures, L4 compression fracture      Social/Functional History:  Lives With: Son  Home Layout: One level  Home Access: Stairs 
level  Home Access: Stairs to enter without rails  Entrance Stairs - Number of Steps: 3  Entrance Stairs - Rails: None  Home Equipment: Walker - Rolling   Bathroom Accessibility: Accessible    Receives Help From: Family  Prior Level of Assist for ADLs: Independent  Has the patient had two or more falls in the past year or any fall with injury in the past year?: Yes    Active : Yes  Occupation: Retired  Leisure & Hobbies: likes to stay active with yard work.  Additional Comments: Patient states he has a walker but only uses it occasionally. patient poor historian and no family present during eval.    SUBJECTIVE: alert in bed, confused with RN in room upon arrival of therapist. Pt disoriented to time of day-reorientation provided. Upon looking out the window Pt reports \"oh, I'm in the hospital\"    PAIN: no number given/10: pain in R ribs with mobility    Vitals: Nurse checked vitals prior to session    COGNITION: Slow Processing, Decreased Insight, Impaired Memory, Inattention, Decreased Problem Solving, Decreased Safety Awareness, and very [place] making it difficult to follow commands    ADL:   Grooming: Minimal Assistance.  Washed face while seated in recliner, min A & vcs for sequencing brushing dentures  Footwear Management: Dependent.    .    IADL:   Not Tested    BALANCE:  Sitting Balance:  Contact Guard Assistance.    Standing Balance: Contact Guard Assistance.      BED MOBILITY:  Supine to Sit: Moderate Assistance, X 1 Pt prefers to long sit then turn & scoot legs over    TRANSFERS:  Sit to Stand:  Minimal Assistance, X 2. From EOB  Stand to Sit: Minimal Assistance. X 1 to recliner; vcs for UE placement    FUNCTIONAL MOBILITY:  Assistive Device: Rolling Walker  Assist Level:  Minimal Assistance.   Distance:  3ft to recliner  Vcs & A for maneuvering walker     AM-PAC Inpatient Daily Activity Raw Score: 12  AM-PAC Inpatient ADL T-Scale Score : 30.6  ADL Inpatient CMS 0-100% Score: 66.57    Modified [place] 
return pt to bed at end of session, pt slightly confused during session he was very [place] and needed me to repeat and demonstration during session      PAIN: 0/10:       Vitals: pt SOB and noted some labored breathing throughout session pts O2 was > 98% and HR in 80's     OBJECTIVE:  Bed Mobility:  Rolling to Left: Minimal Assistance, with head of bed raised, with rail, with increased time for completion   Rolling to Right: Moderate Assistance   Supine to Sit: Moderate Assistance, with head of bed raised, with rail, with increased time for completion  Sit to Supine: Maximum Assistance, then needing max assist to reposition hips and shouldes and dependent to boost up inb ed    Scooting: Moderate Assistance    Transfers:  Sit to Stand: Minimal Assistance  Stand to Sit:Minimal Assistance  ** pt completed multiple sit to stand transfers, he needed cues for hand placement and very wide base of support as he struggled to get feet on the inside of walker - pt unsteady w/ initial standing balance   Ambulation:  See pre gait and side stepping     Stairs:  Not Tested    Balance:  Dynamic sitting at edge of bed pt completed reaching task out of base of support w/ SBA he was limited w/ UE reach and noted edema in ethan UE more so right vs left   Pt stood w/ ethan UE at support completed pre-gait activity with wt shifting and controlled stepping/tapping to target with min assist pt demonstrated flexed posture with heavy reliance on the walker noted very wide base and had difficulty keeping LEs inside the walker, pt only advance foot forward and back w/ minimal movement and lacking heel contact - he did complete side steps to HOB with walker and mod assist  .     Exercise:  Patient was guided in 1- set(s) 10 reps of exercises to both lower extremities: ,Ankle pumps, Quad sets, Heelslides, Short arc quads, Seated marches, Seated hamstring curls, Seated heel/toe raises, Long arc quads, and with extra effort to complete  .  Exercises were 
tartrate  25 mg Oral Daily    [START ON 12/16/2024] amiodarone  200 mg Oral BID    doxycycline (VIBRAMYCIN) IV  100 mg IntraVENous Q12H    cefTRIAXone (ROCEPHIN) IV  1,000 mg IntraVENous Q24H    famotidine  20 mg Oral Daily    sodium chloride flush  5-40 mL IntraVENous 2 times per day    polyethylene glycol  17 g Oral Daily    lidocaine  2 patch Topical Daily    methocarbamol  1,000 mg Oral 4x Daily    [Held by provider] spironolactone  25 mg Oral Daily    [Held by provider] valsartan  40 mg Oral Daily    [Held by provider] levothyroxine  50 mcg Oral Daily    [Held by provider] bumetanide  1 mg IntraVENous Daily      heparin (PORCINE) Infusion 15 Units/kg/hr (12/15/24 1202)    amiodarone 0.999 mg/min (12/15/24 1116)    dextrose      sodium chloride       heparin (porcine), 4,000 Units, PRN  heparin (porcine), 2,000 Units, PRN  ipratropium 0.5 mg-albuterol 2.5 mg, 1 Dose, Q4H PRN  glucose, 4 tablet, PRN  dextrose bolus, 125 mL, PRN   Or  dextrose bolus, 250 mL, PRN  glucagon (rDNA), 1 mg, PRN  dextrose, , Continuous PRN  sodium chloride flush, 5-40 mL, PRN  sodium chloride, , PRN  potassium chloride, 20 mEq, PRN   Or  potassium chloride, 10 mEq, PRN  magnesium sulfate, 2,000 mg, PRN  ondansetron, 4 mg, Q8H PRN   Or  ondansetron, 4 mg, Q6H PRN  acetaminophen, 650 mg, Q4H PRN  morphine, 2 mg, Q2H PRN   Or  morphine, 4 mg, Q2H PRN  HYDROcodone 5 mg - acetaminophen, 1 tablet, Q4H PRN   Or  HYDROcodone 5 mg - acetaminophen, 2 tablet, Q4H PRN        Diagnostics:  EKG:   Encounter Date: 12/09/24   EKG 12 Lead   Result Value    Ventricular Rate 114    QRS Duration 106    Q-T Interval 358    QTc Calculation (Bazett) 493    R Axis -63    T Axis 56    Narrative    Atrial fibrillation with rapid ventricular response  Left axis deviation  Pulmonary disease pattern  Incomplete right bundle branch block  Septal infarct , age undetermined  Abnormal ECG  When compared with ECG of 12-DEC-2024 08:22,  Significant changes have 
1149   Number of days: 8       Wound 12/22/24 Heel Right open area (Active)   Wound Image   12/23/24 1149   Wound Etiology Venous 12/23/24 1149   Dressing Status Old drainage noted;New dressing applied 12/23/24 1149   Wound Cleansed Cleansed with saline 12/23/24 1149   Dressing/Treatment Silicone border;Foam 12/23/24 1149   Offloading for Diabetic Foot Ulcers Offloading ordered 12/23/24 1149   Dressing Change Due 12/25/24 12/23/24 1149   Wound Assessment Pink/red 12/23/24 1149   Drainage Amount Scant (moist but unmeasurable) 12/23/24 1149   Drainage Description Serosanguinous 12/23/24 1149   Odor None 12/23/24 1149   Anna Marie-wound Assessment Blanchable erythema;Maceration 12/23/24 1149   Margins Attached edges 12/23/24 1149   Wound Thickness Description not for Pressure Injury Full thickness 12/23/24 1149   Number of days: 1          Plan     Treatment Recommendations:   Right left leg- Clean wounds with normal saline or wound cleanser and gauze. Pat dry with clean gauze. Apply bordered foam dressing. Change every 2-3 days and as needed.    Coccyx- Cleanse wound with normal saline or wound cleanser and gauze. Pat dry with clean gauze. Apply Triad barrier cream to wounds twice daily and PRN. If cream becomes soiled, wipe off top layer and reapply.       Pressure Injury Prevention:   [x] Support Surface: [place]  [] Turned with wedges   [x] Turned with pillows  [] Offloading boots   [] No depends  [] Chux pad  [] External urinary device  [] Other:    Discharge Plan:  Placement for patient upon discharge:   [] Home (self or family)  [] Home with home health  [] Inpatient Rehab  [x] SNF/ECF  [] [place]  [facility]: follow up with facility provider      
12/14.   Resumed Synthroid 50 mcg daily   Patient will need outpatient follow-up and labs with PCP in 4 to 6 weeks   CODE STATUS: I had a conversation with Henry Matos with palliative care today; Ms. Matos informed me that she spoke to patient's son Silas in the morning who was busy at work. Mrs. Matos spoke with patient's daughter Kristel.  Kristel stated she had a conversation with Silas and there other sibling who is a nurse about patient's CODE STATUS last night; family was in agreement that patient's CODE STATUS should be changed to DNR-CCA.     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 He likely has some cognitive deficit at baseline as his son reports he gets confused when he misses sleep at home.  Suspected pneumonia may also be contributing.  Patient's cognition today has significantly improved from previous days.  He was alert to person, events, time, and year, but still refused to believe that he was in the hospital.  He is much more alert and is able to tolerate oral intake.  We will likely be able to resume oral medications tomorrow.   Acute hypoxic respiratory [id number]: Patient presented with increased work of breathing in the setting of rib fractures with possible underlying pneumonia.  He has been maintaining adequate SpO2 on oxygenation.  On room air at times.  Does have some document desaturations at night that may be related to underlying sleep apnea.  He is currently on oxygen for comfort.   Hyperkalemia-resolved: Likely secondary to valsartan and spironolactone use in the absence of Bumex.    Elevated troponin: HST 55, repeat 48. EKG was showing A-fib, without significant ST elevation or depression. Patient denies any cardiac chest pain. Suspect demand ischemia in setting of heart failure 
  HAGMA-resolved: Likely secondary to starvation ketosis   Acute kidney injury-resolved   Suspected pneumonia-resolved: Findings on chest CT suggestive of pneumonia. No significant leukocytosis. There is no obvious cough though patient has had some wheezing on exam. Patient is also had delirium or infection may be contributing etiology. WBC 9.8. Currently afebrile. Currently treating due to patient's significant comorbidities and high risk for deterioration if under pneumonia worsens. Course of Rocephin and doxycycline completed.  Course of Rocephin completed  Continue Doxycycline 100 mg IV every 12 hours (12/13 -12/17)   Trend CBC, vitals  Monitor for S/S of worsening infection    Chronic Conditions (reviewed and stable unless otherwise stated)  HTN: Continue Lopressor, Aldactone. Hold valsartan.       LDA: []CVC / []PICC / []Midline / []Mota / []Drains / []Mediport / [x]None  Antibiotics: N/A  Steroids: N/A  Labs (still needed?): [x]Yes / []No  IVF (still needed?): []Yes / [x]No    Level of care: [x]Step Down / []Med-Surg  Bed Status: [x]Inpatient / []Observation  Telemetry: [x]Yes / []No  PT/OT: [x]Yes / []No    DVT Prophylaxis: [] Lovenox / [x] Heparin / [] SCDs / [] Already on Systemic Anticoagulation / [] None     Expected discharge date:  N/A  Disposition: Pending psychiatry recommendations for patient's mentation overnight. Pending SNF placement; patient has to be sitter free for 24 hours prior      Code status: DNR-CCA     ===================================================================    Chief Complaint: Fall   Subjective (past 24 hours): Patient seen and evaluated at bedside. A&Ox4. Vitally stable saturating at 93% on 2 L NC. Per nursing patient reported history send agitated overnight. Patient able to say his name and endorse that he is at [facility]. Patient has had no sitter for the past 2 days. Mota placed last night after 3 straight catheterizations. Per nursing patient currently not 
12.0 12/17/2024 07:38 AM    RBC 3.97 12/17/2024 07:38 AM    HGB 11.0 12/17/2024 07:38 AM    HCT 36.6 12/17/2024 07:38 AM     12/17/2024 07:38 AM       CMP:    Lab Results   Component Value Date/Time     12/17/2024 07:38 AM    K 4.2 12/17/2024 07:38 AM    K 5.4 12/12/2024 03:51 AM     12/17/2024 07:38 AM    CO2 22 12/17/2024 07:38 AM    BUN 25 12/17/2024 07:38 AM    CREATININE 1.1 12/17/2024 07:38 AM    LABGLOM 66 12/17/2024 07:38 AM    LABGLOM >60 02/12/2024 11:58 AM    LABGLOM 45 07/27/2023 12:00 AM    GLUCOSE 107 12/17/2024 07:38 AM    CALCIUM 8.7 12/17/2024 07:38 AM       Hepatic Function Panel:    Lab Results   Component Value Date/Time    ALKPHOS 217 12/17/2024 07:38 AM    ALT 28 12/17/2024 07:38 AM    AST 27 12/17/2024 07:38 AM    BILITOT 1.2 12/17/2024 07:38 AM    BILIDIR 0.8 12/17/2024 07:38 AM       Magnesium:    Lab Results   Component Value Date/Time    MG 2.1 12/09/2024 08:00 PM       PT/INR:    Lab Results   Component Value Date/Time    INR 1.40 12/14/2024 01:46 AM       HgBA1c:  No results found for: \"LABA1C\"    FLP:    Lab Results   Component Value Date/Time    TRIG 47 05/17/2023 08:19 AM    HDL 54 05/17/2023 08:19 AM       TSH:    Lab Results   Component Value Date/Time    TSH 8.690 12/17/2024 07:38 AM         Assessment:    Sp fall down steps   - trauma following     Rib fractures w/ RT pulmonary contusion   RT apical pneumothorax     Worsening confusion - improved   Likely Acute delirium on dementia      PAFB RVR -- persistent  AFB HR  today    - [id number] 4  - on IV heparin     Hypothyroid     Acute on chronic systolic chf - diuresis restarted today   Pleural effusion   R>L: sp RT thoracentesis 12/11/24    NICDMP EF 35-40%   EF 50% 5/2023  Severe RA / RV dilation     Patent coronaries 5/2023    NC with GDMT medications     DNR CCA       Plan:  Agree with diuresis - hope this brings down HR   GDMT limited DT marginal BP   Follow HR        CHF guidelines:  BB: yes  ACE / 
STATUS: I had a conversation with Henry Matos with palliative care today; Ms. Matos informed me that she spoke to patient's son Silas in the morning who was busy at work. Mrs. Matos spoke with patient's daughter Kristel.  Kristel stated she had a conversation with Silas and there other sibling who is a nurse about patient's CODE STATUS last night; family was in agreement that patient's CODE STATUS should be changed to DNR-CCA.     Resolved Conditions:  Hyperkalemia: Likely secondary to valsartan and spironolactone use.  Potassium 5.3; recheck 6. Latest EKG shows sinus bradycardia. Valsartan and spironolactone held. Patient given 1 dose of Lokelma.  Received 1 dose of Lokelma 10 Mg  Pending repeat potassium check at 1800  Continue holding Aldactone 25 Mg p.o. daily  Continue holding valsartan 40 Mg p.o. daily  Monitor telemetry  Lokelma 10 Mg doses as needed in setting of persistent hyperkalemia  Elevated troponin: HST 55, repeat 48. EKG was showing A-fib, without significant ST elevation or depression. Patient denies any cardiac chest pain. Suspect demand ischemia in setting of heart failure exacerbation and A-fib with RVR. Patient currently in sinus bradycardia.   Monitor on telemetry  Monitor for S/S of worsening ACS    Chronic Conditions (reviewed and stable unless otherwise stated)  HTN: Currently holding Lopressor, spironolactone and valsartan in setting of hyperkalemia and bradycardia      LDA: []CVC / []PICC / []Midline / []Mota / []Drains / []Mediport / [x]None  Antibiotics: Rocephin and doxycycline  Steroids: N/A  Labs (still needed?): [x]Yes / []No  IVF (still needed?): []Yes / [x]No    Level of care: [x]Step Down / []Med-Surg  Bed Status: [x]Inpatient / []Observation  Telemetry: [x]Yes / []No  PT/OT: [x]Yes / []No    DVT Prophylaxis: [] Lovenox / [] Heparin / [] SCDs / [x] Already on Systemic Anticoagulation / [] None     Expected discharge date: N/A  Disposition: Patient CODE STATUS changed to 
Yes    Patient Education:      .    Patient Education  Education Given To: Patient  Education Provided: Mobility Training  Education Method: Verbal, Demonstration  Barriers to Learning: Cognition  Education Outcome: Continued education needed       Plan:  Current Treatment Recommendations: Strengthening, Balance training, Functional mobility training, Transfer training, Endurance training, Gait training, Home exercise program, Therapeutic activities, Patient/Caregiver education & training, Neuromuscular re-education  General Plan:  (4-5x GM)    Goals:  Patient Goals : None stated  Short Term Goals  Time Frame for Short Term Goals: By discharge  Short Term Goal 1: Patient to demonstrate (I) bed mobility w/o rails.  Short Term Goal 2: Patient to transfer sit to stand (I)ly.  Short Term Goal 3: Patient to sit unsupported with good trunk righting for duration of 15 minutes.  Short Term Goal 4: Patient to stand with weight shifting activity with UE support on walker to prepare for stand pivot transfers and gait.  Short Term Goal 5: Transfers/Gait to be assessed when appropriate.  Long Term Goals  Time Frame for Long Term Goals : N/A due to short ELOS    Following session, patient left in safe position with all fall risk precautions in place.         
[]No  PT/OT: [x]Yes / []No    DVT Prophylaxis: [] Lovenox / [x] Heparin / [] SCDs / [] Already on Systemic Anticoagulation / [] None     Expected discharge date:  N/A  Disposition: Patient rejected from [facility] given current mentation; must be sitter and restraint free for 24 hours prior to discharge to SNF  Code status: DNR-CCA       Chief Complaint: Fall        HPI / Hospital Course:  Tato Sowleif 84 y.o. male who we are asked to see/evaluate by Ashley Escobar MD for medical management of CHF exacerbation and Afib with RVR. Patient very hard of hearing. Patient reports he had a fall last night. Per ER documentation family states the patient fell down 3 porch stairs. Patient does tell me he has had increased swelling in his legs that has caused difficulty with ambulation. He follows with Dr. Romero in the heart failure clinic. States that he was admitted in the summer for heart failure exacerbation and had been doing well up until the end of October. States since October he has had increased lower extremity edema. Has had weeping edema. In the ER was found to have rib fractures and admitted to trauma service. Hospitalist consulted for medical management of chronic conditions as well as CHF exacerbation and A-fib with RVR. Upon exam, patient resting in bed. Denies any cardiac chest pain or shortness of breath. Does endorse right lateral chest wall/rib pain. States this is where he fell.     Medications:    Infusion Medications    heparin (PORCINE) Infusion 16 Units/kg/hr (12/25/24 0655)    dextrose      sodium chloride      Scheduled Medications    QUEtiapine  25 mg Oral QAM    rivastigmine  1 patch TransDERmal Daily    bumetanide  1 mg IntraVENous BID    [Held by provider] bumetanide  1 mg Oral Daily    [Held by provider] apixaban  5 mg Oral BID    [Held by provider] metoprolol tartrate  100 mg Oral BID    tamsulosin  0.4 mg Oral Daily    [Held by provider] valsartan  20 mg Oral Daily    thiamine  100 
beta-blocker    12.27.2024 patient seen this a.m. alert and oriented x 3 states he would like to start working with physical therapy.  Currently A-fib is controlled with beta-blocker.  Heparin has been DC'd and oral anticoagulation started.  Patient should be ready for discharge in 24 hours.  Pre-CERT will be needed    LDA: []CVC / []PICC / []Midline / []Mota / []Drains / []Mediport / [x]None  Antibiotics: N/A  Steroids: N/A  Labs (still needed?): [x]Yes / []No  IVF (still needed?): []Yes / [x]No    Level of care: []Step Down / [x]Med-Surg  Bed Status: []Inpatient / [x]Observation  Telemetry: [x]Yes / []No  PT/OT: [x]Yes / []No    DVT Prophylaxis: [] Lovenox / [x] Heparin / [] SCDs / [] Already on Systemic Anticoagulation / [] None     Expected discharge date:  N/A  Disposition: Patient rejected from [facility] given current mentation; must be sitter and restraint free for 24 hours prior to discharge to SNF  Code status: DNR-CCA       Chief Complaint: Fall        HPI / Hospital Course:  Tato Cannon 84 y.o. male who we are asked to see/evaluate by Ashley Escobar MD for medical management of CHF exacerbation and Afib with RVR. Patient very hard of hearing. Patient reports he had a fall last night. Per ER documentation family states the patient fell down 3 porch stairs. Patient does tell me he has had increased swelling in his legs that has caused difficulty with ambulation. He follows with Dr. Romero in the heart failure clinic. States that he was admitted in the summer for heart failure exacerbation and had been doing well up until the end of October. States since October he has had increased lower extremity edema. Has had weeping edema. In the ER was found to have rib fractures and admitted to trauma service. Hospitalist consulted for medical management of chronic conditions as well as CHF exacerbation and A-fib with RVR. Upon exam, patient resting in bed. Denies any cardiac chest pain or shortness of 
needs  Veroquvo candidate: no      Electronically signed by ANASTASIA Cameron CNP on 12/13/2024 at 11:52 AM            
In the ER was found to have rib fractures and admitted to trauma service. Hospitalist consulted for medical management of chronic conditions as well as CHF exacerbation and A-fib with RVR. Upon exam, patient resting in bed. Denies any cardiac chest pain or shortness of breath. Does endorse right lateral chest wall/rib pain. States this is where he fell.     Medications:    Infusion Medications    dextrose      sodium chloride      Scheduled Medications    metoprolol tartrate  50 mg Oral BID    QUEtiapine  25 mg Oral QAM    rivastigmine  1 patch TransDERmal Daily    bumetanide  1 mg IntraVENous BID    [Held by provider] bumetanide  1 mg Oral Daily    apixaban  5 mg Oral BID    tamsulosin  0.4 mg Oral Daily    [Held by provider] valsartan  20 mg Oral Daily    thiamine  100 mg Oral Daily    spironolactone  25 mg Oral Daily    digoxin  62.5 mcg Oral Daily    amiodarone  200 mg Oral Daily    famotidine  20 mg Oral Daily    sodium chloride flush  5-40 mL IntraVENous 2 times per day    polyethylene glycol  17 g Oral Daily    lidocaine  2 patch Topical Daily    methocarbamol  1,000 mg Oral 4x Daily    levothyroxine  50 mcg Oral Daily    PRN Meds: OLANZapine (ZyPREXA) 5 mg in sterile water 1 mL injection, metoprolol, ipratropium 0.5 mg-albuterol 2.5 mg, glucose, dextrose bolus **OR** dextrose bolus, glucagon (rDNA), dextrose, sodium chloride flush, sodium chloride, potassium chloride **OR** potassium chloride, ondansetron **OR** ondansetron, acetaminophen    Exam:  BP (!) 112/58   Pulse 63   Temp 98.1 °F (36.7 °C) (Axillary)   Resp 18   Ht 1.753 m (5' 9\")   Wt 84.4 kg (186 lb 1.1 oz)   SpO2 90%   BMI 27.48 kg/m²   General: No distress, appears stated age.  Eyes:  PERRL. Conjunctivae/corneas clear.  HENT: Head normal appearing. Nares normal. Oral mucosa moist.  Hearing intact.   Neck: Supple, with full range of motion. Trachea midline.  No gross JVD appreciated.  Respiratory: Clear to auscultation.   Cardiovascular: 
fell down 3 porch stairs. Patient does tell me he has had increased swelling in his legs that has caused difficulty with ambulation. He follows with Dr. Romero in the heart failure clinic. States that he was admitted in the summer for heart failure exacerbation and had been doing well up until the end of October. States since October he has had increased lower extremity edema. Has had weeping edema. In the ER was found to have rib fractures and admitted to trauma service. Hospitalist consulted for medical management of chronic conditions as well as CHF exacerbation and A-fib with RVR. Upon exam, patient resting in bed. Denies any cardiac chest pain or shortness of breath. Does endorse right lateral chest wall/rib pain. States this is where he fell.     Medications:    Infusion Medications    dextrose      sodium chloride      Scheduled Medications    metoprolol tartrate  50 mg Oral BID    QUEtiapine  25 mg Oral QAM    rivastigmine  1 patch TransDERmal Daily    [Held by provider] bumetanide  1 mg IntraVENous BID    [Held by provider] bumetanide  1 mg Oral Daily    apixaban  5 mg Oral BID    tamsulosin  0.4 mg Oral Daily    [Held by provider] valsartan  20 mg Oral Daily    thiamine  100 mg Oral Daily    spironolactone  25 mg Oral Daily    digoxin  62.5 mcg Oral Daily    amiodarone  200 mg Oral Daily    famotidine  20 mg Oral Daily    sodium chloride flush  5-40 mL IntraVENous 2 times per day    polyethylene glycol  17 g Oral Daily    lidocaine  2 patch Topical Daily    methocarbamol  1,000 mg Oral 4x Daily    levothyroxine  50 mcg Oral Daily    PRN Meds: OLANZapine (ZyPREXA) 5 mg in sterile water 1 mL injection, metoprolol, ipratropium 0.5 mg-albuterol 2.5 mg, glucose, dextrose bolus **OR** dextrose bolus, glucagon (rDNA), dextrose, sodium chloride flush, sodium chloride, potassium chloride **OR** potassium chloride, ondansetron **OR** ondansetron, acetaminophen    Exam:  /72   Pulse 57   Temp 97.7 °F (36.5 °C) 
polyethylene glycol  17 g Oral Daily    lidocaine  2 patch Topical Daily    methocarbamol  1,000 mg Oral 4x Daily    levothyroxine  50 mcg Oral Daily    PRN Meds: haloperidol lactate, heparin (porcine), heparin (porcine), ipratropium 0.5 mg-albuterol 2.5 mg, glucose, dextrose bolus **OR** dextrose bolus, glucagon (rDNA), dextrose, sodium chloride flush, sodium chloride, potassium chloride **OR** potassium chloride, magnesium sulfate, ondansetron **OR** ondansetron, acetaminophen    Exam:  /86   Pulse (!) 103   Temp 97.4 °F (36.3 °C) (Axillary)   Resp 18   Ht 1.753 m (5' 9\")   Wt 95.6 kg (210 lb 12.2 oz)   SpO2 93%   BMI 31.12 kg/m²   General: No distress, appears stated age. Currently on RA.   Eyes:  PERRL. Conjunctivae/corneas clear.  HENT: Head normal appearing. Nares normal. Oral mucosa moist.  Hearing intact.   Neck: Supple, with full range of motion. Trachea midline.  No gross JVD appreciated.  Respiratory: Normal work of breathing; clear to auscultation bilaterally.  No wheezes, rales or rhonchi.   Cardiovascular: Normal rate, regular rhythm with normal S1/S2 without murmurs.    No lower extremity edema.   Abdomen: Soft, non-tender, non-distended with normal bowel sounds.  Musculoskeletal: No joint swelling or tenderness. Normal tone. No abnormal movements.   Skin: Warm and dry. No rashes or lesions. Upper and lower extremities warm and dry.  Neurologic:  No focal sensory/motor deficits in the upper or lower extremities. Cranial nerves:  grossly non-focal 2-12.     Psychiatric: Tangential thinking. Currently A&Ox4.   Capillary Refill: Brisk,< 3 seconds.  Peripheral Pulses: +2 palpable, equal bilaterally.       Labs/Radiology: See chart or assessment above.     Electronically signed by Angelika Mata DO on 12/19/2024 at 2:24 PM  Case was discussed with Attending, Dr. Morales.    
lethargic during examination.  Capillary Refill: Brisk,< 3 seconds.  Peripheral Pulses: +2 palpable, equal bilaterally.       Labs/Radiology: See chart or assessment above.     Electronically signed by Dinesh Cortez DO on 12/26/2024 at 8:17 AM

## 2025-01-02 ENCOUNTER — CLINICAL DOCUMENTATION (OUTPATIENT)
Dept: PALLATIVE CARE | Age: 85
End: 2025-01-02

## 2025-01-03 NOTE — ACP (ADVANCE CARE PLANNING)
Advance Care Planning       Advanced Steps Advance Care Planning Conversation  (Goals of Care for Serious Illness and/or Frailty)        Date of conversation: 01/02/25  [place]  Length (minutes): 35    Advanced Steps® ACP Facilitator: Casie Guillen, RN, BSN    Participants:    [x] Patient only. Pt was able to respond to questions below. At times, his hearing loss required repeating. Notes also reviewed from the palliative care visits in hospital.                                                           Healthcare Decision Maker:    Primary Decision Maker: Kristel Thomson - Child - [phone]    Primary Decision Maker: Silas Cannon - Child - [phone]     Pt does not have a HCPOA on file, but verifies he has 2 children as above who serve jointly as legal next of kin.    Conversation Topics    Understanding of Medical Condition/s AND Potential Complications:    Patient response: Pt described his fall that led to fractured ribs that punctured a lung. He spoke of his issues with his legs swelling and with infection earlier in the year (cellulitis). Pt did not mention the term CHF but asked if I had ever seen seeping legs and I stated I had with CHF in the past. He mentioned working with cardiology.  Healthcare Agent/Other Surrogate:  Per notes, palliative care spoke with family this past admit to review conditions.    Patient's hospital experiences that may influence future decision making:  He spoke of his 3 admits this past year but indicates he would still go back to the hospital if needed, as some of his issues were ones he didn't think could be handled at home.    Identifies the following as important for living well (quality of life):  States he would like to be involved in activities but had so many problems this past year, he is unsure if he has any hobbies, but wishes to remain independent.    Hopes:  To be able to walk and get around. Hopes to rehab to home.      Worries/Fears

## 2025-01-06 LAB
BUN / CREAT RATIO: 27 (ref 7–25)
BUN BLDV-MCNC: 27 MG/DL (ref 3–29)
CALCIUM SERPL-MCNC: 8.9 MG/DL (ref 8.5–10.5)
CHLORIDE BLD-SCNC: 103 MEQ/L (ref 96–110)
CO2: 23 MEQ/L (ref 19–32)
CREAT SERPL-MCNC: 1 MG/DL (ref 0.5–1.2)
ESTIMATED GLOMERULAR FILTRATION RATE CREATININE EQUATION: 75 MLS/MIN/1.73M2
FASTING STATUS: ABNORMAL
GLUCOSE BLD-MCNC: 59 MG/DL (ref 70–99)
POTASSIUM SERPL-SCNC: 5.4 MEQ/L (ref 3.4–5.3)
SODIUM BLD-SCNC: 136 MEQ/L (ref 135–148)
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
## 2025-01-09 LAB
A/G RATIO: 1.1 RATIO (ref 0.8–2.6)
ALBUMIN: 3.2 G/DL (ref 3.5–5.2)
ALP BLD-CCNC: 324 U/L (ref 23–144)
ALT SERPL-CCNC: 35 U/L (ref 0–60)
AST SERPL-CCNC: 26 U/L (ref 0–55)
BILIRUB SERPL-MCNC: 0.7 MG/DL (ref 0–1.2)
BUN / CREAT RATIO: 24 (ref 7–25)
BUN BLDV-MCNC: 24 MG/DL (ref 3–29)
CALCIUM SERPL-MCNC: 9.1 MG/DL (ref 8.5–10.5)
CHLORIDE BLD-SCNC: 103 MEQ/L (ref 96–110)
CO2: 25 MEQ/L (ref 19–32)
CREAT SERPL-MCNC: 1 MG/DL (ref 0.5–1.2)
DIGOXIN LEVEL: 0.9 NG/ML (ref 0.8–2)
ESTIMATED GLOMERULAR FILTRATION RATE CREATININE EQUATION: 75 MLS/MIN/1.73M2
FASTING STATUS: ABNORMAL
GLOBULIN: 3 G/DL (ref 1.9–3.6)
GLUCOSE BLD-MCNC: 77 MG/DL (ref 70–99)
HCT VFR BLD CALC: 36.2 % (ref 37.5–51)
HEMOGLOBIN: 11.4 G/DL (ref 13–17.7)
MCH RBC QN AUTO: 28.1 PG (ref 26–34)
MCHC RBC AUTO-ENTMCNC: 31.5 G/DL (ref 30.7–35.5)
MCV RBC AUTO: 89.4 FL (ref 80–100)
PDW BLD-RTO: 15.8 %
PLATELET # BLD: 281 K/UL (ref 140–400)
PMV BLD AUTO: 9.8 FL (ref 7.2–11.7)
POTASSIUM SERPL-SCNC: 5 MEQ/L (ref 3.4–5.3)
RBC # BLD: 4.05 M/UL (ref 4.14–5.8)
SODIUM BLD-SCNC: 137 MEQ/L (ref 135–148)
TOTAL PROTEIN: 6.2 G/DL (ref 6–8.3)
WBC # BLD: 8.1 K/UL (ref 3.5–10.9)

## 2025-01-29 NOTE — DISCHARGE SUMMARY
tablets by mouth daily     digoxin 125 MCG tablet  Commonly known as: LANOXIN  Take 1 tablet by mouth daily  Start taking on: February 9, 2024     levothyroxine 25 MCG tablet  Commonly known as: SYNTHROID  Take 1 tablet by mouth Daily  Start taking on: February 9, 2024     potassium chloride 10 MEQ extended release tablet  Commonly known as: KLOR-CON M  Take 2 tablets by mouth daily for 15 days     valsartan 40 MG tablet  Commonly known as: DIOVAN  Take 0.5 tablets by mouth in the morning and at bedtime            CHANGE how you take these medications      metoprolol succinate 100 MG extended release tablet  Commonly known as: TOPROL XL  Take 1 tablet by mouth in the morning and at bedtime  What changed:   medication strength  how much to take  when to take this            CONTINUE taking these medications      acetaminophen 325 MG tablet  Commonly known as: TYLENOL     apixaban 5 MG Tabs tablet  Commonly known as: ELIQUIS  Take 1 tablet by mouth 2 times daily     atorvastatin 40 MG tablet  Commonly known as: LIPITOR     tamsulosin 0.4 MG capsule  Commonly known as: FLOMAX               Where to Get Your Medications        These medications were sent to [facility] - [place] [id number] [address] -  [phone] - F [phone]  [address] [address]      Phone: [phone]   apixaban 5 MG Tabs tablet  bumetanide 1 MG tablet  digoxin 125 MCG tablet  levothyroxine 25 MCG tablet  metoprolol succinate 100 MG extended release tablet  potassium chloride 10 MEQ extended release tablet  valsartan 40 MG tablet          Time Spent on discharge is more than 45 minutes in the examination, evaluation, counseling and review of medications and discharge plan.      Signed:    Electronically signed by Janet Fong MD on 2/8/2024 at 1:57 PM        
DISCHARGE

## 2025-03-20 ENCOUNTER — TELEPHONE (OUTPATIENT)
Dept: CARDIOLOGY CLINIC | Age: 85
End: 2025-03-20

## 2025-03-20 NOTE — TELEPHONE ENCOUNTER
Patient no showed to CHF clinic.  Called to patient, no answer. UTLM.  Called to dtr Kristel. LM to call office back

## 2025-04-24 ENCOUNTER — TELEPHONE (OUTPATIENT)
Dept: CARDIOLOGY CLINIC | Age: 85
End: 2025-04-24

## 2025-04-24 NOTE — TELEPHONE ENCOUNTER
Pt LM on  cancelled appt valeria today due to being sick. Attempted to call pt did not answer unable to LM.

## 2025-07-17 ENCOUNTER — TRANSCRIBE ORDERS (OUTPATIENT)
Dept: ADMINISTRATIVE | Age: 85
End: 2025-07-17

## 2025-07-17 ENCOUNTER — HOSPITAL ENCOUNTER (OUTPATIENT)
Age: 85
Discharge: HOME | End: 2025-07-17
Payer: MEDICARE

## 2025-07-17 ENCOUNTER — HOSPITAL ENCOUNTER (OUTPATIENT)
Dept: GENERAL RADIOLOGY | Age: 85
Discharge: HOME | End: 2025-07-17
Payer: MEDICARE

## 2025-07-17 DIAGNOSIS — I95.89 OTHER HYPOTENSION: Primary | ICD-10-CM

## 2025-07-17 DIAGNOSIS — R06.02 SHORTNESS OF BREATH ON EXERTION: ICD-10-CM

## 2025-07-17 DIAGNOSIS — I95.89 OTHER HYPOTENSION: ICD-10-CM

## 2025-07-17 LAB
ANION GAP SERPL CALC-SCNC: 11 MEQ/L (ref 8–16)
BACTERIA URNS QL MICRO: ABNORMAL /HPF
BASOPHILS ABSOLUTE: 0 THOU/MM3 (ref 0–0.1)
BASOPHILS NFR BLD AUTO: 0.6 %
BILIRUB UR QL STRIP.AUTO: NEGATIVE
BUN SERPL-MCNC: 38 MG/DL (ref 8–23)
CALCIUM SERPL-MCNC: 9.7 MG/DL (ref 8.8–10.2)
CASTS #/AREA URNS LPF: ABNORMAL /LPF
CASTS 2: ABNORMAL /LPF
CHARACTER UR: CLEAR
CHLORIDE SERPL-SCNC: 104 MEQ/L (ref 98–111)
CO2 SERPL-SCNC: 24 MEQ/L (ref 22–29)
COLOR, UA: YELLOW
CREAT SERPL-MCNC: 1.4 MG/DL (ref 0.7–1.2)
CRYSTALS URNS MICRO: ABNORMAL
DEPRECATED RDW RBC AUTO: 56.4 FL (ref 35–45)
EKG Q-T INTERVAL: 390 MS
EKG QRS DURATION: 100 MS
EKG QTC CALCULATION (BAZETT): 447 MS
EKG R AXIS: -54 DEGREES
EKG T AXIS: -3 DEGREES
EKG VENTRICULAR RATE: 79 BPM
EOSINOPHIL NFR BLD AUTO: 2.3 %
EOSINOPHILS ABSOLUTE: 0.1 THOU/MM3 (ref 0–0.4)
EPITHELIAL CELLS, UA: ABNORMAL /HPF
ERYTHROCYTE [DISTWIDTH] IN BLOOD BY AUTOMATED COUNT: 16.1 % (ref 11.5–14.5)
GFR SERPL CREATININE-BSD FRML MDRD: 49 ML/MIN/1.73M2
GLUCOSE SERPL-MCNC: 112 MG/DL (ref 74–109)
GLUCOSE UR QL STRIP.AUTO: NEGATIVE MG/DL
HCT VFR BLD AUTO: 40.4 % (ref 42–52)
HGB BLD-MCNC: 12.2 GM/DL (ref 14–18)
HGB UR QL STRIP.AUTO: NEGATIVE
IMM GRANULOCYTES # BLD AUTO: 0.01 THOU/MM3 (ref 0–0.07)
IMM GRANULOCYTES NFR BLD AUTO: 0.2 %
KETONES UR QL STRIP.AUTO: ABNORMAL
LYMPHOCYTES ABSOLUTE: 1.4 THOU/MM3 (ref 1–4.8)
LYMPHOCYTES NFR BLD AUTO: 23.2 %
MCH RBC QN AUTO: 28.7 PG (ref 26–33)
MCHC RBC AUTO-ENTMCNC: 30.2 GM/DL (ref 32.2–35.5)
MCV RBC AUTO: 95.1 FL (ref 80–94)
MISCELLANEOUS 2: ABNORMAL
MONOCYTES ABSOLUTE: 0.6 THOU/MM3 (ref 0.4–1.3)
MONOCYTES NFR BLD AUTO: 9.9 %
NEUTROPHILS ABSOLUTE: 4 THOU/MM3 (ref 1.8–7.7)
NEUTROPHILS NFR BLD AUTO: 63.8 %
NITRITE UR QL STRIP: NEGATIVE
NRBC BLD AUTO-RTO: 0 /100 WBC
NT-PROBNP SERPL IA-MCNC: 3841 PG/ML (ref 0–449)
PH UR STRIP.AUTO: 5.5 [PH] (ref 5–9)
PLATELET # BLD AUTO: 284 THOU/MM3 (ref 130–400)
PMV BLD AUTO: 9.8 FL (ref 9.4–12.4)
POTASSIUM SERPL-SCNC: 5 MEQ/L (ref 3.5–5.2)
PROT UR STRIP.AUTO-MCNC: ABNORMAL MG/DL
RBC # BLD AUTO: 4.25 MILL/MM3 (ref 4.7–6.1)
RBC URINE: ABNORMAL /HPF
RENAL EPI CELLS #/AREA URNS HPF: ABNORMAL /[HPF]
SODIUM SERPL-SCNC: 139 MEQ/L (ref 135–145)
SP GR UR REFRACT.AUTO: 1.02 (ref 1–1.03)
T4 FREE SERPL-MCNC: 0.8 NG/DL (ref 0.92–1.68)
TSH SERPL DL<=0.05 MIU/L-ACNC: 7.18 UIU/ML (ref 0.27–4.2)
UROBILINOGEN, URINE: 1 EU/DL (ref 0–1)
WBC # BLD AUTO: 6.2 THOU/MM3 (ref 4.8–10.8)
WBC #/AREA URNS HPF: ABNORMAL /HPF
WBC #/AREA URNS HPF: NEGATIVE /[HPF]
YEAST LIKE FUNGI URNS QL MICRO: ABNORMAL

## 2025-07-17 PROCEDURE — 80048 BASIC METABOLIC PNL TOTAL CA: CPT

## 2025-07-17 PROCEDURE — 85025 COMPLETE CBC W/AUTO DIFF WBC: CPT

## 2025-07-17 PROCEDURE — 93010 ELECTROCARDIOGRAM REPORT: CPT | Performed by: NUCLEAR MEDICINE

## 2025-07-17 PROCEDURE — 93005 ELECTROCARDIOGRAM TRACING: CPT | Performed by: NURSE PRACTITIONER

## 2025-07-17 PROCEDURE — 36415 COLL VENOUS BLD VENIPUNCTURE: CPT

## 2025-07-17 PROCEDURE — 83880 ASSAY OF NATRIURETIC PEPTIDE: CPT

## 2025-07-17 PROCEDURE — 84439 ASSAY OF FREE THYROXINE: CPT

## 2025-07-17 PROCEDURE — 71045 X-RAY EXAM CHEST 1 VIEW: CPT

## 2025-07-17 PROCEDURE — 81001 URINALYSIS AUTO W/SCOPE: CPT

## 2025-07-17 PROCEDURE — 84443 ASSAY THYROID STIM HORMONE: CPT

## 2025-08-27 ENCOUNTER — HOSPITAL ENCOUNTER (OUTPATIENT)
Dept: INTERVENTIONAL RADIOLOGY/VASCULAR | Age: 85
Discharge: HOME | End: 2025-08-29
Payer: MEDICARE

## 2025-08-27 ENCOUNTER — OFFICE VISIT (OUTPATIENT)
Dept: CARDIOLOGY CLINIC | Age: 85
End: 2025-08-27
Payer: MEDICARE

## 2025-08-27 VITALS
HEART RATE: 97 BPM | HEIGHT: 69 IN | BODY MASS INDEX: 24.41 KG/M2 | SYSTOLIC BLOOD PRESSURE: 133 MMHG | WEIGHT: 164.8 LBS | DIASTOLIC BLOOD PRESSURE: 83 MMHG

## 2025-08-27 DIAGNOSIS — R20.0 RIGHT ARM NUMBNESS: ICD-10-CM

## 2025-08-27 DIAGNOSIS — M79.601 RIGHT ARM PAIN: ICD-10-CM

## 2025-08-27 DIAGNOSIS — I50.42 CHRONIC COMBINED SYSTOLIC AND DIASTOLIC CHF (CONGESTIVE HEART FAILURE) (HCC): Primary | ICD-10-CM

## 2025-08-27 DIAGNOSIS — I48.0 PAROXYSMAL ATRIAL FIBRILLATION (HCC): ICD-10-CM

## 2025-08-27 LAB — ECHO BSA: 1.91 M2

## 2025-08-27 PROCEDURE — 93971 EXTREMITY STUDY: CPT

## 2025-08-27 PROCEDURE — 1124F ACP DISCUSS-NO DSCNMKR DOCD: CPT | Performed by: STUDENT IN AN ORGANIZED HEALTH CARE EDUCATION/TRAINING PROGRAM

## 2025-08-27 PROCEDURE — 1159F MED LIST DOCD IN RCRD: CPT | Performed by: STUDENT IN AN ORGANIZED HEALTH CARE EDUCATION/TRAINING PROGRAM

## 2025-08-27 PROCEDURE — 99214 OFFICE O/P EST MOD 30 MIN: CPT | Performed by: STUDENT IN AN ORGANIZED HEALTH CARE EDUCATION/TRAINING PROGRAM
